# Patient Record
Sex: FEMALE | Race: WHITE | NOT HISPANIC OR LATINO | Employment: OTHER | URBAN - METROPOLITAN AREA
[De-identification: names, ages, dates, MRNs, and addresses within clinical notes are randomized per-mention and may not be internally consistent; named-entity substitution may affect disease eponyms.]

---

## 2023-04-20 LAB
ANTICARDIOLIPIN IGA ANTIBODY: 2.8 APL U/ML (ref 0–20)
ANTICARDIOLIPIN IGG ANTIBODY: <1.6 GPL U/ML (ref 0–20)
ANTICARDIOLIPIN IGM ANTIBODY: 10.4 MPL U/ML (ref 0–20)
BASOPHILS (10*3/UL) IN BLOOD BY AUTOMATED COUNT: 0.08 X10E9/L (ref 0–0.1)
BASOPHILS/100 LEUKOCYTES IN BLOOD BY AUTOMATED COUNT: 1.2 % (ref 0–2)
BETA 2 GLYCOPROTEIN 1 IGA AB IN SERUM: 2.9 U/ML (ref 0–20)
BETA 2 GLYCOPROTEIN 1 IGG AB IN SERUM: 1.6 U/ML (ref 0–20)
BETA 2 GLYCOPROTEIN 1 IGM ANTIBODY IN SERUM: 14.3 U/ML (ref 0–20)
DILUTE RUSSELL VIPER VENOM TIME CONF: 1.51 RATIO
DILUTE RUSSELL VIPER VENOM TIME SCREEN: 1.38 RATIO
DILUTE RUSSELL VIPER VENOM TIME TEST RATIO: 0.91 RATIO
EOSINOPHILS (10*3/UL) IN BLOOD BY AUTOMATED COUNT: 0.32 X10E9/L (ref 0–0.4)
EOSINOPHILS/100 LEUKOCYTES IN BLOOD BY AUTOMATED COUNT: 4.7 % (ref 0–6)
ERYTHROCYTE DISTRIBUTION WIDTH (RATIO) BY AUTOMATED COUNT: 13.1 % (ref 11.5–14.5)
ERYTHROCYTE MEAN CORPUSCULAR HEMOGLOBIN CONCENTRATION (G/DL) BY AUTOMATED: 32.4 G/DL (ref 32–36)
ERYTHROCYTE MEAN CORPUSCULAR VOLUME (FL) BY AUTOMATED COUNT: 102 FL (ref 80–100)
ERYTHROCYTES (10*6/UL) IN BLOOD BY AUTOMATED COUNT: 4.38 X10E12/L (ref 4–5.2)
HEMATOCRIT (%) IN BLOOD BY AUTOMATED COUNT: 44.7 % (ref 36–46)
HEMOGLOBIN (G/DL) IN BLOOD: 14.5 G/DL (ref 12–16)
IMMATURE GRANULOCYTES/100 LEUKOCYTES IN BLOOD BY AUTOMATED COUNT: 0.3 % (ref 0–0.9)
LEUKOCYTES (10*3/UL) IN BLOOD BY AUTOMATED COUNT: 6.8 X10E9/L (ref 4.4–11.3)
LYMPHOCYTES (10*3/UL) IN BLOOD BY AUTOMATED COUNT: 2.28 X10E9/L (ref 0.8–3)
LYMPHOCYTES/100 LEUKOCYTES IN BLOOD BY AUTOMATED COUNT: 33.6 % (ref 13–44)
MONOCYTES (10*3/UL) IN BLOOD BY AUTOMATED COUNT: 0.6 X10E9/L (ref 0.05–0.8)
MONOCYTES/100 LEUKOCYTES IN BLOOD BY AUTOMATED COUNT: 8.8 % (ref 2–10)
NEUTROPHILS (10*3/UL) IN BLOOD BY AUTOMATED COUNT: 3.48 X10E9/L (ref 1.6–5.5)
NEUTROPHILS/100 LEUKOCYTES IN BLOOD BY AUTOMATED COUNT: 51.4 % (ref 40–80)
NRBC (PER 100 WBCS) BY AUTOMATED COUNT: 0 /100 WBC (ref 0–0)
PLATELETS (10*3/UL) IN BLOOD AUTOMATED COUNT: 267 X10E9/L (ref 150–450)

## 2023-04-21 LAB — ANTI-NUCLEAR ANTIBODY (ANA): NEGATIVE

## 2023-04-25 LAB
ALBUMIN ELP: 3.8 G/DL (ref 3.4–5)
ALPHA 1: 0.2 G/DL (ref 0.2–0.6)
ALPHA 2: 1 G/DL (ref 0.4–1.1)
BETA: 0.9 G/DL (ref 0.5–1.2)
GAMMA GLOBULIN: 0.7 G/DL (ref 0.5–1.4)
PATH REVIEW-SERUM PROTEIN ELECTROPHORESIS: NORMAL
PROTEIN ELECTROPHORESIS INTERPRETATION: NORMAL
PROTEIN TOTAL: 6.7 G/DL (ref 6.4–8.2)

## 2023-04-26 LAB — CRYOFIBRINOGEN: NORMAL

## 2023-04-27 LAB
CRYOCRIT IMMUNODIFFUSION (SJC): NORMAL
CRYOCRIT IMMUNOFIXATION (SJC): NORMAL
CRYOGLOBULIN  (SJC): NEGATIVE
PERCENT CRYOCRIT (SJC): NORMAL %
RHEUMATOID FACTOR (SJC): NORMAL IU/ML

## 2023-08-07 ENCOUNTER — HOSPITAL ENCOUNTER (OUTPATIENT)
Dept: DATA CONVERSION | Facility: HOSPITAL | Age: 78
Discharge: HOME | End: 2023-08-07

## 2023-08-07 DIAGNOSIS — M54.50 LOW BACK PAIN, UNSPECIFIED: ICD-10-CM

## 2023-08-07 LAB
BASOPHILS # BLD AUTO: 0.06 K/UL (ref 0–0.22)
BASOPHILS NFR BLD AUTO: 0.8 % (ref 0–1)
DEPRECATED RDW RBC AUTO: 49.5 FL (ref 37–54)
DIFFERENTIAL METHOD BLD: ABNORMAL
EOSINOPHIL # BLD AUTO: 0.29 K/UL (ref 0–0.45)
EOSINOPHIL NFR BLD: 4 % (ref 0–3)
ERYTHROCYTE [DISTWIDTH] IN BLOOD BY AUTOMATED COUNT: 13.1 % (ref 11.7–15)
HCT VFR BLD AUTO: 43 % (ref 36–44)
HGB BLD-MCNC: 13.6 GM/DL (ref 12–15)
IMM GRANULOCYTES # BLD AUTO: 0.03 K/UL (ref 0–0.1)
LYMPHOCYTES # BLD AUTO: 2.35 K/UL (ref 1.2–3.2)
LYMPHOCYTES NFR BLD MANUAL: 32.3 % (ref 20–40)
MCH RBC QN AUTO: 32.3 PG (ref 26–34)
MCHC RBC AUTO-ENTMCNC: 31.6 % (ref 31–37)
MCV RBC AUTO: 102.1 FL (ref 80–100)
MONOCYTES # BLD AUTO: 0.74 K/UL (ref 0–0.8)
MONOCYTES NFR BLD MANUAL: 10.2 % (ref 0–8)
NEUTROPHILS # BLD AUTO: 3.81 K/UL
NEUTROPHILS # BLD AUTO: 3.81 K/UL (ref 1.8–7.7)
NEUTROPHILS.IMMATURE NFR BLD: 0.4 % (ref 0–1)
NEUTS SEG NFR BLD: 52.3 % (ref 50–70)
NRBC BLD-RTO: 0 /100 WBC
PLATELET # BLD AUTO: 232 K/UL (ref 150–450)
PMV BLD AUTO: 10.4 CU (ref 7–12.6)
RBC # BLD AUTO: 4.21 M/UL (ref 4–4.9)
WBC # BLD AUTO: 7.3 K/UL (ref 4.5–11)

## 2023-09-06 PROBLEM — R73.03 PREDIABETES: Status: ACTIVE | Noted: 2023-09-06

## 2023-09-06 PROBLEM — N81.6 RECTOCELE: Status: ACTIVE | Noted: 2023-09-06

## 2023-09-06 PROBLEM — I10 ESSENTIAL HYPERTENSION: Status: ACTIVE | Noted: 2023-09-06

## 2023-09-06 PROBLEM — M65.949 FLEXOR TENOSYNOVITIS OF FINGER: Status: ACTIVE | Noted: 2023-09-06

## 2023-09-06 PROBLEM — I47.19 ATRIAL PAROXYSMAL TACHYCARDIA (CMS-HCC): Status: ACTIVE | Noted: 2023-09-06

## 2023-09-06 PROBLEM — D75.89 MACROCYTOSIS WITHOUT ANEMIA: Status: ACTIVE | Noted: 2023-09-06

## 2023-09-06 PROBLEM — I48.91 ATRIAL FIBRILLATION (MULTI): Status: ACTIVE | Noted: 2023-09-06

## 2023-09-06 PROBLEM — Q45.3 ABNORMALITY OF PANCREATIC DUCT: Status: ACTIVE | Noted: 2023-09-06

## 2023-09-06 PROBLEM — R74.8 ELEVATED ALKALINE PHOSPHATASE LEVEL: Status: ACTIVE | Noted: 2023-09-06

## 2023-09-06 PROBLEM — M47.812 CERVICAL SPONDYLOSIS: Status: ACTIVE | Noted: 2023-09-06

## 2023-09-06 PROBLEM — E03.9 HYPOTHYROIDISM: Status: ACTIVE | Noted: 2023-09-06

## 2023-09-06 PROBLEM — I34.1 MITRAL VALVE PROLAPSE: Status: ACTIVE | Noted: 2023-09-06

## 2023-09-06 PROBLEM — K21.00 CHRONIC REFLUX ESOPHAGITIS: Status: ACTIVE | Noted: 2023-09-06

## 2023-09-06 PROBLEM — I48.0 PAROXYSMAL A-FIB (MULTI): Status: ACTIVE | Noted: 2023-09-06

## 2023-09-06 PROBLEM — I44.7 LEFT BUNDLE BRANCH BLOCK: Status: ACTIVE | Noted: 2023-09-06

## 2023-09-06 PROBLEM — E28.39 ESTROGEN DEFICIENCY: Status: ACTIVE | Noted: 2023-09-06

## 2023-09-06 PROBLEM — N81.6 FEMALE PROCTOCELE WITHOUT UTERINE PROLAPSE: Status: ACTIVE | Noted: 2023-09-06

## 2023-09-06 PROBLEM — I65.23 ATHEROSCLEROSIS OF BOTH CAROTID ARTERIES: Status: ACTIVE | Noted: 2023-09-06

## 2023-09-06 PROBLEM — I10 HYPERTENSIVE DISORDER: Status: ACTIVE | Noted: 2023-09-06

## 2023-09-06 PROBLEM — M47.812 CERVICAL SPINE ARTHRITIS: Status: ACTIVE | Noted: 2023-09-06

## 2023-09-06 PROBLEM — M15.9 DEGENERATIVE JOINT DISEASE INVOLVING MULTIPLE JOINTS: Status: ACTIVE | Noted: 2023-09-06

## 2023-09-06 PROBLEM — M65.9 FLEXOR TENOSYNOVITIS OF FINGER: Status: ACTIVE | Noted: 2023-09-06

## 2023-09-06 PROBLEM — N81.11 CYSTOCELE, MIDLINE: Status: ACTIVE | Noted: 2023-09-06

## 2023-09-06 PROBLEM — Z86.79 HISTORY OF ATRIAL FIBRILLATION: Status: ACTIVE | Noted: 2023-09-06

## 2023-09-06 PROBLEM — R55 NEAR SYNCOPE: Status: ACTIVE | Noted: 2023-09-06

## 2023-09-06 PROBLEM — M16.11 PRIMARY OSTEOARTHRITIS OF RIGHT HIP: Status: ACTIVE | Noted: 2023-09-06

## 2023-09-06 PROBLEM — R35.0 URINARY FREQUENCY: Status: ACTIVE | Noted: 2023-09-06

## 2023-09-06 PROBLEM — E78.5 HYPERLIPIDEMIA: Status: ACTIVE | Noted: 2023-09-06

## 2023-09-06 RX ORDER — OMEGA-3S/DHA/EPA/FISH OIL 300-1000MG
CAPSULE ORAL
COMMUNITY
End: 2023-11-01 | Stop reason: ALTCHOICE

## 2023-09-06 RX ORDER — BIOTIN 5 MG
TABLET ORAL
COMMUNITY
End: 2023-11-01 | Stop reason: ALTCHOICE

## 2023-09-06 RX ORDER — ASPIRIN 325 MG
TABLET ORAL
COMMUNITY
End: 2023-11-01 | Stop reason: ALTCHOICE

## 2023-09-06 RX ORDER — LEVOTHYROXINE SODIUM 25 UG/1
1 TABLET ORAL DAILY
COMMUNITY
End: 2023-11-01 | Stop reason: ALTCHOICE

## 2023-09-06 RX ORDER — LOSARTAN POTASSIUM 25 MG/1
25 TABLET ORAL DAILY
COMMUNITY
End: 2024-04-11

## 2023-09-06 RX ORDER — SOY ISOFLAV/BCOHOSH/CISSUS QUA 198 MG
1 CAPSULE ORAL DAILY
COMMUNITY
End: 2023-11-01 | Stop reason: ALTCHOICE

## 2023-09-06 RX ORDER — AMIODARONE HYDROCHLORIDE 100 MG/1
100 TABLET ORAL DAILY
COMMUNITY
Start: 2022-09-07 | End: 2024-03-16

## 2023-09-06 RX ORDER — ASPIRIN 81 MG/1
1 TABLET ORAL DAILY
COMMUNITY
End: 2023-11-01 | Stop reason: ALTCHOICE

## 2023-09-06 RX ORDER — VIT C/E/ZN/COPPR/LUTEIN/ZEAXAN 250MG-90MG
1 CAPSULE ORAL DAILY
COMMUNITY

## 2023-09-06 RX ORDER — LEVOTHYROXINE SODIUM 100 UG/1
100 TABLET ORAL
COMMUNITY
End: 2024-01-31 | Stop reason: DRUGHIGH

## 2023-09-06 RX ORDER — LEVOTHYROXINE SODIUM 50 UG/1
TABLET ORAL
COMMUNITY
End: 2023-11-01 | Stop reason: ALTCHOICE

## 2023-09-06 RX ORDER — METOPROLOL TARTRATE 25 MG/1
25 TABLET, FILM COATED ORAL 2 TIMES DAILY
COMMUNITY
End: 2023-11-01 | Stop reason: ALTCHOICE

## 2023-09-06 RX ORDER — MULTIVIT/FOLIC ACID/HERBAL 223 400 MCG
TABLET ORAL
COMMUNITY
End: 2023-11-01 | Stop reason: ALTCHOICE

## 2023-09-06 RX ORDER — DILTIAZEM HYDROCHLORIDE 300 MG/1
CAPSULE, COATED, EXTENDED RELEASE ORAL
COMMUNITY
End: 2023-11-01 | Stop reason: ALTCHOICE

## 2023-09-06 RX ORDER — DILTIAZEM HYDROCHLORIDE EXTENDED-RELEASE TABLETS 240 MG/1
1 TABLET, EXTENDED RELEASE ORAL DAILY
COMMUNITY
End: 2023-11-01 | Stop reason: ALTCHOICE

## 2023-09-06 RX ORDER — SIMVASTATIN 10 MG/1
TABLET, FILM COATED ORAL
COMMUNITY
End: 2023-11-01 | Stop reason: ALTCHOICE

## 2023-09-06 RX ORDER — ATORVASTATIN CALCIUM 40 MG/1
1 TABLET, FILM COATED ORAL DAILY
COMMUNITY
Start: 2022-10-05

## 2023-09-06 RX ORDER — FUROSEMIDE 20 MG/1
TABLET ORAL
COMMUNITY
End: 2023-11-01 | Stop reason: ALTCHOICE

## 2023-09-06 RX ORDER — FLUTICASONE PROPIONATE 50 MCG
2 SPRAY, SUSPENSION (ML) NASAL DAILY
COMMUNITY
Start: 2015-05-04 | End: 2023-11-01 | Stop reason: ALTCHOICE

## 2023-09-06 RX ORDER — LANOLIN ALCOHOL/MO/W.PET/CERES
1 CREAM (GRAM) TOPICAL DAILY
COMMUNITY

## 2023-09-06 RX ORDER — AZELASTINE HYDROCHLORIDE 0.5 MG/ML
1 SOLUTION/ DROPS OPHTHALMIC 2 TIMES DAILY
COMMUNITY
Start: 2015-05-04 | End: 2024-01-11

## 2023-09-06 RX ORDER — PANTOPRAZOLE SODIUM 40 MG/1
TABLET, DELAYED RELEASE ORAL
COMMUNITY
Start: 2022-06-16 | End: 2023-11-01 | Stop reason: ALTCHOICE

## 2023-11-01 ENCOUNTER — OFFICE VISIT (OUTPATIENT)
Dept: PRIMARY CARE | Facility: CLINIC | Age: 78
End: 2023-11-01
Payer: MEDICARE

## 2023-11-01 VITALS
SYSTOLIC BLOOD PRESSURE: 138 MMHG | DIASTOLIC BLOOD PRESSURE: 74 MMHG | TEMPERATURE: 97.7 F | WEIGHT: 138 LBS | BODY MASS INDEX: 22.27 KG/M2

## 2023-11-01 DIAGNOSIS — L23.7 ALLERGIC CONTACT DERMATITIS DUE TO PLANTS, EXCEPT FOOD: Primary | ICD-10-CM

## 2023-11-01 PROBLEM — R74.8 ELEVATED ALKALINE PHOSPHATASE LEVEL: Status: RESOLVED | Noted: 2023-09-06 | Resolved: 2023-11-01

## 2023-11-01 PROBLEM — R55 NEAR SYNCOPE: Status: RESOLVED | Noted: 2023-09-06 | Resolved: 2023-11-01

## 2023-11-01 PROBLEM — M65.9 FLEXOR TENOSYNOVITIS OF FINGER: Status: RESOLVED | Noted: 2023-09-06 | Resolved: 2023-11-01

## 2023-11-01 PROBLEM — R35.0 URINARY FREQUENCY: Status: RESOLVED | Noted: 2023-09-06 | Resolved: 2023-11-01

## 2023-11-01 PROBLEM — M65.949 FLEXOR TENOSYNOVITIS OF FINGER: Status: RESOLVED | Noted: 2023-09-06 | Resolved: 2023-11-01

## 2023-11-01 PROBLEM — M47.812 CERVICAL SPINE ARTHRITIS: Status: RESOLVED | Noted: 2023-09-06 | Resolved: 2023-11-01

## 2023-11-01 PROCEDURE — 3075F SYST BP GE 130 - 139MM HG: CPT | Performed by: INTERNAL MEDICINE

## 2023-11-01 PROCEDURE — 1126F AMNT PAIN NOTED NONE PRSNT: CPT | Performed by: INTERNAL MEDICINE

## 2023-11-01 PROCEDURE — 1160F RVW MEDS BY RX/DR IN RCRD: CPT | Performed by: INTERNAL MEDICINE

## 2023-11-01 PROCEDURE — 99213 OFFICE O/P EST LOW 20 MIN: CPT | Performed by: INTERNAL MEDICINE

## 2023-11-01 PROCEDURE — 1036F TOBACCO NON-USER: CPT | Performed by: INTERNAL MEDICINE

## 2023-11-01 PROCEDURE — 1159F MED LIST DOCD IN RCRD: CPT | Performed by: INTERNAL MEDICINE

## 2023-11-01 PROCEDURE — 3078F DIAST BP <80 MM HG: CPT | Performed by: INTERNAL MEDICINE

## 2023-11-01 RX ORDER — METHYLPREDNISOLONE 4 MG/1
TABLET ORAL
Qty: 21 TABLET | Refills: 0 | Status: SHIPPED | OUTPATIENT
Start: 2023-11-01 | End: 2023-11-08

## 2023-11-01 RX ORDER — IPRATROPIUM BROMIDE 42 UG/1
2 SPRAY, METERED NASAL 3 TIMES DAILY
COMMUNITY

## 2023-11-01 ASSESSMENT — PAIN SCALES - GENERAL: PAINLEVEL: 0-NO PAIN

## 2023-11-01 ASSESSMENT — PATIENT HEALTH QUESTIONNAIRE - PHQ9
SUM OF ALL RESPONSES TO PHQ9 QUESTIONS 1 AND 2: 0
1. LITTLE INTEREST OR PLEASURE IN DOING THINGS: NOT AT ALL
2. FEELING DOWN, DEPRESSED OR HOPELESS: NOT AT ALL

## 2023-11-01 ASSESSMENT — ENCOUNTER SYMPTOMS
DEPRESSION: 0
LOSS OF SENSATION IN FEET: 0
RESPIRATORY NEGATIVE: 1
CARDIOVASCULAR NEGATIVE: 1
OCCASIONAL FEELINGS OF UNSTEADINESS: 0

## 2023-11-01 NOTE — PATIENT INSTRUCTIONS
Nicolette sent a Medrol Dosepak into your pharmacy which should really help take away the poison ivy.  If you will call our office with the cream I will be happy to refill that your pharmacy as well and you can use it on your hand but not on your face.  You might not even needed for this particular incident but because you have had recurrent issues with this might be good to have this cream available to you.

## 2023-11-01 NOTE — PROGRESS NOTES
[facility] [place]: [facility]  PROGRESS NOTE      Nicolette Hatch is a 78 y.o. female that is presenting today for Establish Care.    Assessment/Plan   Diagnoses and all orders for this visit:  Allergic contact dermatitis due to plants, except food  Because this is on her face we will go ahead and give her some steroids.  I think she will recover nicely.  Subjective   Nicolette sees me today because she thinks she has poison ivy.  She had infected working in the yard and has had a problem with this in the past.  Over the last week she has developed some rash on her hand and her face that she would like me to look at.  She had some old decrepit and steroid cream that she ran out of and thought she may be needed something new.    Review of Systems   Respiratory: Negative.     Cardiovascular: Negative.       Objective   Vitals:    11/01/23 0830   BP: 138/74   Temp: 36.5 °C (97.7 °F)      Body mass index is 22.27 kg/m².  Physical Exam  Skin:     Comments: Patient has a few bubbles and papules in a linear configuration present especially on the left hand and on the left side of the forehead.  This is really not extensive but does not fact include the face as well.     Diagnostic Results   Lab Results   Component Value Date    GLUCOSE 91 07/24/2023    CALCIUM 8.9 07/24/2023     07/24/2023    K 4.4 07/24/2023    CO2 24 07/24/2023     (H) 07/24/2023    BUN 19 07/24/2023    CREATININE 0.9 07/24/2023     Lab Results   Component Value Date    ALT 23 07/24/2023    AST 22 07/24/2023    GGT 22 09/27/2022    ALKPHOS 124 07/24/2023    BILITOT 0.4 07/24/2023     Lab Results   Component Value Date    WBC 7.3 08/07/2023    HGB 13.6 08/07/2023    HCT 43.0 08/07/2023    .1 (H) 08/07/2023     08/07/2023     Lab Results   Component Value Date    CHOL 140 07/24/2023    CHOL 165 01/10/2023    CHOL 200 09/27/2022     Lab Results   Component Value Date    HDL 48 (L) 07/24/2023    HDL 56 01/10/2023    HDL  "51 09/27/2022     Lab Results   Component Value Date    LDLCALC 74 07/24/2023    LDLCALC 86 01/10/2023    LDLCALC 120 09/27/2022     Lab Results   Component Value Date    TRIG 91 07/24/2023    TRIG 113 01/10/2023    TRIG 143 09/27/2022     No components found for: \"CHOLHDL\"  Lab Results   Component Value Date    HGBA1C 5.7 07/24/2023     Other labs not included in the list above were reviewed either before or during this encounter.    History    History reviewed. No pertinent past medical history.  Past Surgical History:   Procedure Laterality Date   • TOTAL KNEE ARTHROPLASTY Left     August 9, 2023     Family History   Problem Relation Name Age of Onset   • Colon cancer Mother     • COPD Father     • Lung disease Father     • Other (Calcified heart valves) Sister     • Colon cancer Brother       Social History     Socioeconomic History   • Marital status:      Spouse name: Not on file   • Number of children: Not on file   • Years of education: Not on file   • Highest education level: Not on file   Occupational History   • Not on file   Tobacco Use   • Smoking status: Never   • Smokeless tobacco: Never   Substance and Sexual Activity   • Alcohol use: Never   • Drug use: Never   • Sexual activity: Not on file   Other Topics Concern   • Not on file   Social History Narrative   • Not on file     Social Determinants of Health     Financial Resource Strain: Not on file   Food Insecurity: Not on file   Transportation Needs: Not on file   Physical Activity: Not on file   Stress: Not on file   Social Connections: Not on file   Intimate Partner Violence: Not on file   Housing Stability: Not on file     Allergies   Allergen Reactions   • Codeine Other     Nausea  Flu-like sx  oversedation   • Indomethacin Swelling   • Nitrofurantoin Monohyd/M-Cryst Rash     Current Outpatient Medications on File Prior to Visit   Medication Sig Dispense Refill   • amiodarone (Pacerone) 100 mg tablet Take 1 tablet (100 mg) by mouth once " daily.     • apixaban (Eliquis) 2.5 mg tablet Take 1 tablet (2.5 mg) by mouth 2 times a day.     • atorvastatin (Lipitor) 40 mg tablet Take 1 tablet (40 mg) by mouth once daily.     • azelastine (Optivar) 0.05 % ophthalmic solution Administer 1 drop into both eyes 2 times a day.     • cholecalciferol (Vitamin D-3) 25 MCG (1000 UT) capsule Take 1 capsule (25 mcg) by mouth once daily.     • cyanocobalamin (Vitamin B-12) 1,000 mcg tablet Take 1 tablet (1,000 mcg) by mouth once daily.     • fexofenadine ODT (Allegra ODT) 30 mg disintegrating tablet Take 1 tablet (30 mg) by mouth once daily.     • ipratropium (Atrovent) 42 mcg (0.06 %) nasal spray Administer 2 sprays into each nostril 3 times a day.     • levothyroxine (Synthroid, Levoxyl) 100 mcg tablet Take 1 tablet (100 mcg) by mouth once daily in the morning. Take before meals.     • losartan (Cozaar) 25 mg tablet Take 1 tablet (25 mg) by mouth once daily.     • MAGNESIUM ORAL Magnesium 500 MG  1 tablet with a meal Orally Once a day     • [DISCONTINUED] ascorbic acid/bioflavonoids (BREEZY-C PO) Take by mouth.     • [DISCONTINUED] aspirin 325 mg tablet Take by mouth.     • [DISCONTINUED] aspirin 81 mg EC tablet Take 1 tablet (81 mg) by mouth once daily.     • [DISCONTINUED] biotin 5 mg tablet Take by mouth.     • [DISCONTINUED] biotin/folic ac/vit Bcomp,C/Zn (BIOTIN FORTE ORAL) 3 mg     • [DISCONTINUED] CRANBERRY ORAL Take by mouth.     • [DISCONTINUED] dilTIAZem CD (Cartia XT) 300 mg 24 hr capsule Take by mouth.     • [DISCONTINUED] dilTIAZem LA (Cardizem LA) 240 mg 24 hr tablet Take 1 tablet (240 mg) by mouth once daily.     • [DISCONTINUED] fluticasone (Flonase) 50 mcg/actuation nasal spray Administer 2 sprays into each nostril once daily.     • [DISCONTINUED] furosemide (Lasix) 20 mg tablet 1 tablet Orally Once a day in summer time     • [DISCONTINUED] Lactobacillus acidophilus (ACIDOPHILUS ORAL) Take by mouth.     • [DISCONTINUED] levothyroxine (LevoxyL) 50 mcg  tablet Take by mouth.     • [DISCONTINUED] levothyroxine (Synthroid, Levoxyl) 25 mcg tablet Take 1 tablet (25 mcg) by mouth once daily.     • [DISCONTINUED] magnesium carbonate-asafetida 250 mg/5 mL suspension Take by mouth.     • [DISCONTINUED] metoprolol tartrate (Lopressor) 25 mg tablet Take 1 tablet (25 mg) by mouth 2 times a day.     • [DISCONTINUED] mv,Ca,min-FA-herbal comp #223 (Estroven Mood and Memory) 400 mcg tablet Take by mouth.     • [DISCONTINUED] omega-3s-dha-epa-fish oil (Omega-3 Fish OiL) 300-1,000 mg capsule Take by mouth.     • [DISCONTINUED] pantoprazole (ProtoNix) 40 mg EC tablet Take 1 tablet once daily 30 minutes prior to breakfast.     • [DISCONTINUED] rhubarb root extract (Estroven Cmplt Menopause Rlf) 4 mg tablet Take 1 tablet by mouth once daily.     • [DISCONTINUED] simvastatin (Zocor) 10 mg tablet Take by mouth.       No current facility-administered medications on file prior to visit.     Immunization History   Administered Date(s) Administered   • Flu vaccine, quadrivalent, high-dose, preservative free, age 65y+ (FLUZONE) 10/08/2020, 09/23/2021, 10/05/2022   • Influenza, High Dose Seasonal, Preservative Free 11/03/2015, 10/17/2016, 09/27/2017, 10/22/2018, 10/07/2019   • Influenza, seasonal, injectable 10/18/2010, 10/10/2011, 10/29/2012, 09/24/2013, 10/07/2014   • Moderna COVID-19 vaccine, bivalent, blue cap/gray label *Check age/dose* 12/09/2022, 05/07/2023   • Moderna SARS-CoV-2 Vaccination 03/01/2021, 03/29/2021, 11/11/2021   • Pneumococcal conjugate vaccine, 13-valent (PREVNAR 13) 07/13/2015   • Pneumococcal conjugate vaccine, 20-valent (PREVNAR 20) 10/05/2022   • Pneumococcal polysaccharide vaccine, 23-valent, age 2 years and older (PNEUMOVAX 23) 07/29/2008, 09/24/2013   • Td vaccine, age 7 years and older (TDVAX) 07/29/2005   • Tdap vaccine, age 7 year and older (BOOSTRIX) 01/18/2016   • Zoster vaccine, recombinant, adult (SHINGRIX) 02/07/2019, 08/28/2020   • Zoster, live  02/29/2008     Patient's medical history was reviewed and updated either before or during this encounter.    Oliver Mcgraw MD

## 2024-01-11 ENCOUNTER — OFFICE VISIT (OUTPATIENT)
Dept: CARDIOLOGY | Facility: CLINIC | Age: 79
End: 2024-01-11
Payer: MEDICARE

## 2024-01-11 VITALS
DIASTOLIC BLOOD PRESSURE: 79 MMHG | WEIGHT: 137 LBS | OXYGEN SATURATION: 98 % | BODY MASS INDEX: 22.11 KG/M2 | SYSTOLIC BLOOD PRESSURE: 126 MMHG | HEART RATE: 78 BPM

## 2024-01-11 DIAGNOSIS — I10 PRIMARY HYPERTENSION: ICD-10-CM

## 2024-01-11 DIAGNOSIS — I48.0 PAROXYSMAL A-FIB (MULTI): Primary | ICD-10-CM

## 2024-01-11 PROCEDURE — 3078F DIAST BP <80 MM HG: CPT | Performed by: INTERNAL MEDICINE

## 2024-01-11 PROCEDURE — 3074F SYST BP LT 130 MM HG: CPT | Performed by: INTERNAL MEDICINE

## 2024-01-11 PROCEDURE — 99213 OFFICE O/P EST LOW 20 MIN: CPT | Performed by: INTERNAL MEDICINE

## 2024-01-11 PROCEDURE — 1036F TOBACCO NON-USER: CPT | Performed by: INTERNAL MEDICINE

## 2024-01-11 PROCEDURE — 1159F MED LIST DOCD IN RCRD: CPT | Performed by: INTERNAL MEDICINE

## 2024-01-11 PROCEDURE — 1126F AMNT PAIN NOTED NONE PRSNT: CPT | Performed by: INTERNAL MEDICINE

## 2024-01-11 PROCEDURE — 1160F RVW MEDS BY RX/DR IN RCRD: CPT | Performed by: INTERNAL MEDICINE

## 2024-01-11 RX ORDER — MINERAL OIL
1 ENEMA (ML) RECTAL AS NEEDED
COMMUNITY

## 2024-01-11 ASSESSMENT — ENCOUNTER SYMPTOMS
EYES NEGATIVE: 1
BLOATING: 0
CONSTITUTIONAL NEGATIVE: 1
NEUROLOGICAL NEGATIVE: 1
DEPRESSION: 0
GASTROINTESTINAL NEGATIVE: 1
CHILLS: 0
OCCASIONAL FEELINGS OF UNSTEADINESS: 0
LOSS OF SENSATION IN FEET: 0
ENDOCRINE NEGATIVE: 1
RESPIRATORY NEGATIVE: 1
COUGH: 0
FEVER: 0

## 2024-01-11 ASSESSMENT — PAIN SCALES - GENERAL: PAINLEVEL: 0-NO PAIN

## 2024-01-11 NOTE — PROGRESS NOTES
Subjective      Chief Complaint   Patient presents with    6 MONTH FOLLOW UP     PAROXYSMAL A FIB          She has a history of atrial fibrillation and is taking Eliquis.  She was seen last July and was clear at that time for total knee replacement. The knee is doing well  She is active. She is not complaining of chest discomfort.  No complaints of PND or orthopnea.  The legs are not swelling on her.  NO palpitaions.  She has not felt the afib and no dizziness or lightheadedness.           Review of Systems   Constitutional: Negative. Negative for chills and fever.   HENT: Negative.  Negative for congestion.    Eyes: Negative.    Respiratory: Negative.  Negative for cough.    Endocrine: Negative.    Skin: Negative.    Musculoskeletal:  Positive for joint pain.   Gastrointestinal: Negative.  Negative for bloating.   Genitourinary: Negative.    Neurological: Negative.    All other systems reviewed and are negative.       Past Surgical History:   Procedure Laterality Date    CATARACT EXTRACTION Bilateral 2015    COLONOSCOPY  2008    COLONOSCOPY  2013    COLONOSCOPY  03/17/2018    COLPORRHAPHY  2021    A+P    ESOPHAGOGASTRODUODENOSCOPY  2008    FOOT SURGERY  2007    FOOT SURGERY Left 2019    [facility]    HAND SURGERY Left 2005    carpometacarpo arthroplasty    LUMBAR SPINE SURGERY  1994    TOTAL ABDOMINAL HYSTERECTOMY W/ BILATERAL SALPINGOOPHORECTOMY  1980    TOTAL KNEE ARTHROPLASTY Left     August 9, 2023    VARICOSE VEIN SURGERY  2016    Dr. Mohseni        Active Ambulatory Problems     Diagnosis Date Noted    Abnormality of pancreatic duct 09/06/2023    Atherosclerosis of both carotid arteries 09/06/2023    Atrial fibrillation (CMS/HCC) 09/06/2023    Paroxysmal A-fib (CMS/HCC) 09/06/2023    Atrial paroxysmal tachycardia 09/06/2023    Cervical spondylosis 09/06/2023    Chronic reflux esophagitis 09/06/2023    Cystocele, midline 09/06/2023    Degenerative joint disease involving multiple joints 09/06/2023    Hypertensive  disorder 09/06/2023    Essential hypertension 09/06/2023    Prediabetes 09/06/2023    History of atrial fibrillation 09/06/2023    Hyperlipidemia 09/06/2023    Hypothyroidism 09/06/2023    Left bundle branch block 09/06/2023    Macrocytosis without anemia 09/06/2023    Mitral valve prolapse 09/06/2023    Primary osteoarthritis of right hip 09/06/2023    Rectocele 09/06/2023    Female proctocele without uterine prolapse 09/06/2023     Resolved Ambulatory Problems     Diagnosis Date Noted    Urinary frequency 09/06/2023    Cervical spine arthritis 09/06/2023    Elevated alkaline phosphatase level 09/06/2023    Near syncope 09/06/2023    Flexor tenosynovitis of finger 09/06/2023     No Additional Past Medical History        Visit Vitals  /79   Pulse 78   Wt 62.1 kg (137 lb)   SpO2 98%   BMI 22.11 kg/m²   Smoking Status Never   BSA 1.7 m²        Objective     Constitutional:       Appearance: Healthy appearance.   Eyes:      Pupils: Pupils are equal, round, and reactive to light.   Neck:      Vascular: JVD normal.   Pulmonary:      Breath sounds: Normal breath sounds.   Cardiovascular:      PMI at left midclavicular line. Normal rate. Irregular rhythm. Normal S1. Normal S2.       Murmurs: There is no murmur.      No gallop.  No click. No rub.   Pulses:     Intact distal pulses.   Edema:     Peripheral edema absent.   Abdominal:      Palpations: Abdomen is soft.      Tenderness: There is no abdominal tenderness.   Musculoskeletal:      Extremities: No clubbing present.Skin:     General: Skin is warm and dry.   Neurological:      General: No focal deficit present.            Lab Review:         Lab Results   Component Value Date    CHOL 140 07/24/2023    CHOL 165 01/10/2023    CHOL 200 09/27/2022     Lab Results   Component Value Date    HDL 48 (L) 07/24/2023    HDL 56 01/10/2023    HDL 51 09/27/2022     Lab Results   Component Value Date    LDLCALC 74 07/24/2023    LDLCALC 86 01/10/2023    LDLCALC 120 09/27/2022  "    Lab Results   Component Value Date    TRIG 91 07/24/2023    TRIG 113 01/10/2023    TRIG 143 09/27/2022     No components found for: \"CHOLHDL\"     Assessment/Plan     Paroxysmal A-fib (CMS/HCC)  She ios doing well and the afib seems to be rare.  The thyroid and the cxr where checked last time and is doing well.    Hypertensive disorder  Is doing well.     "

## 2024-01-11 NOTE — ASSESSMENT & PLAN NOTE
She ios doing well and the afib seems to be rare.  The thyroid and the cxr where checked last time and is doing well.

## 2024-01-19 ENCOUNTER — HOSPITAL ENCOUNTER (EMERGENCY)
Facility: HOSPITAL | Age: 79
Discharge: HOME | End: 2024-01-19
Payer: MEDICARE

## 2024-01-19 ENCOUNTER — TELEPHONE (OUTPATIENT)
Dept: CARDIOLOGY | Facility: CLINIC | Age: 79
End: 2024-01-19
Payer: MEDICARE

## 2024-01-19 ENCOUNTER — APPOINTMENT (OUTPATIENT)
Dept: RADIOLOGY | Facility: HOSPITAL | Age: 79
End: 2024-01-19
Payer: MEDICARE

## 2024-01-19 ENCOUNTER — APPOINTMENT (OUTPATIENT)
Dept: CARDIOLOGY | Facility: HOSPITAL | Age: 79
End: 2024-01-19
Payer: MEDICARE

## 2024-01-19 VITALS
HEART RATE: 96 BPM | WEIGHT: 130 LBS | TEMPERATURE: 100.7 F | OXYGEN SATURATION: 92 % | SYSTOLIC BLOOD PRESSURE: 103 MMHG | BODY MASS INDEX: 20.4 KG/M2 | HEIGHT: 67 IN | RESPIRATION RATE: 18 BRPM | DIASTOLIC BLOOD PRESSURE: 50 MMHG

## 2024-01-19 DIAGNOSIS — R50.9 FEVER, UNSPECIFIED FEVER CAUSE: ICD-10-CM

## 2024-01-19 DIAGNOSIS — N39.0 URINARY TRACT INFECTION WITHOUT HEMATURIA, SITE UNSPECIFIED: Primary | ICD-10-CM

## 2024-01-19 DIAGNOSIS — J18.9 PNEUMONIA DUE TO INFECTIOUS ORGANISM, UNSPECIFIED LATERALITY, UNSPECIFIED PART OF LUNG: ICD-10-CM

## 2024-01-19 LAB
ANION GAP SERPL CALC-SCNC: 14 MMOL/L
APPEARANCE UR: ABNORMAL
BACTERIA #/AREA URNS AUTO: ABNORMAL /HPF
BASOPHILS # BLD AUTO: 0.04 X10*3/UL (ref 0–0.1)
BASOPHILS NFR BLD AUTO: 0.3 %
BILIRUB UR STRIP.AUTO-MCNC: NEGATIVE MG/DL
BUN SERPL-MCNC: 29 MG/DL (ref 8–25)
CALCIUM SERPL-MCNC: 8.9 MG/DL (ref 8.5–10.4)
CHLORIDE SERPL-SCNC: 104 MMOL/L (ref 97–107)
CO2 SERPL-SCNC: 20 MMOL/L (ref 24–31)
COLOR UR: ABNORMAL
CREAT SERPL-MCNC: 1 MG/DL (ref 0.4–1.6)
EGFRCR SERPLBLD CKD-EPI 2021: 57 ML/MIN/1.73M*2
EOSINOPHIL # BLD AUTO: 0.01 X10*3/UL (ref 0–0.4)
EOSINOPHIL NFR BLD AUTO: 0.1 %
ERYTHROCYTE [DISTWIDTH] IN BLOOD BY AUTOMATED COUNT: 13.5 % (ref 11.5–14.5)
FLUAV RNA RESP QL NAA+PROBE: NOT DETECTED
FLUBV RNA RESP QL NAA+PROBE: NOT DETECTED
GLUCOSE SERPL-MCNC: 152 MG/DL (ref 65–99)
GLUCOSE UR STRIP.AUTO-MCNC: NORMAL MG/DL
HCT VFR BLD AUTO: 44 % (ref 36–46)
HGB BLD-MCNC: 14.2 G/DL (ref 12–16)
HYALINE CASTS #/AREA URNS AUTO: ABNORMAL /LPF
IMM GRANULOCYTES # BLD AUTO: 0.06 X10*3/UL (ref 0–0.5)
IMM GRANULOCYTES NFR BLD AUTO: 0.5 % (ref 0–0.9)
KETONES UR STRIP.AUTO-MCNC: ABNORMAL MG/DL
LEUKOCYTE ESTERASE UR QL STRIP.AUTO: ABNORMAL
LYMPHOCYTES # BLD AUTO: 0.62 X10*3/UL (ref 0.8–3)
LYMPHOCYTES NFR BLD AUTO: 4.9 %
MCH RBC QN AUTO: 32.3 PG (ref 26–34)
MCHC RBC AUTO-ENTMCNC: 32.3 G/DL (ref 32–36)
MCV RBC AUTO: 100 FL (ref 80–100)
MONOCYTES # BLD AUTO: 0.26 X10*3/UL (ref 0.05–0.8)
MONOCYTES NFR BLD AUTO: 2.1 %
MUCOUS THREADS #/AREA URNS AUTO: ABNORMAL /LPF
NEUTROPHILS # BLD AUTO: 11.64 X10*3/UL (ref 1.6–5.5)
NEUTROPHILS NFR BLD AUTO: 92.1 %
NITRITE UR QL STRIP.AUTO: ABNORMAL
NRBC BLD-RTO: 0 /100 WBCS (ref 0–0)
NT-PROBNP SERPL-MCNC: 717 PG/ML (ref 0–624)
PH UR STRIP.AUTO: 5.5 [PH]
PLATELET # BLD AUTO: 183 X10*3/UL (ref 150–450)
POTASSIUM SERPL-SCNC: 4.5 MMOL/L (ref 3.4–5.1)
PROT UR STRIP.AUTO-MCNC: ABNORMAL MG/DL
RBC # BLD AUTO: 4.39 X10*6/UL (ref 4–5.2)
RBC # UR STRIP.AUTO: ABNORMAL /UL
RBC #/AREA URNS AUTO: ABNORMAL /HPF
RSV RNA RESP QL NAA+PROBE: NOT DETECTED
SARS-COV-2 RNA RESP QL NAA+PROBE: NOT DETECTED
SODIUM SERPL-SCNC: 138 MMOL/L (ref 133–145)
SP GR UR STRIP.AUTO: 1.02
SQUAMOUS #/AREA URNS AUTO: ABNORMAL /HPF
UROBILINOGEN UR STRIP.AUTO-MCNC: NORMAL MG/DL
WBC # BLD AUTO: 12.6 X10*3/UL (ref 4.4–11.3)
WBC #/AREA URNS AUTO: >50 /HPF
WBC CLUMPS #/AREA URNS AUTO: ABNORMAL /HPF

## 2024-01-19 PROCEDURE — 93005 ELECTROCARDIOGRAM TRACING: CPT

## 2024-01-19 PROCEDURE — 85025 COMPLETE CBC W/AUTO DIFF WBC: CPT | Performed by: PHYSICIAN ASSISTANT

## 2024-01-19 PROCEDURE — 2500000004 HC RX 250 GENERAL PHARMACY W/ HCPCS (ALT 636 FOR OP/ED): Performed by: PHYSICIAN ASSISTANT

## 2024-01-19 PROCEDURE — 96375 TX/PRO/DX INJ NEW DRUG ADDON: CPT

## 2024-01-19 PROCEDURE — 99284 EMERGENCY DEPT VISIT MOD MDM: CPT | Mod: 25

## 2024-01-19 PROCEDURE — 36415 COLL VENOUS BLD VENIPUNCTURE: CPT | Performed by: PHYSICIAN ASSISTANT

## 2024-01-19 PROCEDURE — 71046 X-RAY EXAM CHEST 2 VIEWS: CPT

## 2024-01-19 PROCEDURE — 96365 THER/PROPH/DIAG IV INF INIT: CPT

## 2024-01-19 PROCEDURE — 83880 ASSAY OF NATRIURETIC PEPTIDE: CPT | Performed by: PHYSICIAN ASSISTANT

## 2024-01-19 PROCEDURE — 87637 SARSCOV2&INF A&B&RSV AMP PRB: CPT | Performed by: PHYSICIAN ASSISTANT

## 2024-01-19 PROCEDURE — 2500000001 HC RX 250 WO HCPCS SELF ADMINISTERED DRUGS (ALT 637 FOR MEDICARE OP): Performed by: PHYSICIAN ASSISTANT

## 2024-01-19 PROCEDURE — 84132 ASSAY OF SERUM POTASSIUM: CPT | Performed by: PHYSICIAN ASSISTANT

## 2024-01-19 PROCEDURE — 81001 URINALYSIS AUTO W/SCOPE: CPT | Performed by: PHYSICIAN ASSISTANT

## 2024-01-19 RX ORDER — IBUPROFEN 400 MG/1
400 TABLET ORAL ONCE
Status: DISCONTINUED | OUTPATIENT
Start: 2024-01-19 | End: 2024-01-19 | Stop reason: HOSPADM

## 2024-01-19 RX ORDER — ACETAMINOPHEN 325 MG/1
975 TABLET ORAL ONCE
Status: COMPLETED | OUTPATIENT
Start: 2024-01-19 | End: 2024-01-19

## 2024-01-19 RX ORDER — LEVOFLOXACIN 750 MG/1
750 TABLET ORAL ONCE
Status: COMPLETED | OUTPATIENT
Start: 2024-01-19 | End: 2024-01-19

## 2024-01-19 RX ORDER — ONDANSETRON HYDROCHLORIDE 2 MG/ML
4 INJECTION, SOLUTION INTRAVENOUS ONCE
Status: COMPLETED | OUTPATIENT
Start: 2024-01-19 | End: 2024-01-19

## 2024-01-19 RX ORDER — LEVOFLOXACIN 750 MG/1
750 TABLET ORAL DAILY
Qty: 5 TABLET | Refills: 0 | Status: SHIPPED | OUTPATIENT
Start: 2024-01-19 | End: 2024-01-22 | Stop reason: WASHOUT

## 2024-01-19 RX ORDER — CEFTRIAXONE 1 G/50ML
1 INJECTION, SOLUTION INTRAVENOUS ONCE
Status: COMPLETED | OUTPATIENT
Start: 2024-01-19 | End: 2024-01-19

## 2024-01-19 RX ORDER — ACETAMINOPHEN 325 MG/1
1000 TABLET ORAL EVERY 6 HOURS PRN
Qty: 30 TABLET | Refills: 0 | Status: SHIPPED | OUTPATIENT
Start: 2024-01-19 | End: 2024-01-29

## 2024-01-19 RX ADMIN — ACETAMINOPHEN 975 MG: 325 TABLET ORAL at 18:18

## 2024-01-19 RX ADMIN — LEVOFLOXACIN 750 MG: 750 TABLET, FILM COATED ORAL at 20:18

## 2024-01-19 RX ADMIN — SODIUM CHLORIDE 500 ML: 900 INJECTION, SOLUTION INTRAVENOUS at 18:19

## 2024-01-19 RX ADMIN — CEFTRIAXONE SODIUM 1 G: 1 INJECTION, SOLUTION INTRAVENOUS at 19:27

## 2024-01-19 RX ADMIN — ONDANSETRON 4 MG: 2 INJECTION INTRAMUSCULAR; INTRAVENOUS at 18:16

## 2024-01-19 ASSESSMENT — PAIN SCALES - GENERAL
PAINLEVEL_OUTOF10: 10 - WORST POSSIBLE PAIN
PAINLEVEL_OUTOF10: 8
PAINLEVEL_OUTOF10: 0 - NO PAIN

## 2024-01-19 ASSESSMENT — PAIN DESCRIPTION - LOCATION
LOCATION: BACK
LOCATION: BACK

## 2024-01-19 ASSESSMENT — PAIN DESCRIPTION - PAIN TYPE: TYPE: CHRONIC PAIN

## 2024-01-19 ASSESSMENT — LIFESTYLE VARIABLES
REASON UNABLE TO ASSESS: NO
HAVE PEOPLE ANNOYED YOU BY CRITICIZING YOUR DRINKING: NO
HAVE YOU EVER FELT YOU SHOULD CUT DOWN ON YOUR DRINKING: NO
EVER HAD A DRINK FIRST THING IN THE MORNING TO STEADY YOUR NERVES TO GET RID OF A HANGOVER: NO
EVER FELT BAD OR GUILTY ABOUT YOUR DRINKING: NO

## 2024-01-19 ASSESSMENT — COLUMBIA-SUICIDE SEVERITY RATING SCALE - C-SSRS
2. HAVE YOU ACTUALLY HAD ANY THOUGHTS OF KILLING YOURSELF?: NO
1. IN THE PAST MONTH, HAVE YOU WISHED YOU WERE DEAD OR WISHED YOU COULD GO TO SLEEP AND NOT WAKE UP?: NO
6. HAVE YOU EVER DONE ANYTHING, STARTED TO DO ANYTHING, OR PREPARED TO DO ANYTHING TO END YOUR LIFE?: NO

## 2024-01-19 ASSESSMENT — PAIN - FUNCTIONAL ASSESSMENT
PAIN_FUNCTIONAL_ASSESSMENT: 0-10
PAIN_FUNCTIONAL_ASSESSMENT: 0-10

## 2024-01-19 ASSESSMENT — PAIN DESCRIPTION - ORIENTATION: ORIENTATION: LOWER

## 2024-01-19 NOTE — ED PROVIDER NOTES
HPI   Chief Complaint   Patient presents with    Tremors     Pt to ED via EMS for tremors. Started Wednesday night with headache, again yesterday waking her from sleep, and again today while working on computer. Chronic back pain 10/10 at baseline; has not seen PCP for this. SOB with ambulation. No chest pain, ear/sinus/throat pain. Pt with dry cough. No N/V/D, no urinary symptoms.        78-year-old female past medical history of atrial fibrillation on Eliquis presenting to emergency department with a chief complaint of 1 day of bodyaches, chills.  States she feels she cannot get warm.  Patient has a fever on arrival.  She complains of a nonproductive dry cough.  She denies chest discomfort.  She denies shortness of breath.  She denies sick contact.  She denies lightheadedness dizziness numbness weakness.  Well-appearing nontoxic.  No other complaint.                          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    Atherosclerosis of both carotid arteries 09/06/2023    Atrial fibrillation (CMS/HCC) 09/06/2023    Cervical spine arthritis 09/06/2023    Cervical spondylosis 09/06/2023    Chronic reflux esophagitis 09/06/2023    Degenerative joint disease involving multiple joints 09/06/2023    Essential hypertension 09/06/2023    Flexor tenosynovitis of finger 09/06/2023    Hyperlipidemia 09/06/2023    Hypothyroidism 09/06/2023    Left bundle branch block 09/06/2023    Macrocytosis without anemia 09/06/2023    Mitral valve prolapse 09/06/2023    Prediabetes 09/06/2023    Primary osteoarthritis of right hip 09/06/2023     Past Surgical History:   Procedure Laterality Date    CATARACT EXTRACTION Bilateral 2015    COLONOSCOPY  2008    COLONOSCOPY  2013    COLONOSCOPY  03/17/2018    COLPORRHAPHY  2021    A+P    ESOPHAGOGASTRODUODENOSCOPY  2008    FOOT SURGERY  2007    FOOT SURGERY Left 2019    [facility]    HAND  SURGERY Left 2005    carpometacarpo arthroplasty    LUMBAR SPINE SURGERY  1994    TOTAL ABDOMINAL HYSTERECTOMY W/ BILATERAL SALPINGOOPHORECTOMY  1980    TOTAL KNEE ARTHROPLASTY Left     August 9, 2023    VARICOSE VEIN SURGERY  2016    Dr. Mohseni     Family History   Problem Relation Name Age of Onset    Colon cancer Mother      COPD Father Champ     Lung disease Father Champ     Other (Calcified heart valves) Sister      Colon cancer Brother       Social History     Tobacco Use    Smoking status: Never    Smokeless tobacco: Never   Substance Use Topics    Alcohol use: Never    Drug use: Never       Physical Exam   ED Triage Vitals [01/19/24 1705]   Temp Heart Rate Respirations BP   38.2 °C (100.7 °F) (!) 104 18 165/84      Pulse Ox Temp Source Heart Rate Source Patient Position   96 % Oral Monitor Sitting      BP Location FiO2 (%)     Left arm --       Physical Exam  Vitals and nursing note reviewed.   Constitutional:       Appearance: Normal appearance.   HENT:      Head: Normocephalic and atraumatic.      Nose: Nose normal.      Mouth/Throat:      Mouth: Mucous membranes are moist.   Cardiovascular:      Rate and Rhythm: Normal rate and regular rhythm.   Pulmonary:      Effort: Pulmonary effort is normal.      Breath sounds: Normal breath sounds.      Comments: Slightly diminished at right lung base with wheeze  Abdominal:      General: Abdomen is flat.      Palpations: Abdomen is soft.   Musculoskeletal:         General: Normal range of motion.      Cervical back: Normal range of motion.   Skin:     General: Skin is warm.   Neurological:      General: No focal deficit present.      Mental Status: She is oriented to person, place, and time.   Psychiatric:         Mood and Affect: Mood normal.         ED Course & MDM   ED Course as of 01/19/24 2007 Fri Jan 19, 2024   1730 EKG: Sinus tachycardia, left bundle branch block, nonspecific ST abnormalities.  No STEMI.  Interpreted by me. [FR]      ED Course User  Index  [FR] Luis A Driver DO         Diagnoses as of 01/19/24 2007   Urinary tract infection without hematuria, site unspecified   Pneumonia due to infectious organism, unspecified laterality, unspecified part of lung   Fever, unspecified fever cause       Medical Decision Making  I have seen and evaluated the patient.  Consultation with attending physician was obtained.  They are in agreement with plan of care and disposition.  I have seen and evaluated this patient.  Physician available for consultation.  Vital signs have been reviewed.  All laboratory and diagnostic imaging is reviewed by myself and interpreted by myself unless otherwise stated.  Additionally imaging is interpreted by radiologist.    Patient with minimal leukocytosis no significant anemia metabolic panel without keerthi renal impairment or electrolyte abnormality.  Viral testing is negative.  Chest x-ray with some blunting of right costophrenic angle.  Urinalysis is infected.  proBNP is in the 700 range.  Patient has no complaints of shortness of breath.  Her echocardiogram from 2021 is reviewed and her EF is greater than 60.  Was concerned that her finding on chest x-ray may actually represent a pneumonia.  She is given dose of broad-spectrum antibiotic.  She is ambulatory without difficulty.  Her pulse ox was documented at 92 on the chart but on my evaluation is between 95 and 97 every time I evaluate patient.  Will place on coverage for urine and pneumonia.  Return if worsening or change in symptoms.  Released in good condition.  Discussed at length with her and her daughter who are comfortable and agreeable to this plan.    Labs Reviewed  CBC WITH AUTO DIFFERENTIAL - Abnormal     WBC                           12.6 (*)               nRBC                          0.0                    RBC                           4.39                   Hemoglobin                    14.2                   Hematocrit                    44.0                    MCV                           100                    MCH                           32.3                   MCHC                          32.3                   RDW                           13.5                   Platelets                     183                    Neutrophils %                 92.1                   Immature Granulocytes %, Automated   0.5                    Lymphocytes %                 4.9                    Monocytes %                   2.1                    Eosinophils %                 0.1                    Basophils %                   0.3                    Neutrophils Absolute          11.64 (*)               Immature Granulocytes Absolute, Au*   0.06                   Lymphocytes Absolute          0.62 (*)               Monocytes Absolute            0.26                   Eosinophils Absolute          0.01                   Basophils Absolute            0.04                BASIC METABOLIC PANEL - Abnormal     Glucose                       152 (*)                Sodium                        138                    Potassium                     4.5                    Chloride                      104                    Bicarbonate                   20 (*)                 Urea Nitrogen                 29 (*)                 Creatinine                    1.00                   eGFR                          57 (*)                 Calcium                       8.9                    Anion Gap                     14                  URINALYSIS WITH REFLEX MICROSCOPIC - Abnormal     Color, Urine                                         Appearance, Urine             Turbid (*)               Specific Gravity, Urine       1.020                  pH, Urine                     5.5                    Protein, Urine                50 (1+) (*)               Glucose, Urine                Normal                 Blood, Urine                  0.5 (2+) (*)               Ketones, Urine                TRACE (*)                Bilirubin, Urine              NEGATIVE                Urobilinogen, Urine           Normal                 Nitrite, Urine                2+ (*)                 Leukocyte Esterase, Urine     500 Sheryl/µL (*)            MICROSCOPIC ONLY, URINE - Abnormal     WBC, Urine                    >50 (*)                WBC Clumps, Urine             OCCASIONAL                RBC, Urine                    11-20 (*)               Squamous Epithelial Cells, Urine                          Bacteria, Urine               1+ (*)                 Mucus, Urine                  FEW                    Hyaline Casts, Urine          1+ (*)              N-TERMINAL PROBNP - Abnormal     PROBNP                        717 (*)                  Narrative: Reference ranges are based on clinical submission data. These ranges represent the 95th percentile of normal cut-off points. As NT Pro- BNP values approach 1000 pg/ml, clinical symptoms are more likely associated with CHF.  SARS-COV-2 AND INFLUENZA A/B PCR - Normal     Flu A Result                                         Flu B Result                                         Coronavirus 2019, PCR                                  Narrative: This assay has received FDA Emergency Use Authorization (EUA) and  is only authorized for the duration of time that circumstances exist to justify the authorization of the emergency use of in vitro diagnostic tests for the detection of SARS-CoV-2 virus and/or diagnosis of COVID-19 infection under section 564(b)(1) of the Act, 21 U.S.C. 360bbb-3(b)(1). Testing for SARS-CoV-2 is only recommended for patients who meet current clinical and/or epidemiological criteria as defined by federal, state, or local public health directives. This assay is an in vitro diagnostic nucleic acid amplification test for the qualitative detection of SARS-CoV-2, Influenza A, and Influenza B from nasopharyngeal specimens and has been validated for use at [facility]. Negative results do not preclude COVID-19 infections or Influenza A/B infections, and should not be used as the sole basis for diagnosis, treatment, or other management decisions. If Influenza A/B and RSV PCR results are negative, testing for Parainfluenza virus, Adenovirus and Metapneumovirus is routinely performed for [facility] pediatric oncology and intensive care inpatients, and is available on other patients by placing an add-on request.   RSV PCR - Normal  XR chest 2 views   Final Result    Probable pulmonary vascular congestion, new compared to 07/29/2023.          MACRO:    none          Signed by: Leonides Polo 1/19/2024 5:29 PM    Dictation workstation:   [id number]     Medications  ibuprofen tablet 400 mg (400 mg oral Not Given 1/19/24 1705)  sodium chloride 0.9 % bolus 500 mL (0 mL intravenous Stopped 1/19/24 1849)  acetaminophen (Tylenol) tablet 975 mg (975 mg oral Given 1/19/24 1818)  ondansetron (Zofran) injection 4 mg (4 mg intravenous Given 1/19/24 1816)  cefTRIAXone (Rocephin) IVPB 1 g (0 g intravenous Stopped 1/19/24 1957)  levoFLOXacin (Levaquin) tablet 750 mg (750 mg oral Given 1/19/24 2018)  Discharge Medication List as of 1/19/2024  8:06 PM    START taking these medications    acetaminophen (Tylenol) 325 mg tablet  Take 3 tablets (975 mg) by mouth every 6 hours if needed for mild pain (1 - 3) for up to 10 days., Starting Fri 1/19/2024, Until Mon 1/29/2024 at 2359, Normal    levoFLOXacin (Levaquin) 750 mg tablet  Take 1 tablet (750 mg) by mouth once daily for 5 days., Starting Fri 1/19/2024, Until Wed 1/24/2024, Normal                  Procedure  Procedures     Joseph Romano PA-C  01/19/24 5634

## 2024-01-19 NOTE — TELEPHONE ENCOUNTER
Patient called, said since office visit 01/11/2024 she started the amiodarone and she's felt fine. Then about 3 days ago, she started feeling shaky and checked her cardia/EKG isreal and it showed she was in afib. Since then, the isreal has shown she is not in afib and she is feeling better today. She would like to know, if she experiences an episode like that again, is she allowed to take an extra dose of her amiodarone? Please advise.

## 2024-01-21 DIAGNOSIS — I48.91 UNSPECIFIED ATRIAL FIBRILLATION (MULTI): ICD-10-CM

## 2024-01-22 ENCOUNTER — OFFICE VISIT (OUTPATIENT)
Dept: PRIMARY CARE | Facility: CLINIC | Age: 79
End: 2024-01-22
Payer: MEDICARE

## 2024-01-22 VITALS
BODY MASS INDEX: 21.46 KG/M2 | TEMPERATURE: 97.8 F | SYSTOLIC BLOOD PRESSURE: 120 MMHG | OXYGEN SATURATION: 95 % | WEIGHT: 137 LBS | HEART RATE: 67 BPM | DIASTOLIC BLOOD PRESSURE: 70 MMHG

## 2024-01-22 DIAGNOSIS — R09.89 PULMONARY VASCULAR CONGESTION: ICD-10-CM

## 2024-01-22 DIAGNOSIS — N30.01 ACUTE CYSTITIS WITH HEMATURIA: Primary | ICD-10-CM

## 2024-01-22 PROBLEM — M79.89 OTHER SPECIFIED SOFT TISSUE DISORDERS: Status: ACTIVE | Noted: 2023-01-30
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
## 2024-01-22 PROBLEM — K86.9 DISORDER OF PANCREATIC DUCT (HHS-HCC): Status: ACTIVE | Noted: 2022-12-06

## 2024-01-22 PROBLEM — R10.9 UNSPECIFIED ABDOMINAL PAIN: Status: RESOLVED | Noted: 2022-09-06 | Resolved: 2024-01-22

## 2024-01-22 PROBLEM — I48.0 PAROXYSMAL ATRIAL FIBRILLATION (MULTI): Status: ACTIVE | Noted: 2022-09-06

## 2024-01-22 PROBLEM — M54.50 LOW BACK PAIN, UNSPECIFIED: Status: ACTIVE | Noted: 2023-08-07

## 2024-01-22 PROBLEM — J18.9 PNEUMONIA DUE TO INFECTIOUS ORGANISM: Status: ACTIVE | Noted: 2024-01-22

## 2024-01-22 PROBLEM — R23.0 CYANOSIS: Status: ACTIVE | Noted: 2023-05-25

## 2024-01-22 PROBLEM — M46.1 SACROILIITIS (CMS-HCC): Status: ACTIVE | Noted: 2022-09-12

## 2024-01-22 PROBLEM — D75.89 OTHER SPECIFIED DISEASES OF BLOOD AND BLOOD-FORMING ORGANS: Status: ACTIVE | Noted: 2022-09-27

## 2024-01-22 PROBLEM — R50.9 FEVER: Status: RESOLVED | Noted: 2024-01-22 | Resolved: 2024-01-22

## 2024-01-22 PROBLEM — J31.0 RHINITIS: Status: ACTIVE | Noted: 2024-01-22

## 2024-01-22 PROBLEM — L23.7 ALLERGIC CONTACT DERMATITIS DUE TO PLANT: Status: ACTIVE | Noted: 2023-01-30

## 2024-01-22 PROBLEM — M65.141: Status: ACTIVE | Noted: 2023-01-30

## 2024-01-22 PROBLEM — M62.81 MUSCLE WEAKNESS (GENERALIZED): Status: ACTIVE | Noted: 2022-09-06

## 2024-01-22 PROBLEM — R11.0 NAUSEA: Status: RESOLVED | Noted: 2022-09-06 | Resolved: 2024-01-22

## 2024-01-22 PROBLEM — R74.8 ABNORMAL LEVELS OF OTHER SERUM ENZYMES: Status: ACTIVE | Noted: 2022-09-27

## 2024-01-22 PROBLEM — Z79.899 OTHER LONG TERM (CURRENT) DRUG THERAPY: Status: ACTIVE | Noted: 2022-09-06

## 2024-01-22 PROBLEM — M13.0 POLYARTHRITIS, UNSPECIFIED: Status: ACTIVE | Noted: 2023-04-06

## 2024-01-22 PROBLEM — M47.816 SPONDYLOSIS OF LUMBAR REGION WITHOUT MYELOPATHY OR RADICULOPATHY: Status: ACTIVE | Noted: 2022-06-13

## 2024-01-22 PROBLEM — Z20.822 CONTACT WITH AND (SUSPECTED) EXPOSURE TO COVID-19: Status: RESOLVED | Noted: 2022-09-06 | Resolved: 2024-01-22

## 2024-01-22 PROBLEM — E28.39 DECREASED ESTROGEN LEVEL: Status: ACTIVE | Noted: 2022-11-08

## 2024-01-22 PROBLEM — M10.041 IDIOPATHIC GOUT, RIGHT HAND: Status: ACTIVE | Noted: 2023-01-30

## 2024-01-22 PROBLEM — J30.1 ALLERGIC RHINITIS DUE TO POLLEN: Status: RESOLVED | Noted: 2024-01-22 | Resolved: 2024-01-22

## 2024-01-22 PROBLEM — M15.9 GENERALIZED OSTEOARTHRITIS: Status: ACTIVE | Noted: 2023-01-30

## 2024-01-22 PROBLEM — H53.9 UNSPECIFIED VISUAL DISTURBANCE: Status: ACTIVE | Noted: 2022-12-13

## 2024-01-22 PROBLEM — L03.90 CELLULITIS: Status: RESOLVED | Noted: 2023-01-30 | Resolved: 2024-01-22

## 2024-01-22 PROBLEM — J30.81 ALLERGIC RHINITIS DUE TO ANIMAL HAIR AND DANDER: Status: RESOLVED | Noted: 2024-01-22 | Resolved: 2024-01-22

## 2024-01-22 PROBLEM — M51.16 DISPLACEMENT OF LUMBAR INTERVERTEBRAL DISC WITH RADICULOPATHY: Status: ACTIVE | Noted: 2022-07-29

## 2024-01-22 PROBLEM — M25.559 PAIN IN UNSPECIFIED HIP: Status: RESOLVED | Noted: 2023-01-18 | Resolved: 2024-01-22

## 2024-01-22 PROBLEM — M51.17 INTERVERTEBRAL DISC DISORDER WITH RADICULOPATHY OF LUMBOSACRAL REGION: Status: ACTIVE | Noted: 2022-07-29

## 2024-01-22 PROBLEM — N81.10 FEMALE CYSTOCELE: Status: RESOLVED | Noted: 2023-09-06 | Resolved: 2024-01-22

## 2024-01-22 PROBLEM — R42 DIZZINESS AND GIDDINESS: Status: RESOLVED | Noted: 2022-12-13 | Resolved: 2024-01-22

## 2024-01-22 PROBLEM — Z79.890 HORMONE REPLACEMENT THERAPY: Status: ACTIVE | Noted: 2023-01-30

## 2024-01-22 PROBLEM — M54.2 NECK PAIN: Status: RESOLVED | Noted: 2024-01-22 | Resolved: 2024-01-22

## 2024-01-22 PROBLEM — R53.83 OTHER FATIGUE: Status: ACTIVE | Noted: 2022-09-06

## 2024-01-22 PROBLEM — M06.4 INFLAMMATORY POLYARTHROPATHY (MULTI): Status: ACTIVE | Noted: 2023-01-30

## 2024-01-22 PROBLEM — R60.9 EDEMA: Status: ACTIVE | Noted: 2023-05-25

## 2024-01-22 PROBLEM — I73.89 ACROCYANOSIS (CMS-HCC): Status: ACTIVE | Noted: 2024-01-22

## 2024-01-22 PROBLEM — I73.89 OTHER SPECIFIED PERIPHERAL VASCULAR DISEASES (CMS-HCC): Status: ACTIVE | Noted: 2023-05-25

## 2024-01-22 PROBLEM — I73.00 RAYNAUD'S SYNDROME WITHOUT GANGRENE: Status: ACTIVE | Noted: 2023-06-22

## 2024-01-22 PROBLEM — H10.10 ALLERGIC CONJUNCTIVITIS: Status: RESOLVED | Noted: 2024-01-22 | Resolved: 2024-01-22

## 2024-01-22 PROBLEM — R60.0 EDEMA OF UPPER EXTREMITY: Status: ACTIVE | Noted: 2024-01-22

## 2024-01-22 PROBLEM — L29.9 PRURITIC DISORDER: Status: ACTIVE | Noted: 2024-01-22

## 2024-01-22 PROBLEM — N39.0 LOWER URINARY TRACT INFECTIOUS DISEASE: Status: RESOLVED | Noted: 2024-01-22 | Resolved: 2024-01-22

## 2024-01-22 PROBLEM — M47.817 SPONDYLOSIS OF LUMBOSACRAL JOINT WITHOUT MYELOPATHY: Status: ACTIVE | Noted: 2022-06-13

## 2024-01-22 PROBLEM — M77.11 LATERAL EPICONDYLITIS OF RIGHT ELBOW: Status: ACTIVE | Noted: 2018-05-11

## 2024-01-22 PROCEDURE — 99214 OFFICE O/P EST MOD 30 MIN: CPT | Performed by: LICENSED PRACTICAL NURSE

## 2024-01-22 PROCEDURE — 3074F SYST BP LT 130 MM HG: CPT | Performed by: LICENSED PRACTICAL NURSE

## 2024-01-22 PROCEDURE — 1036F TOBACCO NON-USER: CPT | Performed by: LICENSED PRACTICAL NURSE

## 2024-01-22 PROCEDURE — 3078F DIAST BP <80 MM HG: CPT | Performed by: LICENSED PRACTICAL NURSE

## 2024-01-22 PROCEDURE — 1160F RVW MEDS BY RX/DR IN RCRD: CPT | Performed by: LICENSED PRACTICAL NURSE

## 2024-01-22 PROCEDURE — 1159F MED LIST DOCD IN RCRD: CPT | Performed by: LICENSED PRACTICAL NURSE

## 2024-01-22 PROCEDURE — 1125F AMNT PAIN NOTED PAIN PRSNT: CPT | Performed by: LICENSED PRACTICAL NURSE

## 2024-01-22 RX ORDER — APIXABAN 5 MG/1
5 TABLET, FILM COATED ORAL 2 TIMES DAILY
Qty: 60 TABLET | Refills: 6 | Status: SHIPPED | OUTPATIENT
Start: 2024-01-22 | End: 2024-01-22 | Stop reason: WASHOUT

## 2024-01-22 RX ORDER — FUROSEMIDE 40 MG/1
40 TABLET ORAL DAILY
Qty: 3 TABLET | Refills: 11 | Status: SHIPPED | OUTPATIENT
Start: 2024-01-22

## 2024-01-22 RX ORDER — DOXYCYCLINE 100 MG/1
100 CAPSULE ORAL 2 TIMES DAILY
Qty: 14 CAPSULE | Refills: 0 | Status: SHIPPED | OUTPATIENT
Start: 2024-01-22 | End: 2024-01-29

## 2024-01-22 ASSESSMENT — ENCOUNTER SYMPTOMS
DEPRESSION: 0
CHILLS: 0
FEVER: 0
COUGH: 1
DIFFICULTY URINATING: 0
DYSURIA: 0
SHORTNESS OF BREATH: 1
OCCASIONAL FEELINGS OF UNSTEADINESS: 1
LOSS OF SENSATION IN FEET: 0
DIAPHORESIS: 0

## 2024-01-22 ASSESSMENT — PATIENT HEALTH QUESTIONNAIRE - PHQ9
1. LITTLE INTEREST OR PLEASURE IN DOING THINGS: NOT AT ALL
2. FEELING DOWN, DEPRESSED OR HOPELESS: NOT AT ALL
SUM OF ALL RESPONSES TO PHQ9 QUESTIONS 1 AND 2: 0

## 2024-01-22 ASSESSMENT — PAIN SCALES - GENERAL: PAINLEVEL: 3

## 2024-01-22 NOTE — TELEPHONE ENCOUNTER
Called patient, advised per Dr. Golden she is not to take an extra dose of amiodarone. Patient stated that over the weekend she went to the ED, found out she was not in afib but had as uti and pneumonia.

## 2024-01-22 NOTE — PATIENT INSTRUCTIONS
It was nice meeting you today Mrs. Hatch. I'm sorry you experienced those symptoms a few days ago. I'll switch you to a different antibiotic that may be a little safer. I will also prescribe you a few water pills for your swelling and this may help with your shortness of breath.    Make sure you take all the pills. We will see you back for your follow up in a few days.

## 2024-01-22 NOTE — PROGRESS NOTES
[facility]: [facility]  PROGRESS NOTE      Nicolette Hatch is a 78 y.o. female that is presenting today for No chief complaint on file..      Subjective   Pt presents to the office today for ED follow up . Mrs. Hatch was seen in the ED 24 for concerns of a fever and shaking. She was initially found to have an SP02 of 92% with a recheck at 95-97%. Upon further exam she was noted also to have a UTI with probable pulmonary vascular congestion on CXR and a proBNP of 700. She was diagnosed with PN and UTI and treated with levofloxacin. Today she notes that she stopped her ATB after 2 doses as she has concerns regarding tendon rupture. She notes initially experiencing pain to her left ankle that has subsided after taking the medicine. She reports mild SOB, while walking up stairs, but reiterates that her symptoms are mild. She also notes having a slight cough. Finally she report also mild bilateral lower leg edema that she notes occurs intermittently since the summer. She denies all urinary symptoms.       Review of Systems   Constitutional:  Negative for chills, diaphoresis and fever.   Respiratory:  Positive for cough and shortness of breath.    Cardiovascular:  Positive for leg swelling. Negative for chest pain.   Genitourinary:  Negative for difficulty urinating and dysuria.      Objective   Vitals:    24 1505   BP: 120/70   Pulse: 67   Temp: 36.6 °C (97.8 °F)   SpO2: 95%      Body mass index is 21.46 kg/m².  Physical Exam  Constitutional:       General: She is not in acute distress.     Appearance: She is not ill-appearing or toxic-appearing.   Cardiovascular:      Rate and Rhythm: Normal rate and regular rhythm.      Heart sounds: Normal heart sounds. No murmur heard.     No S3 sounds.   Pulmonary:      Effort: Pulmonary effort is normal. No respiratory distress.      Comments: Noted right lower fine crackles cleared with a cough  Musculoskeletal:      Right lower le+ Pitting  "Edema present.      Left lower le+ Pitting Edema present.       Diagnostic Results   Lab Results   Component Value Date    GLUCOSE 152 (H) 2024    CALCIUM 8.9 2024     2024    K 4.5 2024    CO2 20 (L) 2024     2024    BUN 29 (H) 2024    CREATININE 1.00 2024     Lab Results   Component Value Date    ALT 23 2023    AST 22 2023    GGT 22 2022    ALKPHOS 124 2023    BILITOT 0.4 2023     Lab Results   Component Value Date    WBC 12.6 (H) 2024    HGB 14.2 2024    HCT 44.0 2024     2024     2024     Lab Results   Component Value Date    CHOL 140 2023    CHOL 165 01/10/2023    CHOL 200 2022     Lab Results   Component Value Date    HDL 48 (L) 2023    HDL 56 01/10/2023    HDL 51 2022     Lab Results   Component Value Date    LDLCALC 74 2023    LDLCALC 86 01/10/2023    LDLCALC 120 2022     Lab Results   Component Value Date    TRIG 91 2023    TRIG 113 01/10/2023    TRIG 143 2022     No components found for: \"CHOLHDL\"  Lab Results   Component Value Date    HGBA1C 5.7 2023     Other labs not included in the list above were reviewed either before or during this encounter.    History    Past Medical History:   Diagnosis Date    Abnormality of pancreatic duct 2023    Allergic conjunctivitis 2024    Atherosclerosis of both carotid arteries 2023    Atrial fibrillation (CMS/HCC) 2023    Cellulitis 2023    Cervical spine arthritis 2023    Cervical spondylosis 2023    Chronic reflux esophagitis 2023    Contact with and (suspected) exposure to covid-19 2022    Degenerative joint disease involving multiple joints 2023    Dizziness and giddiness 2022    Epiphora due to insufficient drainage of both sides 2016    Essential hypertension 2023    Female cystocele 2023    " Flexor tenosynovitis of finger 09/06/2023    Hyperlipidemia 09/06/2023    Hypothyroidism 09/06/2023    Left bundle branch block 09/06/2023    Lower urinary tract infectious disease 01/22/2024    Macrocytosis without anemia 09/06/2023    Mitral valve prolapse 09/06/2023    Prediabetes 09/06/2023    Primary osteoarthritis of right hip 09/06/2023    Unspecified abdominal pain 09/06/2022     Past Surgical History:   Procedure Laterality Date    CATARACT EXTRACTION Bilateral 2015    COLONOSCOPY  2008    COLONOSCOPY  2013    COLONOSCOPY  03/17/2018    COLPORRHAPHY  2021    A+P    ESOPHAGOGASTRODUODENOSCOPY  2008    FOOT SURGERY  2007    FOOT SURGERY Left 2019    [facility]    HAND SURGERY Left 2005    carpometacarpo arthroplasty    LUMBAR SPINE SURGERY  1994    TOTAL ABDOMINAL HYSTERECTOMY W/ BILATERAL SALPINGOOPHORECTOMY  1980    TOTAL KNEE ARTHROPLASTY Left     August 9, 2023    VARICOSE VEIN SURGERY  2016    Dr. Mohseni     Family History   Problem Relation Name Age of Onset    Colon cancer Mother      COPD Father Champ     Lung disease Father Champ     Other (Calcified heart valves) Sister      Colon cancer Brother       Social History     Socioeconomic History    Marital status:      Spouse name: Not on file    Number of children: Not on file    Years of education: Not on file    Highest education level: Not on file   Occupational History    Not on file   Tobacco Use    Smoking status: Never    Smokeless tobacco: Never   Vaping Use    Vaping Use: Never used   Substance and Sexual Activity    Alcohol use: Never    Drug use: Never    Sexual activity: Never   Other Topics Concern    Not on file   Social History Narrative    Not on file     Social Determinants of Health     Financial Resource Strain: Not on file   Food Insecurity: Not on file   Transportation Needs: Not on file   Physical Activity: Not on file   Stress: Not on file   Social Connections: Not on file   Intimate Partner Violence: Not on file   Housing  Stability: Not on file     Allergies   Allergen Reactions    Codeine Other, GI Upset, Nausea/vomiting and Drowsiness     Nausea    Flu-like sx    oversedation    Indomethacin Swelling    Nitrofurantoin Rash    Nitrofurantoin Monohyd/M-Cryst Rash     Current Outpatient Medications on File Prior to Visit   Medication Sig Dispense Refill    acetaminophen (Tylenol) 325 mg tablet Take 3 tablets (975 mg) by mouth every 6 hours if needed for mild pain (1 - 3) for up to 10 days. 30 tablet 0    amiodarone (Pacerone) 100 mg tablet Take 1 tablet (100 mg) by mouth once daily.      apixaban (Eliquis) 2.5 mg tablet Take 1 tablet (2.5 mg) by mouth 2 times a day.      atorvastatin (Lipitor) 40 mg tablet Take 1 tablet (40 mg) by mouth once daily.      cholecalciferol (Vitamin D-3) 25 MCG (1000 UT) capsule Take 1 capsule (25 mcg) by mouth once daily.      cyanocobalamin (Vitamin B-12) 1,000 mcg tablet Take 1 tablet (1,000 mcg) by mouth once daily.      fexofenadine (Allegra) 180 mg tablet Take 1 tablet (180 mg) by mouth once daily.      ipratropium (Atrovent) 42 mcg (0.06 %) nasal spray Administer 2 sprays into each nostril 3 times a day.      levothyroxine (Synthroid, Levoxyl) 100 mcg tablet Take 1 tablet (100 mcg) by mouth once daily in the morning. Take before meals.      losartan (Cozaar) 25 mg tablet Take 1 tablet (25 mg) by mouth once daily.      MAGNESIUM ORAL Magnesium 500 MG  1 tablet with a meal Orally Once a day      [DISCONTINUED] Eliquis 5 mg tablet TAKE 1 TABLET BY MOUTH TWICE DAILY AS DIRECTED. (Patient not taking: Reported on 1/22/2024) 60 tablet 6    [DISCONTINUED] levoFLOXacin (Levaquin) 750 mg tablet Take 1 tablet (750 mg) by mouth once daily for 5 days. (Patient not taking: Reported on 1/22/2024) 5 tablet 0     No current facility-administered medications on file prior to visit.     Immunization History   Administered Date(s) Administered    Flu vaccine, quadrivalent, high-dose, preservative free, age 65y+  (FLUZONE) 10/08/2020, 09/23/2021, 10/05/2022, 09/20/2023    Influenza, High Dose Seasonal, Preservative Free 11/03/2015, 10/17/2016, 09/27/2017, 10/22/2018, 10/07/2019    Influenza, seasonal, injectable 10/18/2010, 10/10/2011, 10/29/2012, 09/24/2013, 10/07/2014    Moderna COVID-19 vaccine, Fall 2023, 12 yeasrs and older (50mcg/0.5mL) 12/05/2023    Moderna COVID-19 vaccine, bivalent, blue cap/gray label *Check age/dose* 12/09/2022, 05/07/2023    Moderna SARS-CoV-2 Vaccination 03/01/2021, 03/29/2021, 11/11/2021    Pneumococcal conjugate vaccine, 13-valent (PREVNAR 13) 07/13/2015    Pneumococcal conjugate vaccine, 20-valent (PREVNAR 20) 10/05/2022    Pneumococcal polysaccharide vaccine, 23-valent, age 2 years and older (PNEUMOVAX 23) 07/29/2008, 09/24/2013    Td vaccine, age 7 years and older (TDVAX) 07/29/2005    Tdap vaccine, age 7 year and older (BOOSTRIX) 01/18/2016    Zoster vaccine, recombinant, adult (SHINGRIX) 02/07/2019, 08/28/2020    Zoster, live 02/29/2008     Patient's medical history was reviewed and updated either before or during this encounter.       Assessment/Plan   Problem List Items Addressed This Visit    None  Visit Diagnoses       Acute cystitis with hematuria    -  Primary    Relevant Medications    doxycycline (Vibramycin) 100 mg capsule    Pulmonary vascular congestion        Relevant Medications    furosemide (Lasix) 40 mg tablet        I will discontinue Mrs. Hatch's levofloaxacin as she has concerns regarding the multiple side effects including tendon rupture. As the ED had concerns for PN and she has a positive UTI I will prescribe her with doxycycline 100mg BID x 7 days. I will also write an order for furosemide 40mg daily x 3 days as she notes mild SOB with bilateral lower leg edema and CXR probably for vascular congestion. Mrs. Hatch will follow up with her PCP, Dr. Mcgraw 1/31/24    TREMAINE Falk-CNP

## 2024-01-24 ENCOUNTER — LAB (OUTPATIENT)
Dept: LAB | Facility: LAB | Age: 79
End: 2024-01-24
Payer: MEDICARE

## 2024-01-24 DIAGNOSIS — I10 ESSENTIAL (PRIMARY) HYPERTENSION: Primary | ICD-10-CM

## 2024-01-24 DIAGNOSIS — R73.03 PREDIABETES: ICD-10-CM

## 2024-01-24 DIAGNOSIS — E03.9 HYPOTHYROIDISM, UNSPECIFIED: ICD-10-CM

## 2024-01-24 DIAGNOSIS — E78.5 HYPERLIPIDEMIA, UNSPECIFIED: ICD-10-CM

## 2024-01-24 LAB
ALBUMIN SERPL-MCNC: 3.6 G/DL (ref 3.5–5)
ALP BLD-CCNC: 117 U/L (ref 35–125)
ALT SERPL-CCNC: 23 U/L (ref 5–40)
ANION GAP SERPL CALC-SCNC: 11 MMOL/L
AST SERPL-CCNC: 23 U/L (ref 5–40)
ATRIAL RATE: 113 BPM
BILIRUB SERPL-MCNC: 0.4 MG/DL (ref 0.1–1.2)
BUN SERPL-MCNC: 24 MG/DL (ref 8–25)
CALCIUM SERPL-MCNC: 8.9 MG/DL (ref 8.5–10.4)
CHLORIDE SERPL-SCNC: 105 MMOL/L (ref 97–107)
CHOLEST SERPL-MCNC: 130 MG/DL (ref 133–200)
CHOLEST/HDLC SERPL: 3.6 {RATIO}
CO2 SERPL-SCNC: 27 MMOL/L (ref 24–31)
CREAT SERPL-MCNC: 1.1 MG/DL (ref 0.4–1.6)
EGFRCR SERPLBLD CKD-EPI 2021: 51 ML/MIN/1.73M*2
ERYTHROCYTE [DISTWIDTH] IN BLOOD BY AUTOMATED COUNT: 13.2 % (ref 11.5–14.5)
EST. AVERAGE GLUCOSE BLD GHB EST-MCNC: 114 MG/DL
GLUCOSE SERPL-MCNC: 98 MG/DL (ref 65–99)
HBA1C MFR BLD: 5.6 %
HCT VFR BLD AUTO: 42.9 % (ref 36–46)
HDLC SERPL-MCNC: 36 MG/DL
HGB BLD-MCNC: 13.5 G/DL (ref 12–16)
LDLC SERPL CALC-MCNC: 69 MG/DL (ref 65–130)
MCH RBC QN AUTO: 31.6 PG (ref 26–34)
MCHC RBC AUTO-ENTMCNC: 31.5 G/DL (ref 32–36)
MCV RBC AUTO: 101 FL (ref 80–100)
NRBC BLD-RTO: 0 /100 WBCS (ref 0–0)
P AXIS: 62 DEGREES
P OFFSET: 195 MS
P ONSET: 135 MS
PLATELET # BLD AUTO: 245 X10*3/UL (ref 150–450)
POTASSIUM SERPL-SCNC: 4.4 MMOL/L (ref 3.4–5.1)
PR INTERVAL: 156 MS
PROT SERPL-MCNC: 6.2 G/DL (ref 5.9–7.9)
Q ONSET: 213 MS
QRS COUNT: 19 BEATS
QRS DURATION: 130 MS
QT INTERVAL: 354 MS
QTC CALCULATION(BAZETT): 485 MS
QTC FREDERICIA: 437 MS
R AXIS: -48 DEGREES
RBC # BLD AUTO: 4.27 X10*6/UL (ref 4–5.2)
SODIUM SERPL-SCNC: 143 MMOL/L (ref 133–145)
T AXIS: 106 DEGREES
T OFFSET: 390 MS
T4 FREE SERPL-MCNC: 1.7 NG/DL (ref 0.9–1.7)
TRIGL SERPL-MCNC: 126 MG/DL (ref 40–150)
TSH SERPL DL<=0.05 MIU/L-ACNC: 5.67 MIU/L (ref 0.27–4.2)
VENTRICULAR RATE: 113 BPM
WBC # BLD AUTO: 7.6 X10*3/UL (ref 4.4–11.3)

## 2024-01-24 PROCEDURE — 85027 COMPLETE CBC AUTOMATED: CPT

## 2024-01-24 PROCEDURE — 80053 COMPREHEN METABOLIC PANEL: CPT

## 2024-01-24 PROCEDURE — 83036 HEMOGLOBIN GLYCOSYLATED A1C: CPT

## 2024-01-24 PROCEDURE — 80061 LIPID PANEL: CPT

## 2024-01-24 PROCEDURE — 84443 ASSAY THYROID STIM HORMONE: CPT

## 2024-01-24 PROCEDURE — 36415 COLL VENOUS BLD VENIPUNCTURE: CPT

## 2024-01-24 PROCEDURE — 84439 ASSAY OF FREE THYROXINE: CPT

## 2024-01-29 ASSESSMENT — PROMIS GLOBAL HEALTH SCALE
CARRYOUT_SOCIAL_ACTIVITIES: VERY GOOD
RATE_GENERAL_HEALTH: GOOD
RATE_QUALITY_OF_LIFE: VERY GOOD
EMOTIONAL_PROBLEMS: NEVER
CARRYOUT_PHYSICAL_ACTIVITIES: COMPLETELY
RATE_MENTAL_HEALTH: VERY GOOD
RATE_PHYSICAL_HEALTH: GOOD
RATE_SOCIAL_SATISFACTION: EXCELLENT
RATE_AVERAGE_PAIN: 2

## 2024-01-30 NOTE — PROGRESS NOTES
[facility]: [facility]  MEDICARE WELLNESS EXAM      Nicolette Hatch is a 78 y.o. female that is presenting today for Annual Exam (Concerned about UTI not being gone).    Assessment/Plan    Diagnoses and all orders for this visit:  Annual physical exam  Atherosclerosis of both carotid arteries  Paroxysmal atrial fibrillation (CMS/HCC)  Gastroesophageal reflux disease with esophagitis without hemorrhage  Generalized osteoarthritis  Essential hypertension  -     CBC and Auto Differential; Future  -     Comprehensive Metabolic Panel; Future  Mixed hyperlipidemia  -     Lipid Panel; Future  Prediabetes  -     Hemoglobin A1C; Future  Acquired hypothyroidism  -     levothyroxine (Synthroid, Levoxyl) 112 mcg tablet; Take 1 tablet (112 mcg) by mouth once daily.  -     TSH with reflex to Free T4 if abnormal; Future  Macrocytosis without anemia  Encounter for routine laboratory testing  -     Hemoglobin A1C; Future  Other screening mammogram  -     BI mammo bilateral screening tomosynthesis; Future  Stage 3a chronic kidney disease (CMS/HCC)  We completed the medicare wellness exam today.  The lifestyle is reviewed and recommendations for diet and exercise are made. We reviewed the immunizations and cancer screening and recommendations for needed immunizations and screenings are made.  The patient is independent in all ADL, shows no clinical signs of cognitive impairment, and is not falling or at risk for such.     In terms of reviewing her chronic medical needs/screenings she is doing very well with her immunizations.  She had COVID shot and flu shot this season.  She was aware of RSV vaccine and had decided not to get that.  She had Tdap in 2016 Shingrix in 2019 and 2020 and Prevnar 20 in 2022.  It would appear that all her immunizations are up-to-date.    In terms of other testing her mammogram is just overdue a we are going to order 1.  She had a bone density test November 2022 and therefore would not  be due again until November 2024.    Separate from her Medicare wellness exam we also evaluated and managed her chronic medical problems.  We reviewed her blood work that she had recently done.  Her TSH is slightly elevated and therefore we need to adjust her levothyroxine dosage upwards to 112 mcg/day.  Her prediabetes hyperlipidemia and hypertension are all under very good control with no changes needed there.    We also reviewed this recent episode that she had.  She does not feel that her urine is totally clear so we will repeat a urine sample.  I also could not verify her urine cultures I will order that as well.  In reviewing her ER information she had some vascular congestion on the chest x-ray.  This seems to have clinically resolved but it is very interesting because she also has some swelling in her ankles around that period of time.  I also would like to recheck her echocardiogram which was last done in 2021 to make sure there really has been no change in her EF.    I will plan on seeing her in 6 months.  We also discussed the implications of chronic kidney disease stage III today.      ADVANCED CARE PLANNING  Advanced Care Planning was discussed with patient:  The patient has an active advanced care plan on file. The patient has an active surrogate decision-maker on file.  Encouraged the patient to confirm that Living Will and Healthcare Power of  (HCPoA) are accurate and up to date.  Encouraged the patient to confirm that our office be provided a copy of any documentation in the event that anything changes.    ACTIVITIES OF DAILY LIVING  Basic ADLs:  Bathing: Independent, Dressing: Independent, Toileting: Independent, Transferring: Independent, Continence: Independent, Feeding: Independent.    Instrumental ADLs:  Ability to use phone: Independent, Shopping: Independent, Cooking: Independent, House-keeping: Independent, Laundry: Independent, Transportation: Independent, Medication Management:  Independent, Finance Management: Independent.    Subjective   She is here for her annual Medicare wellness exam.  In addition she brings forth the issue that she had with her recent bladder infection.  She had very significant symptoms including rigors was diagnosed with a UTI for which she was placed on antibiotics.  She actually feels significantly better she is not having any fever chills burning in the urine blood in the urine she does notice that the urine is still cloudy and has not fully gone back to its pre-UTI state.        Review of Systems   Constitutional: Negative.    HENT: Negative.     Eyes: Negative.    Respiratory: Negative.     Cardiovascular: Negative.    Gastrointestinal: Negative.    Endocrine: Negative.    Genitourinary: Negative.    Musculoskeletal: Negative.    Skin: Negative.    Allergic/Immunologic: Negative.    Neurological: Negative.    Hematological: Negative.    Psychiatric/Behavioral: Negative.     All other systems reviewed and are negative.    Objective   Vitals:    01/31/24 0925   BP: 126/80   Temp: 35.8 °C (96.4 °F)      Body mass index is 20.99 kg/m².  Physical Exam  Vitals and nursing note reviewed.   Constitutional:       General: She is not in acute distress.     Appearance: Normal appearance. She is not ill-appearing.   HENT:      Head: Normocephalic and atraumatic.      Right Ear: Tympanic membrane, ear canal and external ear normal. There is no impacted cerumen.      Left Ear: Tympanic membrane, ear canal and external ear normal. There is no impacted cerumen.      Nose: Nose normal.      Mouth/Throat:      Mouth: Mucous membranes are moist.      Pharynx: Oropharynx is clear. No oropharyngeal exudate or posterior oropharyngeal erythema.   Eyes:      General: No scleral icterus.        Right eye: No discharge.         Left eye: No discharge.      Extraocular Movements: Extraocular movements intact.      Conjunctiva/sclera: Conjunctivae normal.      Pupils: Pupils are equal,  round, and reactive to light.   Neck:      Vascular: No carotid bruit.   Cardiovascular:      Rate and Rhythm: Normal rate and regular rhythm.      Pulses: Normal pulses.      Heart sounds: Normal heart sounds. No murmur heard.     No friction rub. No gallop.   Pulmonary:      Effort: Pulmonary effort is normal. No respiratory distress.      Breath sounds: Normal breath sounds.   Abdominal:      General: Abdomen is flat. Bowel sounds are normal. There is no distension.      Palpations: Abdomen is soft.      Tenderness: There is no abdominal tenderness.      Hernia: No hernia is present.   Musculoskeletal:         General: No swelling or tenderness. Normal range of motion.   Lymphadenopathy:      Cervical: No cervical adenopathy.   Skin:     General: Skin is warm and dry.      Capillary Refill: Capillary refill takes less than 2 seconds.      Coloration: Skin is not jaundiced.      Findings: No rash.   Neurological:      General: No focal deficit present.      Mental Status: She is alert and oriented to person, place, and time. Mental status is at baseline.   Psychiatric:         Mood and Affect: Mood normal.         Behavior: Behavior normal.       Diagnostic Results   Lab Results   Component Value Date    GLUCOSE 98 01/24/2024    CALCIUM 8.9 01/24/2024     01/24/2024    K 4.4 01/24/2024    CO2 27 01/24/2024     01/24/2024    BUN 24 01/24/2024    CREATININE 1.10 01/24/2024     Lab Results   Component Value Date    ALT 23 01/24/2024    AST 23 01/24/2024    GGT 22 09/27/2022    ALKPHOS 117 01/24/2024    BILITOT 0.4 01/24/2024     Lab Results   Component Value Date    WBC 7.6 01/24/2024    HGB 13.5 01/24/2024    HCT 42.9 01/24/2024     (H) 01/24/2024     01/24/2024     Lab Results   Component Value Date    CHOL 130 (L) 01/24/2024    CHOL 140 07/24/2023    CHOL 165 01/10/2023     Lab Results   Component Value Date    HDL 36.0 (L) 01/24/2024    HDL 48 (L) 07/24/2023    HDL 56 01/10/2023     Lab  "Results   Component Value Date    LDLCALC 69 01/24/2024    LDLCALC 74 07/24/2023    LDLCALC 86 01/10/2023     Lab Results   Component Value Date    TRIG 126 01/24/2024    TRIG 91 07/24/2023    TRIG 113 01/10/2023     No components found for: \"CHOLHDL\"  Lab Results   Component Value Date    HGBA1C 5.6 01/24/2024     Other labs not included in the list above reviewed either before or during this encounter.    History   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    Allergic conjunctivitis 01/22/2024    Atherosclerosis of both carotid arteries 09/06/2023    Atrial fibrillation (CMS/HCC) 09/06/2023    Cellulitis 01/30/2023    Cervical spine arthritis 09/06/2023    Cervical spondylosis 09/06/2023    Chronic reflux esophagitis 09/06/2023    Contact with and (suspected) exposure to covid-19 09/06/2022    Degenerative joint disease involving multiple joints 09/06/2023    Dizziness and giddiness 12/13/2022    Epiphora due to insufficient drainage of both sides 01/28/2016    Essential hypertension 09/06/2023    Female cystocele 09/06/2023    Flexor tenosynovitis of finger 09/06/2023    Hyperlipidemia 09/06/2023    Hypothyroidism 09/06/2023    Left bundle branch block 09/06/2023    Lower urinary tract infectious disease 01/22/2024    Macrocytosis without anemia 09/06/2023    Mitral valve prolapse 09/06/2023    Prediabetes 09/06/2023    Primary osteoarthritis of right hip 09/06/2023    Unspecified abdominal pain 09/06/2022     Past Surgical History:   Procedure Laterality Date    CATARACT EXTRACTION Bilateral 2015    COLONOSCOPY  2008    COLONOSCOPY  2013    COLONOSCOPY  03/17/2018    COLPORRHAPHY  2021    A+P    ESOPHAGOGASTRODUODENOSCOPY  2008    FOOT SURGERY  2007    FOOT SURGERY Left 2019    [facility]    HAND SURGERY Left 2005    carpometacarpo arthroplasty    LUMBAR SPINE SURGERY  1994    TOTAL ABDOMINAL HYSTERECTOMY W/ BILATERAL SALPINGOOPHORECTOMY  1980    TOTAL KNEE ARTHROPLASTY Left "     August 9, 2023    VARICOSE VEIN SURGERY  2016    Dr. Mohseni     Family History   Problem Relation Name Age of Onset    Colon cancer Mother      COPD Father Champ     Lung disease Father Champ     Other (Calcified heart valves) Sister      Colon cancer Brother       Social History     Socioeconomic History    Marital status:      Spouse name: Not on file    Number of children: Not on file    Years of education: Not on file    Highest education level: Not on file   Occupational History    Not on file   Tobacco Use    Smoking status: Never    Smokeless tobacco: Never   Vaping Use    Vaping Use: Never used   Substance and Sexual Activity    Alcohol use: Never    Drug use: Never    Sexual activity: Never   Other Topics Concern    Not on file   Social History Narrative    Not on file     Social Determinants of Health     Financial Resource Strain: Not on file   Food Insecurity: Not on file   Transportation Needs: Not on file   Physical Activity: Not on file   Stress: Not on file   Social Connections: Not on file   Intimate Partner Violence: Not on file   Housing Stability: Not on file     Allergies   Allergen Reactions    Codeine Other, GI Upset, Nausea/vomiting and Drowsiness     Nausea    Flu-like sx    oversedation    Indomethacin Swelling    Nitrofurantoin Rash    Nitrofurantoin Monohyd/M-Cryst Rash     Current Outpatient Medications on File Prior to Visit   Medication Sig Dispense Refill    acetaminophen (Tylenol) 325 mg tablet Take 1 tablet (325 mg) by mouth if needed for mild pain (1 - 3).      amiodarone (Pacerone) 100 mg tablet Take 1 tablet (100 mg) by mouth once daily.      apixaban (Eliquis) 2.5 mg tablet Take 2 tablets (5 mg) by mouth 2 times a day.      atorvastatin (Lipitor) 40 mg tablet Take 1 tablet (40 mg) by mouth once daily.      cholecalciferol (Vitamin D-3) 25 MCG (1000 UT) capsule Take 1 capsule (25 mcg) by mouth once daily.      cyanocobalamin (Vitamin B-12) 1,000 mcg tablet Take 1 tablet  (1,000 mcg) by mouth once daily.      fexofenadine (Allegra) 180 mg tablet Take 1 tablet (180 mg) by mouth once daily.      ipratropium (Atrovent) 42 mcg (0.06 %) nasal spray Administer 2 sprays into each nostril 3 times a day.      losartan (Cozaar) 25 mg tablet Take 1 tablet (25 mg) by mouth once daily.      MAGNESIUM ORAL Magnesium 500 MG  1 tablet with a meal Orally Once a day      [DISCONTINUED] levothyroxine (Synthroid, Levoxyl) 100 mcg tablet Take 1 tablet (100 mcg) by mouth once daily in the morning. Take before meals.      [] acetaminophen (Tylenol) 325 mg tablet Take 3 tablets (975 mg) by mouth every 6 hours if needed for mild pain (1 - 3) for up to 10 days. 30 tablet 0    [] doxycycline (Vibramycin) 100 mg capsule Take 1 capsule (100 mg) by mouth 2 times a day for 7 days. Take with at least 8 ounces (large glass) of water, do not lie down for 30 minutes after 14 capsule 0    furosemide (Lasix) 40 mg tablet Take 1 tablet (40 mg) by mouth once daily. 3 tablet 11     No current facility-administered medications on file prior to visit.     Immunization History   Administered Date(s) Administered    Flu vaccine, quadrivalent, high-dose, preservative free, age 65y+ (FLUZONE) 10/08/2020, 2021, 10/05/2022, 2023    Influenza, High Dose Seasonal, Preservative Free 2015, 10/17/2016, 2017, 10/22/2018, 10/07/2019    Influenza, seasonal, injectable 10/18/2010, 10/10/2011, 10/29/2012, 2013, 10/07/2014    Moderna COVID-19 vaccine, Fall , 12 yeasrs and older (50mcg/0.5mL) 2023    Moderna COVID-19 vaccine, bivalent, blue cap/gray label *Check age/dose* 2022, 2023    Moderna SARS-CoV-2 Vaccination 2021, 2021, 2021    Pneumococcal conjugate vaccine, 13-valent (PREVNAR 13) 2015    Pneumococcal conjugate vaccine, 20-valent (PREVNAR 20) 10/05/2022    Pneumococcal polysaccharide vaccine, 23-valent, age 2 years and older (PNEUMOVAX 23)  07/29/2008, 09/24/2013    Td vaccine, age 7 years and older (TDVAX) 07/29/2005    Tdap vaccine, age 7 year and older (BOOSTRIX, ADACEL) 01/18/2016    Zoster vaccine, recombinant, adult (SHINGRIX) 02/07/2019, 08/28/2020    Zoster, live 02/29/2008     Patient's medical history was reviewed and updated either before or during this encounter.     Oliver Mcgraw MD

## 2024-01-31 ENCOUNTER — OFFICE VISIT (OUTPATIENT)
Dept: PRIMARY CARE | Facility: CLINIC | Age: 79
End: 2024-01-31
Payer: MEDICARE

## 2024-01-31 ENCOUNTER — LAB (OUTPATIENT)
Dept: LAB | Facility: LAB | Age: 79
End: 2024-01-31
Payer: MEDICARE

## 2024-01-31 VITALS
SYSTOLIC BLOOD PRESSURE: 126 MMHG | TEMPERATURE: 96.4 F | DIASTOLIC BLOOD PRESSURE: 80 MMHG | WEIGHT: 134 LBS | BODY MASS INDEX: 20.99 KG/M2

## 2024-01-31 DIAGNOSIS — N30.00 ACUTE CYSTITIS WITHOUT HEMATURIA: ICD-10-CM

## 2024-01-31 DIAGNOSIS — N18.31 STAGE 3A CHRONIC KIDNEY DISEASE (MULTI): ICD-10-CM

## 2024-01-31 DIAGNOSIS — I10 ESSENTIAL HYPERTENSION: ICD-10-CM

## 2024-01-31 DIAGNOSIS — R73.03 PREDIABETES: ICD-10-CM

## 2024-01-31 DIAGNOSIS — E78.2 MIXED HYPERLIPIDEMIA: ICD-10-CM

## 2024-01-31 DIAGNOSIS — Z00.00 ANNUAL PHYSICAL EXAM: Primary | ICD-10-CM

## 2024-01-31 DIAGNOSIS — Z01.89 ENCOUNTER FOR ROUTINE LABORATORY TESTING: ICD-10-CM

## 2024-01-31 DIAGNOSIS — I48.0 PAROXYSMAL ATRIAL FIBRILLATION (MULTI): ICD-10-CM

## 2024-01-31 DIAGNOSIS — E03.9 ACQUIRED HYPOTHYROIDISM: ICD-10-CM

## 2024-01-31 DIAGNOSIS — Z12.31 OTHER SCREENING MAMMOGRAM: ICD-10-CM

## 2024-01-31 DIAGNOSIS — K21.00 GASTROESOPHAGEAL REFLUX DISEASE WITH ESOPHAGITIS WITHOUT HEMORRHAGE: ICD-10-CM

## 2024-01-31 DIAGNOSIS — M15.9 GENERALIZED OSTEOARTHRITIS: ICD-10-CM

## 2024-01-31 DIAGNOSIS — I65.23 ATHEROSCLEROSIS OF BOTH CAROTID ARTERIES: ICD-10-CM

## 2024-01-31 DIAGNOSIS — D75.89 MACROCYTOSIS WITHOUT ANEMIA: ICD-10-CM

## 2024-01-31 LAB
APPEARANCE UR: CLEAR
BILIRUB UR STRIP.AUTO-MCNC: NEGATIVE MG/DL
COLOR UR: NORMAL
GLUCOSE UR STRIP.AUTO-MCNC: NORMAL MG/DL
KETONES UR STRIP.AUTO-MCNC: NEGATIVE MG/DL
LEUKOCYTE ESTERASE UR QL STRIP.AUTO: NEGATIVE
NITRITE UR QL STRIP.AUTO: NEGATIVE
PH UR STRIP.AUTO: 5.5 [PH]
PROT UR STRIP.AUTO-MCNC: NEGATIVE MG/DL
RBC # UR STRIP.AUTO: NEGATIVE /UL
SP GR UR STRIP.AUTO: 1.02
UROBILINOGEN UR STRIP.AUTO-MCNC: NORMAL MG/DL

## 2024-01-31 PROCEDURE — 3074F SYST BP LT 130 MM HG: CPT | Performed by: INTERNAL MEDICINE

## 2024-01-31 PROCEDURE — G0439 PPPS, SUBSEQ VISIT: HCPCS | Performed by: INTERNAL MEDICINE

## 2024-01-31 PROCEDURE — 81003 URINALYSIS AUTO W/O SCOPE: CPT

## 2024-01-31 PROCEDURE — 1126F AMNT PAIN NOTED NONE PRSNT: CPT | Performed by: INTERNAL MEDICINE

## 2024-01-31 PROCEDURE — 1159F MED LIST DOCD IN RCRD: CPT | Performed by: INTERNAL MEDICINE

## 2024-01-31 PROCEDURE — 3079F DIAST BP 80-89 MM HG: CPT | Performed by: INTERNAL MEDICINE

## 2024-01-31 PROCEDURE — 99213 OFFICE O/P EST LOW 20 MIN: CPT | Mod: 25 | Performed by: INTERNAL MEDICINE

## 2024-01-31 PROCEDURE — 1124F ACP DISCUSS-NO DSCNMKR DOCD: CPT | Performed by: INTERNAL MEDICINE

## 2024-01-31 PROCEDURE — 87086 URINE CULTURE/COLONY COUNT: CPT

## 2024-01-31 PROCEDURE — 99213 OFFICE O/P EST LOW 20 MIN: CPT | Performed by: INTERNAL MEDICINE

## 2024-01-31 PROCEDURE — 1036F TOBACCO NON-USER: CPT | Performed by: INTERNAL MEDICINE

## 2024-01-31 PROCEDURE — 99215 OFFICE O/P EST HI 40 MIN: CPT | Mod: 25 | Performed by: INTERNAL MEDICINE

## 2024-01-31 PROCEDURE — 87186 SC STD MICRODIL/AGAR DIL: CPT

## 2024-01-31 RX ORDER — LEVOTHYROXINE SODIUM 112 UG/1
112 TABLET ORAL DAILY
Qty: 90 TABLET | Refills: 3 | Status: SHIPPED | OUTPATIENT
Start: 2024-01-31 | End: 2025-01-30

## 2024-01-31 RX ORDER — ACETAMINOPHEN 325 MG/1
325 TABLET ORAL AS NEEDED
COMMUNITY

## 2024-01-31 ASSESSMENT — ENCOUNTER SYMPTOMS
MUSCULOSKELETAL NEGATIVE: 1
PSYCHIATRIC NEGATIVE: 1
ALLERGIC/IMMUNOLOGIC NEGATIVE: 1
RESPIRATORY NEGATIVE: 1
HEMATOLOGIC/LYMPHATIC NEGATIVE: 1
DEPRESSION: 0
NEUROLOGICAL NEGATIVE: 1
CARDIOVASCULAR NEGATIVE: 1
LOSS OF SENSATION IN FEET: 0
GASTROINTESTINAL NEGATIVE: 1
OCCASIONAL FEELINGS OF UNSTEADINESS: 0
EYES NEGATIVE: 1
CONSTITUTIONAL NEGATIVE: 1
ENDOCRINE NEGATIVE: 1

## 2024-01-31 ASSESSMENT — PATIENT HEALTH QUESTIONNAIRE - PHQ9
SUM OF ALL RESPONSES TO PHQ9 QUESTIONS 1 AND 2: 0
2. FEELING DOWN, DEPRESSED OR HOPELESS: NOT AT ALL
1. LITTLE INTEREST OR PLEASURE IN DOING THINGS: NOT AT ALL

## 2024-01-31 ASSESSMENT — PAIN SCALES - GENERAL: PAINLEVEL: 0-NO PAIN

## 2024-01-31 NOTE — PATIENT INSTRUCTIONS
We completed the elements of your Medicare wellness exam today.  As a relates to your screenings I am glad to see that your immunizations are all fully up-to-date.  Namely you had Tdap (whooping cough and tetanus) in 2016 and this will not be due again until 2026.  You will not need any further shingles vaccines as these were already done in 2019 and 2020 Leslie you need further Pneumovax because you were given Prevnar 20 pneumonia vaccine in 2022.  I saw that you had your COVID and flu shot this season.  These likely will be annual things in the fall.  In terms of additional testing we identified that you are due for mammogram.  You will be due for bone density test until the end of November of this year.    In terms of your medications as you know we adjusted your thyroid dosage today as you are slightly under active/under replaced on the levothyroxine 100 mcg/day we have increased this to 112 mcg/day.  We also reviewed the reports from the emergency room.  I am ordering an echocardiogram to follow-up on that congestion seen on your chest x-ray although clinically it seems to have resolved.  We also will order a follow-up urine culture since your symptoms/cloudiness of the urine have not fully resolved.  I will let you know about those results.    Otherwise I will plan on seeing you back in 6 months unless you need me.

## 2024-02-01 ENCOUNTER — TELEPHONE (OUTPATIENT)
Dept: PRIMARY CARE | Facility: CLINIC | Age: 79
End: 2024-02-01
Payer: MEDICARE

## 2024-02-01 NOTE — TELEPHONE ENCOUNTER
Pt asking if there are any further instructions regarding recent UA.  Please advise.  Ph: [phone]    Results show normal but was sent for culture which has not resulted yet, somewhat conflicting.

## 2024-02-02 LAB — BACTERIA UR CULT: ABNORMAL

## 2024-02-06 ENCOUNTER — HOSPITAL ENCOUNTER (OUTPATIENT)
Dept: RADIOLOGY | Facility: CLINIC | Age: 79
Discharge: HOME | End: 2024-02-06
Payer: MEDICARE

## 2024-02-06 VITALS — WEIGHT: 134 LBS | HEIGHT: 67 IN | BODY MASS INDEX: 21.03 KG/M2

## 2024-02-06 DIAGNOSIS — Z12.31 OTHER SCREENING MAMMOGRAM: ICD-10-CM

## 2024-02-06 PROCEDURE — 77067 SCR MAMMO BI INCL CAD: CPT

## 2024-02-16 ENCOUNTER — HOSPITAL ENCOUNTER (OUTPATIENT)
Dept: CARDIOLOGY | Facility: HOSPITAL | Age: 79
Discharge: HOME | End: 2024-02-16
Payer: MEDICARE

## 2024-02-16 DIAGNOSIS — I48.0 PAROXYSMAL ATRIAL FIBRILLATION (MULTI): ICD-10-CM

## 2024-02-16 LAB
AORTIC VALVE MEAN GRADIENT: 7 MMHG
AORTIC VALVE PEAK VELOCITY: 1.82 M/S
AV PEAK GRADIENT: 13.2 MMHG
AVA (PEAK VEL): 1.29 CM2
AVA (VTI): 1.27 CM2
EJECTION FRACTION APICAL 4 CHAMBER: 50.1
EJECTION FRACTION: 49 %
LEFT ATRIUM VOLUME AREA LENGTH INDEX BSA: 24 ML/M2
LEFT VENTRICLE INTERNAL DIMENSION DIASTOLE: 4.19 CM (ref 3.5–6)
LEFT VENTRICULAR OUTFLOW TRACT DIAMETER: 1.9 CM
MITRAL VALVE E/A RATIO: 0.62
MITRAL VALVE E/E' RATIO: 23.33
RIGHT VENTRICLE FREE WALL PEAK S': 8.92 CM/S
RIGHT VENTRICLE PEAK SYSTOLIC PRESSURE: 25.8 MMHG
TRICUSPID ANNULAR PLANE SYSTOLIC EXCURSION: 2.4 CM

## 2024-02-16 PROCEDURE — 93306 TTE W/DOPPLER COMPLETE: CPT | Performed by: INTERNAL MEDICINE

## 2024-02-16 PROCEDURE — 93306 TTE W/DOPPLER COMPLETE: CPT

## 2024-02-29 ENCOUNTER — DOCUMENTATION (OUTPATIENT)
Dept: PRIMARY CARE | Facility: CLINIC | Age: 79
End: 2024-02-29
Payer: MEDICARE

## 2024-02-29 NOTE — PROGRESS NOTES
We reviewed her echocardiogram - she has no sob or leg swelling - echo w ef 45-50% - alredy on an arb among other things - will leave meds as is - if any change in her status she will contact dr singh.

## 2024-03-01 ENCOUNTER — TELEPHONE (OUTPATIENT)
Dept: PRIMARY CARE | Facility: CLINIC | Age: 79
End: 2024-03-01
Payer: MEDICARE

## 2024-03-15 DIAGNOSIS — I48.0 PAROXYSMAL ATRIAL FIBRILLATION (MULTI): ICD-10-CM

## 2024-03-16 RX ORDER — AMIODARONE HYDROCHLORIDE 100 MG/1
100 TABLET ORAL DAILY
Qty: 90 TABLET | Refills: 4 | Status: SHIPPED | OUTPATIENT
Start: 2024-03-16

## 2024-03-22 ENCOUNTER — TELEPHONE (OUTPATIENT)
Dept: CARDIOLOGY | Facility: CLINIC | Age: 79
End: 2024-03-22
Payer: MEDICARE

## 2024-03-22 NOTE — TELEPHONE ENCOUNTER
Patient called, stated she is scheduled for foot surgery 04/03/24 and surgeon asking if she is on a blood thinner she needs clearance from her cardiologist to proceed. Please advise.   Fax#[phone]

## 2024-03-28 ENCOUNTER — TELEPHONE (OUTPATIENT)
Dept: PRIMARY CARE | Facility: CLINIC | Age: 79
End: 2024-03-28

## 2024-03-28 ENCOUNTER — TELEPHONE (OUTPATIENT)
Dept: CARDIOLOGY | Facility: CLINIC | Age: 79
End: 2024-03-28
Payer: MEDICARE

## 2024-03-28 DIAGNOSIS — U07.1 COVID: Primary | ICD-10-CM

## 2024-03-28 NOTE — TELEPHONE ENCOUNTER
Sinus congestion, sore throat and cough since last night. Positive COVID test this morning. Not taking anything OTC. Please advise.

## 2024-03-28 NOTE — TELEPHONE ENCOUNTER
Patient called the office today to have us fax her last office note to her ortho for her surgery.  She is asking if you can add that she is clear to have surgery to that visit and we can fax to that office?  Please advise, thanks  Call back [phone]    Ortho fax [phone]    Faxed office note from January to ortho for her clearance.

## 2024-04-10 DIAGNOSIS — I48.0 PAROXYSMAL ATRIAL FIBRILLATION (MULTI): ICD-10-CM

## 2024-04-11 RX ORDER — LOSARTAN POTASSIUM 25 MG/1
TABLET ORAL
Qty: 90 TABLET | Refills: 3 | Status: SHIPPED | OUTPATIENT
Start: 2024-04-11

## 2024-05-08 ENCOUNTER — LAB (OUTPATIENT)
Dept: LAB | Facility: LAB | Age: 79
End: 2024-05-08
Payer: MEDICARE

## 2024-05-08 DIAGNOSIS — M17.11 UNILATERAL PRIMARY OSTEOARTHRITIS, RIGHT KNEE: Primary | ICD-10-CM

## 2024-05-08 LAB
CRP SERPL-MCNC: 2 MG/DL (ref 0–2)
ERYTHROCYTE [DISTWIDTH] IN BLOOD BY AUTOMATED COUNT: 13.4 % (ref 11.5–14.5)
ERYTHROCYTE [SEDIMENTATION RATE] IN BLOOD BY WESTERGREN METHOD: 30 MM/H (ref 0–30)
HCT VFR BLD AUTO: 41.9 % (ref 36–46)
HGB BLD-MCNC: 13.3 G/DL (ref 12–16)
MCH RBC QN AUTO: 32.4 PG (ref 26–34)
MCHC RBC AUTO-ENTMCNC: 31.7 G/DL (ref 32–36)
MCV RBC AUTO: 102 FL (ref 80–100)
NRBC BLD-RTO: 0 /100 WBCS (ref 0–0)
PLATELET # BLD AUTO: 247 X10*3/UL (ref 150–450)
RBC # BLD AUTO: 4.1 X10*6/UL (ref 4–5.2)
WBC # BLD AUTO: 9.4 X10*3/UL (ref 4.4–11.3)

## 2024-05-08 PROCEDURE — 85027 COMPLETE CBC AUTOMATED: CPT

## 2024-05-08 PROCEDURE — 85652 RBC SED RATE AUTOMATED: CPT

## 2024-05-08 PROCEDURE — 36415 COLL VENOUS BLD VENIPUNCTURE: CPT

## 2024-05-08 PROCEDURE — 86140 C-REACTIVE PROTEIN: CPT

## 2024-05-13 DIAGNOSIS — K86.2 PANCREAS CYST (HHS-HCC): ICD-10-CM

## 2024-05-13 DIAGNOSIS — Q45.3 ABNORMALITY OF PANCREATIC DUCT: Primary | ICD-10-CM

## 2024-05-21 ENCOUNTER — LAB (OUTPATIENT)
Dept: LAB | Facility: LAB | Age: 79
End: 2024-05-21
Payer: MEDICARE

## 2024-05-21 ENCOUNTER — OFFICE VISIT (OUTPATIENT)
Dept: PRIMARY CARE | Facility: CLINIC | Age: 79
End: 2024-05-21
Payer: MEDICARE

## 2024-05-21 VITALS
OXYGEN SATURATION: 99 % | SYSTOLIC BLOOD PRESSURE: 120 MMHG | BODY MASS INDEX: 21.5 KG/M2 | WEIGHT: 137 LBS | HEIGHT: 67 IN | DIASTOLIC BLOOD PRESSURE: 72 MMHG | TEMPERATURE: 98.1 F | HEART RATE: 69 BPM

## 2024-05-21 DIAGNOSIS — Z01.818 PREOP EXAM FOR INTERNAL MEDICINE: ICD-10-CM

## 2024-05-21 DIAGNOSIS — N39.0 URINARY TRACT INFECTION WITH HEMATURIA, SITE UNSPECIFIED: Primary | ICD-10-CM

## 2024-05-21 DIAGNOSIS — R31.9 URINARY TRACT INFECTION WITH HEMATURIA, SITE UNSPECIFIED: Primary | ICD-10-CM

## 2024-05-21 DIAGNOSIS — Z01.818 PREOP EXAM FOR INTERNAL MEDICINE: Primary | ICD-10-CM

## 2024-05-21 PROBLEM — N18.31 STAGE 3A CHRONIC KIDNEY DISEASE (MULTI): Status: ACTIVE | Noted: 2024-05-21

## 2024-05-21 PROBLEM — I65.23 ATHEROSCLEROSIS OF BOTH CAROTID ARTERIES: Status: RESOLVED | Noted: 2023-09-06 | Resolved: 2024-05-21

## 2024-05-21 PROBLEM — R09.89 PULMONARY VENOUS CONGESTION: Status: ACTIVE | Noted: 2024-05-21

## 2024-05-21 PROBLEM — R35.0 FREQUENCY OF URINATION: Status: RESOLVED | Noted: 2022-09-06 | Resolved: 2024-05-21

## 2024-05-21 PROBLEM — R07.9 INTERMITTENT CHEST PAIN: Status: RESOLVED | Noted: 2024-05-21 | Resolved: 2024-05-21

## 2024-05-21 PROBLEM — Z88.6 ALLERGY STATUS TO ANALGESIC AGENT: Status: ACTIVE | Noted: 2023-01-30

## 2024-05-21 LAB
ANION GAP SERPL CALC-SCNC: 10 MMOL/L
APPEARANCE UR: CLEAR
BACTERIA #/AREA URNS AUTO: ABNORMAL /HPF
BILIRUB UR STRIP.AUTO-MCNC: NEGATIVE MG/DL
BUN SERPL-MCNC: 24 MG/DL (ref 8–25)
CALCIUM SERPL-MCNC: 9.2 MG/DL (ref 8.5–10.4)
CHLORIDE SERPL-SCNC: 104 MMOL/L (ref 97–107)
CO2 SERPL-SCNC: 26 MMOL/L (ref 24–31)
COLOR UR: ABNORMAL
CREAT SERPL-MCNC: 0.9 MG/DL (ref 0.4–1.6)
EGFRCR SERPLBLD CKD-EPI 2021: 64 ML/MIN/1.73M*2
ERYTHROCYTE [DISTWIDTH] IN BLOOD BY AUTOMATED COUNT: 13.5 % (ref 11.5–14.5)
GLUCOSE SERPL-MCNC: 93 MG/DL (ref 65–99)
GLUCOSE UR STRIP.AUTO-MCNC: NORMAL MG/DL
HCT VFR BLD AUTO: 42.3 % (ref 36–46)
HGB BLD-MCNC: 13.4 G/DL (ref 12–16)
HOLD SPECIMEN: NORMAL
KETONES UR STRIP.AUTO-MCNC: NEGATIVE MG/DL
LEUKOCYTE ESTERASE UR QL STRIP.AUTO: NEGATIVE
MCH RBC QN AUTO: 32.5 PG (ref 26–34)
MCHC RBC AUTO-ENTMCNC: 31.7 G/DL (ref 32–36)
MCV RBC AUTO: 103 FL (ref 80–100)
MUCOUS THREADS #/AREA URNS AUTO: ABNORMAL /LPF
NITRITE UR QL STRIP.AUTO: ABNORMAL
NRBC BLD-RTO: 0 /100 WBCS (ref 0–0)
PH UR STRIP.AUTO: 6 [PH]
PLATELET # BLD AUTO: 301 X10*3/UL (ref 150–450)
POTASSIUM SERPL-SCNC: 4.6 MMOL/L (ref 3.4–5.1)
PROT UR STRIP.AUTO-MCNC: NEGATIVE MG/DL
RBC # BLD AUTO: 4.12 X10*6/UL (ref 4–5.2)
RBC # UR STRIP.AUTO: ABNORMAL /UL
RBC #/AREA URNS AUTO: ABNORMAL /HPF
SODIUM SERPL-SCNC: 140 MMOL/L (ref 133–145)
SP GR UR STRIP.AUTO: 1.02
UROBILINOGEN UR STRIP.AUTO-MCNC: NORMAL MG/DL
WBC # BLD AUTO: 7.1 X10*3/UL (ref 4.4–11.3)
WBC #/AREA URNS AUTO: ABNORMAL /HPF

## 2024-05-21 PROCEDURE — 99213 OFFICE O/P EST LOW 20 MIN: CPT | Performed by: LICENSED PRACTICAL NURSE

## 2024-05-21 PROCEDURE — 36415 COLL VENOUS BLD VENIPUNCTURE: CPT

## 2024-05-21 PROCEDURE — 1160F RVW MEDS BY RX/DR IN RCRD: CPT | Performed by: LICENSED PRACTICAL NURSE

## 2024-05-21 PROCEDURE — 80048 BASIC METABOLIC PNL TOTAL CA: CPT

## 2024-05-21 PROCEDURE — 85027 COMPLETE CBC AUTOMATED: CPT

## 2024-05-21 PROCEDURE — 1125F AMNT PAIN NOTED PAIN PRSNT: CPT | Performed by: LICENSED PRACTICAL NURSE

## 2024-05-21 PROCEDURE — 1036F TOBACCO NON-USER: CPT | Performed by: LICENSED PRACTICAL NURSE

## 2024-05-21 PROCEDURE — 87186 SC STD MICRODIL/AGAR DIL: CPT

## 2024-05-21 PROCEDURE — 3074F SYST BP LT 130 MM HG: CPT | Performed by: LICENSED PRACTICAL NURSE

## 2024-05-21 PROCEDURE — 81001 URINALYSIS AUTO W/SCOPE: CPT

## 2024-05-21 PROCEDURE — 3078F DIAST BP <80 MM HG: CPT | Performed by: LICENSED PRACTICAL NURSE

## 2024-05-21 PROCEDURE — 1159F MED LIST DOCD IN RCRD: CPT | Performed by: LICENSED PRACTICAL NURSE

## 2024-05-21 PROCEDURE — 87086 URINE CULTURE/COLONY COUNT: CPT

## 2024-05-21 ASSESSMENT — LIFESTYLE VARIABLES
HAVE YOU OR SOMEONE ELSE BEEN INJURED AS A RESULT OF YOUR DRINKING: NO
HOW OFTEN DURING THE LAST YEAR HAVE YOU HAD A FEELING OF GUILT OR REMORSE AFTER DRINKING: NEVER
HOW OFTEN DO YOU HAVE SIX OR MORE DRINKS ON ONE OCCASION: NEVER
SKIP TO QUESTIONS 9-10: 1
HOW OFTEN DURING THE LAST YEAR HAVE YOU NEEDED AN ALCOHOLIC DRINK FIRST THING IN THE MORNING TO GET YOURSELF GOING AFTER A NIGHT OF HEAVY DRINKING: NEVER
AUDIT-C TOTAL SCORE: 0
HAS A RELATIVE, FRIEND, DOCTOR, OR ANOTHER HEALTH PROFESSIONAL EXPRESSED CONCERN ABOUT YOUR DRINKING OR SUGGESTED YOU CUT DOWN: NO
AUDIT TOTAL SCORE: 0
HOW OFTEN DURING THE LAST YEAR HAVE YOU FOUND THAT YOU WERE NOT ABLE TO STOP DRINKING ONCE YOU HAD STARTED: NEVER
HOW OFTEN DURING THE LAST YEAR HAVE YOU FAILED TO DO WHAT WAS NORMALLY EXPECTED FROM YOU BECAUSE OF DRINKING: NEVER
HOW OFTEN DURING THE LAST YEAR HAVE YOU BEEN UNABLE TO REMEMBER WHAT HAPPENED THE NIGHT BEFORE BECAUSE YOU HAD BEEN DRINKING: NEVER
HOW MANY STANDARD DRINKS CONTAINING ALCOHOL DO YOU HAVE ON A TYPICAL DAY: PATIENT DOES NOT DRINK
HOW OFTEN DO YOU HAVE A DRINK CONTAINING ALCOHOL: NEVER

## 2024-05-21 ASSESSMENT — ENCOUNTER SYMPTOMS
LOSS OF SENSATION IN FEET: 0
DEPRESSION: 0
OCCASIONAL FEELINGS OF UNSTEADINESS: 0

## 2024-05-21 ASSESSMENT — PAIN SCALES - GENERAL: PAINLEVEL: 8

## 2024-05-21 NOTE — PROGRESS NOTES
[facility]: [facility]  PROGRESS NOTE      Nicolette Hatch is a 79 y.o. female that is presenting today for SURGICAL CLEARANCE.      Subjective   Pt presents to the office today for preoperative medical clearance for a right total knee arthroplasty on June 5, 2024 with Dr. Brendan Mills at [facility].  She is not scheduled for preadmission testing, but will require CBC, BMP and urinalysis.  She reports that her chronic medical conditions are stable and that she is without new health complaints.  She is able to walk up a flight of stairs and complete household chores without any respiratory distress.      Review of Systems   All other systems reviewed and are negative.     Objective   Vitals:    05/21/24 1054   BP: 120/72   Pulse: 69   Temp: 36.7 °C (98.1 °F)   SpO2: 99%      Body mass index is 21.46 kg/m².  Physical Exam  Neck:      Vascular: No carotid bruit.   Cardiovascular:      Rate and Rhythm: Normal rate and regular rhythm.      Comments: Right trace LLE edema  Pulmonary:      Effort: Pulmonary effort is normal. No respiratory distress.      Breath sounds: Normal breath sounds. No decreased breath sounds, wheezing, rhonchi or rales.   Musculoskeletal:      Right lower leg: Edema present.       Diagnostic Results   Lab Results   Component Value Date    GLUCOSE 98 01/24/2024    CALCIUM 8.9 01/24/2024     01/24/2024    K 4.4 01/24/2024    CO2 27 01/24/2024     01/24/2024    BUN 24 01/24/2024    CREATININE 1.10 01/24/2024     Lab Results   Component Value Date    ALT 23 01/24/2024    AST 23 01/24/2024    GGT 22 09/27/2022    ALKPHOS 117 01/24/2024    BILITOT 0.4 01/24/2024     Lab Results   Component Value Date    WBC 9.4 05/08/2024    HGB 13.3 05/08/2024    HCT 41.9 05/08/2024     (H) 05/08/2024     05/08/2024     Lab Results   Component Value Date    CHOL 130 (L) 01/24/2024    CHOL 140 07/24/2023    CHOL 165 01/10/2023     Lab  "Results   Component Value Date    HDL 36.0 (L) 01/24/2024    HDL 48 (L) 07/24/2023    HDL 56 01/10/2023     Lab Results   Component Value Date    LDLCALC 69 01/24/2024    LDLCALC 74 07/24/2023    LDLCALC 86 01/10/2023     Lab Results   Component Value Date    TRIG 126 01/24/2024    TRIG 91 07/24/2023    TRIG 113 01/10/2023     No components found for: \"CHOLHDL\"  Lab Results   Component Value Date    HGBA1C 5.6 01/24/2024     Other labs not included in the list above were reviewed either before or during this encounter.    History    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    Allergic conjunctivitis 01/22/2024    Atherosclerosis of both carotid arteries 09/06/2023    Atrial fibrillation (Multi) 09/06/2023    Cellulitis 01/30/2023    Cervical spine arthritis 09/06/2023    Cervical spondylosis 09/06/2023    Chronic reflux esophagitis 09/06/2023    Contact with and (suspected) exposure to covid-19 09/06/2022    Degenerative joint disease involving multiple joints 09/06/2023    Dizziness and giddiness 12/13/2022    Epiphora due to insufficient drainage of both sides 01/28/2016    Essential hypertension 09/06/2023    Female cystocele 09/06/2023    Flexor tenosynovitis of finger 09/06/2023    Frequency of urination 09/06/2022    Hyperlipidemia 09/06/2023    Hypothyroidism 09/06/2023    Intermittent chest pain 05/21/2024    Left bundle branch block 09/06/2023    Lower urinary tract infectious disease 01/22/2024    Macrocytosis without anemia 09/06/2023    Mitral valve prolapse 09/06/2023    Prediabetes 09/06/2023    Primary osteoarthritis of right hip 09/06/2023    Unspecified abdominal pain 09/06/2022     Past Surgical History:   Procedure Laterality Date    CATARACT EXTRACTION Bilateral 2015    COLONOSCOPY  2008    COLONOSCOPY  2013    COLONOSCOPY  03/17/2018    COLPORRHAPHY  2021    A+P    ESOPHAGOGASTRODUODENOSCOPY  2008    FOOT SURGERY  2007    FOOT SURGERY Left 2019    " [email or web address]    HAND SURGERY Left 2005    carpometacarpo arthroplasty    LUMBAR SPINE SURGERY  1994    TOTAL ABDOMINAL HYSTERECTOMY W/ BILATERAL SALPINGOOPHORECTOMY  1980    TOTAL KNEE ARTHROPLASTY Left     August 9, 2023    VARICOSE VEIN SURGERY  2016    Dr. Mohseni     Family History   Problem Relation Name Age of Onset    Colon cancer Mother      COPD Father Champ     Lung disease Father Champ     Other (Calcified heart valves) Sister      Colon cancer Brother       Social History     Socioeconomic History    Marital status:      Spouse name: Not on file    Number of children: Not on file    Years of education: Not on file    Highest education level: Not on file   Occupational History    Not on file   Tobacco Use    Smoking status: Never    Smokeless tobacco: Never   Vaping Use    Vaping status: Never Used   Substance and Sexual Activity    Alcohol use: Never    Drug use: Never    Sexual activity: Never   Other Topics Concern    Not on file   Social History Narrative    Not on file     Social Determinants of Health     Financial Resource Strain: Not on file   Food Insecurity: Not on file   Transportation Needs: Not on file   Physical Activity: Not on file   Stress: Not on file   Social Connections: Not on file   Intimate Partner Violence: Not on file   Housing Stability: Not on file     Allergies   Allergen Reactions    Codeine Other, GI Upset, Nausea/vomiting and Drowsiness     Nausea    Flu-like sx    oversedation    Indomethacin Swelling    Nitrofurantoin Rash    Nitrofurantoin Monohyd/M-Cryst Rash     Current Outpatient Medications on File Prior to Visit   Medication Sig Dispense Refill    acetaminophen (Tylenol) 325 mg tablet Take 1 tablet (325 mg) by mouth if needed for mild pain (1 - 3).      amiodarone (Pacerone) 100 mg tablet TAKE 1 TABLET BY MOUTH ONCE DAILY 90 tablet 4    apixaban (Eliquis) 2.5 mg tablet Take 2 tablets (5 mg) by mouth 2 times a day.      atorvastatin (Lipitor) 40 mg tablet Take 1 tablet  (40 mg) by mouth once daily.      cholecalciferol (Vitamin D-3) 25 MCG (1000 UT) capsule Take 1 capsule (25 mcg) by mouth once daily.      cyanocobalamin (Vitamin B-12) 1,000 mcg tablet Take 1 tablet (1,000 mcg) by mouth once daily.      fexofenadine (Allegra) 180 mg tablet Take 1 tablet (180 mg) by mouth if needed.      ipratropium (Atrovent) 42 mcg (0.06 %) nasal spray Administer 2 sprays into each nostril 3 times a day.      Lactobacillus acidophilus (ACIDOPHILUS ORAL) Take by mouth once daily. PROBIOTIC      levothyroxine (Synthroid, Levoxyl) 112 mcg tablet Take 1 tablet (112 mcg) by mouth once daily. 90 tablet 3    losartan (Cozaar) 25 mg tablet take 1 tablet Orally Once a day. 90 tablet 3    MAGNESIUM ORAL Magnesium 500 MG  1 tablet with a meal Orally Once a day      furosemide (Lasix) 40 mg tablet Take 1 tablet (40 mg) by mouth once daily. (Patient not taking: Reported on 5/21/2024) 3 tablet 11     No current facility-administered medications on file prior to visit.     Immunization History   Administered Date(s) Administered    Flu vaccine, quadrivalent, high-dose, preservative free, age 65y+ (FLUZONE) 10/08/2020, 09/23/2021, 10/05/2022, 09/20/2023    Influenza, High Dose Seasonal, Preservative Free 11/03/2015, 10/17/2016, 09/27/2017, 10/22/2018, 10/07/2019    Influenza, seasonal, injectable 10/18/2010, 10/10/2011, 10/29/2012, 09/24/2013, 10/07/2014    Moderna COVID-19 vaccine, Fall 2023, 12 yeasrs and older (50mcg/0.5mL) 12/05/2023    Moderna COVID-19 vaccine, bivalent, blue cap/gray label *Check age/dose* 12/09/2022, 05/07/2023    Moderna SARS-CoV-2 Vaccination 03/01/2021, 03/29/2021, 11/11/2021    Pneumococcal conjugate vaccine, 13-valent (PREVNAR 13) 07/13/2015    Pneumococcal conjugate vaccine, 20-valent (PREVNAR 20) 10/05/2022    Pneumococcal polysaccharide vaccine, 23-valent, age 2 years and older (PNEUMOVAX 23) 07/29/2008, 09/24/2013    Td vaccine, age 7 years and older (TDVAX) 07/29/2005    Tdap  vaccine, age 7 year and older (BOOSTRIX, ADACEL) 01/18/2016    Zoster vaccine, recombinant, adult (SHINGRIX) 02/07/2019, 08/28/2020    Zoster, live 02/29/2008     Patient's medical history was reviewed and updated either before or during this encounter.       Assessment/Plan   Problem List Items Addressed This Visit       Preop exam for internal medicine - Primary    Relevant Orders    CBC    Basic metabolic panel    Urinalysis with Reflex Culture and Microscopic     Patient's chronic medical conditions are stable and she is without complaints. METS<4.    Patient is here today for preoperative evaluation.  - Revised Cardiac Risk Index: Class I Risk (3.9% risk of intraoperative or 30-day cardiovascular event).  - Smith Perioperative Risk Index: 0.2% risk of intraoperative or 30-day cardiovascular event.  Preoperative Risk: Patient needs labwork and/or imaging ordered. Assuming no major abnormalities on orders listed above, patient is medically optimized for surgery.    Roshan Borja, APRN-CNP

## 2024-05-22 DIAGNOSIS — R82.90 ABNORMAL FINDING ON URINALYSIS: Primary | ICD-10-CM

## 2024-05-22 RX ORDER — SULFAMETHOXAZOLE AND TRIMETHOPRIM 800; 160 MG/1; MG/1
1 TABLET ORAL 2 TIMES DAILY
Qty: 10 TABLET | Refills: 0 | Status: SHIPPED | OUTPATIENT
Start: 2024-05-22 | End: 2024-05-27

## 2024-05-22 NOTE — RESULT ENCOUNTER NOTE
I did not see that patient symptomatic for UTI at OV.  Probably should start a short course abx with upcoming surgery. Rx for Bactrim Ds x5 days escribed.

## 2024-05-23 ENCOUNTER — OFFICE VISIT (OUTPATIENT)
Dept: CARDIOLOGY | Facility: CLINIC | Age: 79
End: 2024-05-23
Payer: MEDICARE

## 2024-05-23 VITALS — SYSTOLIC BLOOD PRESSURE: 130 MMHG | OXYGEN SATURATION: 99 % | DIASTOLIC BLOOD PRESSURE: 79 MMHG | HEART RATE: 75 BPM

## 2024-05-23 DIAGNOSIS — Z01.810 PREOP CARDIOVASCULAR EXAM: Primary | ICD-10-CM

## 2024-05-23 PROCEDURE — 93005 ELECTROCARDIOGRAM TRACING: CPT | Performed by: INTERNAL MEDICINE

## 2024-05-23 PROCEDURE — 1036F TOBACCO NON-USER: CPT | Performed by: INTERNAL MEDICINE

## 2024-05-23 PROCEDURE — 1159F MED LIST DOCD IN RCRD: CPT | Performed by: INTERNAL MEDICINE

## 2024-05-23 PROCEDURE — 3075F SYST BP GE 130 - 139MM HG: CPT | Performed by: INTERNAL MEDICINE

## 2024-05-23 PROCEDURE — 99213 OFFICE O/P EST LOW 20 MIN: CPT | Performed by: INTERNAL MEDICINE

## 2024-05-23 PROCEDURE — 1160F RVW MEDS BY RX/DR IN RCRD: CPT | Performed by: INTERNAL MEDICINE

## 2024-05-23 PROCEDURE — 1125F AMNT PAIN NOTED PAIN PRSNT: CPT | Performed by: INTERNAL MEDICINE

## 2024-05-23 PROCEDURE — 93010 ELECTROCARDIOGRAM REPORT: CPT | Performed by: INTERNAL MEDICINE

## 2024-05-23 PROCEDURE — 3078F DIAST BP <80 MM HG: CPT | Performed by: INTERNAL MEDICINE

## 2024-05-23 ASSESSMENT — ENCOUNTER SYMPTOMS
CONSTITUTIONAL NEGATIVE: 1
JOINT SWELLING: 1
LOSS OF SENSATION IN FEET: 0
NEUROLOGICAL NEGATIVE: 1
FEVER: 0
EYES NEGATIVE: 1
RESPIRATORY NEGATIVE: 1
CHILLS: 0
ENDOCRINE NEGATIVE: 1
GASTROINTESTINAL NEGATIVE: 1
DEPRESSION: 0
OCCASIONAL FEELINGS OF UNSTEADINESS: 0
COUGH: 0

## 2024-05-23 ASSESSMENT — PATIENT HEALTH QUESTIONNAIRE - PHQ9
1. LITTLE INTEREST OR PLEASURE IN DOING THINGS: NOT AT ALL
SUM OF ALL RESPONSES TO PHQ9 QUESTIONS 1 AND 2: 0
2. FEELING DOWN, DEPRESSED OR HOPELESS: NOT AT ALL

## 2024-05-23 ASSESSMENT — PAIN SCALES - GENERAL: PAINLEVEL: 8

## 2024-05-23 NOTE — PROGRESS NOTES
Subjective      Chief Complaint   Patient presents with    s/c knee replacement     s/c rt knee replacement 06/05/2024 Dr Mills          She has a history of atrial fibrillation on Eliquis for this.  She does have a history of hypertension and hyperlipidemia.  She does have a left bundle branch block pattern on EKG. she is to have a right total knee arthroplasty in June by Dr. Mills at the [facility] [place]  She had the left knee done last year.  She is doing well.  She is not complaining of chest discomfort.  No complaints of PND or orthopnea.  The legs are not swelling on her.  NO palpitaions. The EKG shows NSR with LBBB.  The BP is doing well.  The chol was done in  Jan.           Review of Systems   Constitutional: Negative. Negative for chills and fever.   HENT: Negative.     Eyes: Negative.    Respiratory: Negative.  Negative for cough.    Endocrine: Negative.    Skin: Negative.    Musculoskeletal:  Positive for joint pain and joint swelling.   Gastrointestinal: Negative.    Genitourinary: Negative.    Neurological: Negative.    All other systems reviewed and are negative.       Past Surgical History:   Procedure Laterality Date    CATARACT EXTRACTION Bilateral 2015    COLONOSCOPY  2008    COLONOSCOPY  2013    COLONOSCOPY  03/17/2018    COLPORRHAPHY  2021    A+P    ESOPHAGOGASTRODUODENOSCOPY  2008    FOOT SURGERY  2007    FOOT SURGERY Left 2019    [facility]    HAND SURGERY Left 2005    carpometacarpo arthroplasty    LUMBAR SPINE SURGERY  1994    TOTAL ABDOMINAL HYSTERECTOMY W/ BILATERAL SALPINGOOPHORECTOMY  1980    TOTAL KNEE ARTHROPLASTY Left     August 9, 2023    VARICOSE VEIN SURGERY  2016    Dr. Mohseni        Active Ambulatory Problems     Diagnosis Date Noted    Disorder of pancreatic duct ([facility]) 12/06/2022    Paroxysmal atrial fibrillation (Multi) 09/06/2022    Paroxysmal A-fib (Multi) 09/06/2023    Atrial paroxysmal tachycardia (CMS-HCC) 09/06/2023    Cervical spondylosis 09/06/2023     Gastroesophageal reflux disease with esophagitis 09/06/2023    Cystocele, midline 09/06/2023    Generalized osteoarthritis 01/30/2023    Hypertensive disorder 09/06/2023    Essential hypertension 09/06/2023    Prediabetes 09/06/2023    History of atrial fibrillation 09/06/2023    Hyperlipidemia 09/06/2023    Hypothyroidism 09/06/2023    Left bundle branch block (LBBB) 09/06/2023    Macrocytosis without anemia 09/06/2023    Mitral valve prolapse 09/06/2023    Primary osteoarthritis of right hip 09/06/2023    Rectocele 09/06/2023    Female proctocele without uterine prolapse 09/06/2023    Spondylosis of lumbosacral joint without myelopathy 06/13/2022    Sacroiliitis (CMS-HCC) 09/12/2022    Rhinitis 01/22/2024    Raynaud's syndrome without gangrene 06/22/2023    Intervertebral disc disorder with radiculopathy of lumbosacral region 07/29/2022    Decreased estrogen level 11/08/2022    Edema 05/25/2023    Edema of upper extremity 01/22/2024    Displacement of lumbar intervertebral disc with radiculopathy 07/29/2022    Cyanosis 05/25/2023    Pruritic disorder 01/22/2024    Blepharitis of both eyes 01/22/2016    Lateral epicondylitis of right elbow 05/11/2018    Allergic contact dermatitis due to plant 01/30/2023    Acrocyanosis (CMS-HCC) 01/22/2024    Unspecified visual disturbance 12/13/2022    Polyarthritis, unspecified 04/06/2023    Spondylosis of lumbar region without myelopathy or radiculopathy 06/13/2022    Other specified peripheral vascular diseases (CMS-HCC) 05/25/2023    Other specified soft tissue disorders 01/30/2023    Other specified diseases of blood and blood-forming organs 09/27/2022    Other long term (current) drug therapy 09/06/2022    Other infective (teno)synovitis, right hand 01/30/2023    Other fatigue 09/06/2022    Muscle weakness (generalized) 09/06/2022    Low back pain, unspecified 08/07/2023    Inflammatory polyarthropathy (Multi) 01/30/2023    Idiopathic gout, right hand 01/30/2023    Hormone  replacement therapy 01/30/2023    Abnormal levels of other serum enzymes 09/27/2022    Pneumonia due to infectious organism 01/22/2024    Stage 3a chronic kidney disease (Multi) 05/21/2024    Pulmonary venous congestion 05/21/2024    Allergy status to analgesic agent 01/30/2023    Preop cardiovascular exam 05/21/2024     Resolved Ambulatory Problems     Diagnosis Date Noted    Urinary frequency 09/06/2023    Atherosclerosis of both carotid arteries 09/06/2023    Cervical spine arthritis 09/06/2023    Elevated alkaline phosphatase level 09/06/2023    Near syncope 09/06/2023    Flexor tenosynovitis of finger 09/06/2023    Nonvenomous insect bite of multiple sites 01/22/2024    Neck pain 01/22/2024    Lower urinary tract infectious disease 01/22/2024    Epiphora due to insufficient drainage of both sides 01/28/2016    Dizziness and giddiness 12/13/2022    Cellulitis 01/30/2023    Allergic rhinitis due to pollen 01/22/2024    Allergic rhinitis due to animal hair and dander 01/22/2024    Allergic conjunctivitis 01/22/2024    Female cystocele 09/06/2023    Unspecified abdominal pain 09/06/2022    Pain in unspecified hip 01/18/2023    Nausea 09/06/2022    Contact with and (suspected) exposure to covid-19 09/06/2022    Fever 01/22/2024    Intermittent chest pain 05/21/2024    Frequency of urination 09/06/2022     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    Atrial fibrillation (Multi) 09/06/2023    Chronic reflux esophagitis 09/06/2023    Degenerative joint disease involving multiple joints 09/06/2023    Left bundle branch block 09/06/2023        Visit Vitals  /79   Pulse 75   SpO2 99%   OB Status Postmenopausal   Smoking Status Never        Objective     Constitutional:       Appearance: Healthy appearance.   Eyes:      Pupils: Pupils are equal, round, and reactive to light.   Neck:      Vascular: JVD normal.   Pulmonary:      Breath sounds: Normal breath sounds.  "  Cardiovascular:      PMI at left midclavicular line. Normal rate. Regular rhythm. Normal S1. Normal S2.       Murmurs: There is a grade 1/6 holosystolic murmur.      No gallop.  No click. No rub.   Pulses:     Intact distal pulses.   Edema:     Peripheral edema absent.   Abdominal:      Palpations: Abdomen is soft.      Tenderness: There is no abdominal tenderness.   Musculoskeletal:      Extremities: No clubbing present.Skin:     General: Skin is warm and dry.   Neurological:      General: No focal deficit present.            Lab Review:         Lab Results   Component Value Date    CHOL 130 (L) 01/24/2024    CHOL 140 07/24/2023    CHOL 165 01/10/2023     Lab Results   Component Value Date    HDL 36.0 (L) 01/24/2024    HDL 48 (L) 07/24/2023    HDL 56 01/10/2023     Lab Results   Component Value Date    LDLCALC 69 01/24/2024    LDLCALC 74 07/24/2023    LDLCALC 86 01/10/2023     Lab Results   Component Value Date    TRIG 126 01/24/2024    TRIG 91 07/24/2023    TRIG 113 01/10/2023     No components found for: \"CHOLHDL\"     Assessment/Plan     Preop cardiovascular exam  She is to have the right knee replaced in June.  No angina and no chf. The EKG is controlled.  Feel she is low risk for the surgery cardiac wise and will clear.  She needs to stop the eliquis 3 days before     "

## 2024-05-23 NOTE — ASSESSMENT & PLAN NOTE
She is to have the right knee replaced in June.  No angina and no chf. The EKG is controlled.  Feel she is low risk for the surgery cardiac wise and will clear.

## 2024-05-24 LAB — BACTERIA UR CULT: ABNORMAL

## 2024-05-28 PROBLEM — N39.0 URINARY TRACT INFECTION WITH HEMATURIA: Status: ACTIVE | Noted: 2024-01-22

## 2024-05-28 PROBLEM — R31.9 URINARY TRACT INFECTION WITH HEMATURIA: Status: ACTIVE | Noted: 2024-01-22

## 2024-05-28 RX ORDER — NITROFURANTOIN 25; 75 MG/1; MG/1
100 CAPSULE ORAL 2 TIMES DAILY
Qty: 10 CAPSULE | Refills: 0 | Status: SHIPPED | OUTPATIENT
Start: 2024-05-28 | End: 2024-06-02

## 2024-05-28 NOTE — RESULT ENCOUNTER NOTE
Called Pt explained that the previous ATB she took does not cover the ecoli growing in her urine and she will need an alternate ATB. Limited susceptibility report options, Pt shares she has not had the medication for years and only suffered from a mild rash. We discussed signs to monitor for and when to seek emergency care

## 2024-05-31 ENCOUNTER — TELEPHONE (OUTPATIENT)
Dept: PRIMARY CARE | Facility: CLINIC | Age: 79
End: 2024-05-31
Payer: MEDICARE

## 2024-05-31 NOTE — PROGRESS NOTES
Pt's lab work and urine sample returned. She has a noted UTI infection and is being treated with Macrobid 100mg BID x 5 days through 6/2/24. She is medically optimized for surgery.

## 2024-05-31 NOTE — TELEPHONE ENCOUNTER
"Pt having surgery next week.  The surgical clearance note on 5/21/24 states: \"Assuming no major abnormalities on orders listed above, patient is medically optimized for surgery.\"  Dr. Mills' office requesting you review testing and addend note to indicate \"pt is medically optimized for surgery. \" Fax addended note to [phone]    Please advise.  "

## 2024-06-06 ENCOUNTER — DOCUMENTATION (OUTPATIENT)
Dept: HOME HEALTH SERVICES | Facility: HOME HEALTH | Age: 79
End: 2024-06-06
Payer: MEDICARE

## 2024-06-06 ENCOUNTER — HOME HEALTH ADMISSION (OUTPATIENT)
Dept: HOME HEALTH SERVICES | Facility: HOME HEALTH | Age: 79
End: 2024-06-06
Payer: MEDICARE

## 2024-06-06 NOTE — HH CARE COORDINATION
Home Care received a Referral for Physical Therapy. We have processed the referral for a Start of Care on 24 HOURS .     If you have any questions or concerns, please feel free to contact us at [phone]. Follow the prompts, enter your five digit zip code, and you will be directed to your care team on [facility].

## 2024-06-08 ENCOUNTER — HOME CARE VISIT (OUTPATIENT)
Dept: HOME HEALTH SERVICES | Facility: HOME HEALTH | Age: 79
End: 2024-06-08
Payer: MEDICARE

## 2024-06-08 VITALS
RESPIRATION RATE: 16 BRPM | SYSTOLIC BLOOD PRESSURE: 116 MMHG | TEMPERATURE: 98 F | DIASTOLIC BLOOD PRESSURE: 62 MMHG | HEART RATE: 62 BPM

## 2024-06-08 PROCEDURE — 1090000002 HH PPS REVENUE DEBIT

## 2024-06-08 PROCEDURE — G0151 HHCP-SERV OF PT,EA 15 MIN: HCPCS | Mod: HHH

## 2024-06-08 PROCEDURE — 0023 HH SOC

## 2024-06-08 PROCEDURE — 169592 NO-PAY CLAIM PROCEDURE

## 2024-06-08 PROCEDURE — 1090000001 HH PPS REVENUE CREDIT

## 2024-06-08 ASSESSMENT — ENCOUNTER SYMPTOMS
PAIN LOCATION - PAIN SEVERITY: 6/10
PAIN: 1
PAIN LOCATION - RELIEVING FACTORS: MEDICATION, ICE
SUBJECTIVE PAIN PROGRESSION: WAXING AND WANING
PAIN LOCATION: RIGHT KNEE
LOWEST PAIN SEVERITY IN PAST 24 HOURS: 6/10
PERSON REPORTING PAIN: PATIENT
HIGHEST PAIN SEVERITY IN PAST 24 HOURS: 10/10

## 2024-06-09 PROCEDURE — 1090000002 HH PPS REVENUE DEBIT

## 2024-06-09 PROCEDURE — 1090000001 HH PPS REVENUE CREDIT

## 2024-06-09 SDOH — HEALTH STABILITY: PHYSICAL HEALTH: EXERCISE COMMENTS: Y IN ORDER TO MAXIMIZE ROM AND STRENGTH FOR IMPROVED FUNCTIONAL PERFORMANCE AND DECREASED PAIN.

## 2024-06-09 SDOH — HEALTH STABILITY: PHYSICAL HEALTH
EXERCISE COMMENTS: PT INSTRUCTED PATIENT IN EXERCISES PER TKA PROTOCOL 1X10:  - ANKLE PUMPS  - GLUT SETS  - QUAD SETS  - HEELSLIDES  - HIP ABDUCTION  - SAQ  - LAQ  - SLR  - SEATED KNEE FLEXION STRETCH    PT EDUCATED PATIENT ON IMPORTANCE OF PERFORMING EXERCISES 3X DAIL

## 2024-06-09 ASSESSMENT — ACTIVITIES OF DAILY LIVING (ADL)
OASIS_M1830: 05
ENTERING_EXITING_HOME: MODERATE ASSIST
AMBULATION ASSISTANCE ON FLAT SURFACES: 1

## 2024-06-10 ENCOUNTER — HOME CARE VISIT (OUTPATIENT)
Dept: HOME HEALTH SERVICES | Facility: HOME HEALTH | Age: 79
End: 2024-06-10
Payer: MEDICARE

## 2024-06-10 VITALS — TEMPERATURE: 98.8 F | RESPIRATION RATE: 16 BRPM | DIASTOLIC BLOOD PRESSURE: 55 MMHG | SYSTOLIC BLOOD PRESSURE: 116 MMHG

## 2024-06-10 PROCEDURE — 1090000001 HH PPS REVENUE CREDIT

## 2024-06-10 PROCEDURE — 1090000002 HH PPS REVENUE DEBIT

## 2024-06-10 PROCEDURE — G0157 HHC PT ASSISTANT EA 15: HCPCS | Mod: HHH

## 2024-06-10 SDOH — HEALTH STABILITY: PHYSICAL HEALTH: EXERCISE ACTIVITIES SETS: 1

## 2024-06-10 SDOH — HEALTH STABILITY: PHYSICAL HEALTH: PHYSICAL EXERCISE: SUPINE

## 2024-06-10 SDOH — HEALTH STABILITY: PHYSICAL HEALTH: PHYSICAL EXERCISE: SEATED

## 2024-06-10 SDOH — HEALTH STABILITY: PHYSICAL HEALTH: EXERCISE ACTIVITY: KNEE FLEX, LAQS

## 2024-06-10 SDOH — HEALTH STABILITY: PHYSICAL HEALTH: PHYSICAL EXERCISE: 10

## 2024-06-10 SDOH — HEALTH STABILITY: PHYSICAL HEALTH: EXERCISE COMMENTS: V CUES ON CORRECT FORM OF THER EXS AND HOLDS TO INCREASE M CONTRACTION

## 2024-06-10 SDOH — HEALTH STABILITY: PHYSICAL HEALTH: EXERCISE TYPE: RLE TKE EXS

## 2024-06-10 SDOH — HEALTH STABILITY: PHYSICAL HEALTH: EXERCISE ACTIVITY: APS, QS, GS HIP ABD, HS, TKES, HAMSTRING STRETCH WITH TOWEL ROLL

## 2024-06-10 ASSESSMENT — ACTIVITIES OF DAILY LIVING (ADL)
AMBULATION ASSISTANCE: 1
AMBULATION ASSISTANCE: SUPERVISION
AMBULATION ASSISTANCE ON FLAT SURFACES: 1
CURRENT_FUNCTION: SUPERVISION
AMBULATION ASSISTANCE: STAND BY ASSIST
AMBULATION_DISTANCE/DURATION_TOLERATED: 50'X2
PHYSICAL TRANSFERS ASSESSED: 1

## 2024-06-10 ASSESSMENT — ENCOUNTER SYMPTOMS
PAIN LOCATION: RIGHT KNEE
HIGHEST PAIN SEVERITY IN PAST 24 HOURS: 10/10
PAIN SEVERITY GOAL: 0/10
PAIN LOCATION - PAIN SEVERITY: 4/10
PAIN LOCATION - RELIEVING FACTORS: REST, CP, PAIN MEDS
PAIN LOCATION - PAIN QUALITY: ACHING
PAIN LOCATION - PAIN FREQUENCY: CONSTANT
LOWEST PAIN SEVERITY IN PAST 24 HOURS: 4/10
SUBJECTIVE PAIN PROGRESSION: GRADUALLY IMPROVING

## 2024-06-11 PROCEDURE — 1090000001 HH PPS REVENUE CREDIT

## 2024-06-11 PROCEDURE — 1090000002 HH PPS REVENUE DEBIT

## 2024-06-12 PROCEDURE — 1090000001 HH PPS REVENUE CREDIT

## 2024-06-12 PROCEDURE — 1090000002 HH PPS REVENUE DEBIT

## 2024-06-13 ENCOUNTER — TELEPHONE (OUTPATIENT)
Dept: PRIMARY CARE | Facility: CLINIC | Age: 79
End: 2024-06-13
Payer: MEDICARE

## 2024-06-13 ENCOUNTER — HOSPITAL ENCOUNTER (EMERGENCY)
Facility: HOSPITAL | Age: 79
Discharge: HOME | End: 2024-06-13
Payer: MEDICARE

## 2024-06-13 ENCOUNTER — HOME CARE VISIT (OUTPATIENT)
Dept: HOME HEALTH SERVICES | Facility: HOME HEALTH | Age: 79
End: 2024-06-13

## 2024-06-13 ENCOUNTER — APPOINTMENT (OUTPATIENT)
Dept: RADIOLOGY | Facility: HOSPITAL | Age: 79
End: 2024-06-13
Payer: MEDICARE

## 2024-06-13 VITALS
SYSTOLIC BLOOD PRESSURE: 129 MMHG | BODY MASS INDEX: 21.66 KG/M2 | RESPIRATION RATE: 18 BRPM | HEIGHT: 67 IN | TEMPERATURE: 97.9 F | HEART RATE: 69 BPM | OXYGEN SATURATION: 97 % | DIASTOLIC BLOOD PRESSURE: 59 MMHG | WEIGHT: 138 LBS

## 2024-06-13 DIAGNOSIS — K59.03 DRUG-INDUCED CONSTIPATION: Primary | ICD-10-CM

## 2024-06-13 PROCEDURE — 99283 EMERGENCY DEPT VISIT LOW MDM: CPT

## 2024-06-13 PROCEDURE — 74018 RADEX ABDOMEN 1 VIEW: CPT

## 2024-06-13 PROCEDURE — 1090000002 HH PPS REVENUE DEBIT

## 2024-06-13 PROCEDURE — 74018 RADEX ABDOMEN 1 VIEW: CPT | Performed by: RADIOLOGY

## 2024-06-13 PROCEDURE — 1090000001 HH PPS REVENUE CREDIT

## 2024-06-13 RX ORDER — SYRING-NEEDL,DISP,INSUL,0.3 ML 29 G X1/2"
296 SYRINGE, EMPTY DISPOSABLE MISCELLANEOUS ONCE
Qty: 296 ML | Refills: 0 | Status: SHIPPED | OUTPATIENT
Start: 2024-06-13 | End: 2024-06-13

## 2024-06-13 ASSESSMENT — PAIN - FUNCTIONAL ASSESSMENT: PAIN_FUNCTIONAL_ASSESSMENT: 0-10

## 2024-06-13 NOTE — ED PROVIDER NOTES
HPI   Chief Complaint   Patient presents with    Constipation     Pt had a recent surgery on her right knee.  Has been taking narcotics.  Has been having constipation and abd pain.       Patient is an 79-year-old female presenting with constipation.  She recently had right knee surgery and has been taking oxycodone twice daily since then.  Endorses that she has not had a bowel movement since Wednesday of last week.  Endorsing cramping abdominal pain with the occasional bout of nausea.  States she has not had anything to eat today but states that her appetite is still good.  She says for the last 3 nights, she has taken MiraLAX.  She took 4 Senokot today.  Endorses that she chronically takes MiraLAX and that typically does help with her bowel movements.  Patient denies fevers, chills, cough, sore throat, runny nose, chest pain, shortness of breath, vomiting, diarrhea or urinary complaints.                          Arthur Coma Scale Score: 15                     Patient History   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    Allergic conjunctivitis 01/22/2024    Atherosclerosis of both carotid arteries 09/06/2023    Atrial fibrillation (Multi) 09/06/2023    Cellulitis 01/30/2023    Cervical spine arthritis 09/06/2023    Cervical spondylosis 09/06/2023    Chronic reflux esophagitis 09/06/2023    Contact with and (suspected) exposure to covid-19 09/06/2022    Degenerative joint disease involving multiple joints 09/06/2023    Dizziness and giddiness 12/13/2022    Epiphora due to insufficient drainage of both sides 01/28/2016    Essential hypertension 09/06/2023    Female cystocele 09/06/2023    Flexor tenosynovitis of finger 09/06/2023    Frequency of urination 09/06/2022    Hyperlipidemia 09/06/2023    Hypothyroidism 09/06/2023    Intermittent chest pain 05/21/2024    Left bundle branch block 09/06/2023    Lower urinary tract infectious disease 01/22/2024    Macrocytosis without  anemia 09/06/2023    Mitral valve prolapse 09/06/2023    Prediabetes 09/06/2023    Primary osteoarthritis of right hip 09/06/2023    Unspecified abdominal pain 09/06/2022     Past Surgical History:   Procedure Laterality Date    CATARACT EXTRACTION Bilateral 2015    COLONOSCOPY  2008    COLONOSCOPY  2013    COLONOSCOPY  03/17/2018    COLPORRHAPHY  2021    A+P    ESOPHAGOGASTRODUODENOSCOPY  2008    FOOT SURGERY  2007    FOOT SURGERY Left 2019    [facility]    HAND SURGERY Left 2005    carpometacarpo arthroplasty    LUMBAR SPINE SURGERY  1994    TOTAL ABDOMINAL HYSTERECTOMY W/ BILATERAL SALPINGOOPHORECTOMY  1980    TOTAL KNEE ARTHROPLASTY Left     August 9, 2023    TOTAL KNEE ARTHROPLASTY Right 06/05/2024    Dr. Brendan Mills    VARICOSE VEIN SURGERY  2016    Dr. Mohseni     Family History   Problem Relation Name Age of Onset    Colon cancer Mother      COPD Father Champ     Lung disease Father Champ     Other (Calcified heart valves) Sister      Colon cancer Brother       Social History     Tobacco Use    Smoking status: Never    Smokeless tobacco: Never   Vaping Use    Vaping status: Never Used   Substance Use Topics    Alcohol use: Never    Drug use: Never       Physical Exam   ED Triage Vitals [06/13/24 1307]   Temperature Heart Rate Respirations BP   36.6 °C (97.9 °F) 69 18 129/59      Pulse Ox Temp src Heart Rate Source Patient Position   97 % -- -- --      BP Location FiO2 (%)     -- --       Physical Exam  Vitals and nursing note reviewed.   Constitutional:       General: She is not in acute distress.     Appearance: She is well-developed.      Comments: Awake, laying in examination bed   HENT:      Head: Normocephalic and atraumatic.      Nose: Nose normal.      Mouth/Throat:      Mouth: Mucous membranes are moist.      Pharynx: Oropharynx is clear.   Eyes:      Extraocular Movements: Extraocular movements intact.      Conjunctiva/sclera: Conjunctivae normal.      Pupils: Pupils are equal, round, and reactive to  light.   Cardiovascular:      Rate and Rhythm: Normal rate and regular rhythm.      Heart sounds: No murmur heard.  Pulmonary:      Effort: Pulmonary effort is normal. No respiratory distress.      Breath sounds: Normal breath sounds.   Abdominal:      Palpations: Abdomen is soft.      Tenderness: There is abdominal tenderness in the right lower quadrant, suprapubic area and left lower quadrant.      Comments: Tenderness present across the lower abdomen.   Musculoskeletal:         General: No swelling. Normal range of motion.      Cervical back: Neck supple.   Skin:     General: Skin is warm and dry.      Capillary Refill: Capillary refill takes less than 2 seconds.   Neurological:      General: No focal deficit present.      Mental Status: She is alert and oriented to person, place, and time.   Psychiatric:         Mood and Affect: Mood normal.         Behavior: Behavior normal.         ED Course & MDM   Diagnoses as of 06/13/24 2011   Drug-induced constipation       Medical Decision Making  Patient is an 79-year-old female presenting with constipation.  KUB ordered.  I did have a discussion with this patient about enema versus outpatient treatment.  She states that she would like to try outpatient treatment.  She does not feel comfortable with an enema.  Conditions considered include but not limited: Drug-induced constipation, bowel obstruction.    He does show a large amount of stool burden without obstruction.  I believe this patient is at low risk for complication, and a disoposition of discharge is acceptable.  Return to the Emergency Department if new or worsening symptoms including headache, fever, chills, chest pain, shortness of breath, syncope, near syncope, abdominal pain, nausea, vomiting,  diarrhea, or worsening pain.  I again have discussed with the patient about management for her constipation.  We did discuss attempting an enema versus outpatient management.  She states she would rather try  outpatient management.  I did discuss with her that prescription magnesium citrate will be sent to her pharmacy.  Patient is agreeable to disposition of discharge with follow-up with primary care as well as her surgeon whom she has an appointment with tomorrow..    Portions of this note made with Dragon software, please be mindful of potential grammatical errors.        XR abdomen 1 view   Final Result   Large amount of stool within the left hemicolon suggesting   constipation. Incidental note is made of lumbar spondylosis.        MACRO:   none        Signed by: Leonides Polo 6/13/2024 2:30 PM   Dictation workstation:   [id number]            Procedure  Procedures     Ovi Belle PA-C  06/13/24 2011

## 2024-06-13 NOTE — TELEPHONE ENCOUNTER
Pt c./o constipation after knee surgery 1 wk ago. States that she has taken full dose of miralax x3 nights and Senakot x2 days and has had no relief. Has not had a bowel movement since 6/3. Pt reporting 9/10 abdominal pain. Advised to be seen in ED. Pt verbalized understanding.

## 2024-06-14 PROCEDURE — 1090000001 HH PPS REVENUE CREDIT

## 2024-06-14 PROCEDURE — 1090000002 HH PPS REVENUE DEBIT

## 2024-06-15 ENCOUNTER — HOME CARE VISIT (OUTPATIENT)
Dept: HOME HEALTH SERVICES | Facility: HOME HEALTH | Age: 79
End: 2024-06-15
Payer: MEDICARE

## 2024-06-15 PROCEDURE — 1090000002 HH PPS REVENUE DEBIT

## 2024-06-15 PROCEDURE — 1090000001 HH PPS REVENUE CREDIT

## 2024-06-17 ENCOUNTER — HOME CARE VISIT (OUTPATIENT)
Dept: HOME HEALTH SERVICES | Facility: HOME HEALTH | Age: 79
End: 2024-06-17
Payer: MEDICARE

## 2024-06-17 VITALS
SYSTOLIC BLOOD PRESSURE: 148 MMHG | HEART RATE: 70 BPM | TEMPERATURE: 98.4 F | RESPIRATION RATE: 16 BRPM | DIASTOLIC BLOOD PRESSURE: 78 MMHG

## 2024-06-17 PROCEDURE — G0151 HHCP-SERV OF PT,EA 15 MIN: HCPCS | Mod: HHH

## 2024-06-17 SDOH — HEALTH STABILITY: PHYSICAL HEALTH: EXERCISE COMMENTS: ER IN ORDER TO PERFORM SAQ, HEELSLIDE AND SLR.  PATIENT WAS ABLE TO PERFORM ACTIVELY TODAY.

## 2024-06-17 SDOH — HEALTH STABILITY: PHYSICAL HEALTH
EXERCISE COMMENTS: PT INSTRUCTED PATIENT IN EXERCISES PER TKA PROTOCOL 1X10:  - ANKLE PUMPS  - GLUT SETS  - QUAD SETS  - HEELSLIDES  - HIP ABDUCTION  - SAQ  - LAQ  - SLR  - SEATED KNEE FLEXION STRETCH WITH PASSIVE OVER PRESSURE X5    PATIETN HAD BEEN USING HER LEG LIFT

## 2024-06-17 ASSESSMENT — ENCOUNTER SYMPTOMS
PAIN: 1
PERSON REPORTING PAIN: PATIENT

## 2024-06-20 ENCOUNTER — HOME CARE VISIT (OUTPATIENT)
Dept: HOME HEALTH SERVICES | Facility: HOME HEALTH | Age: 79
End: 2024-06-20
Payer: MEDICARE

## 2024-06-20 PROCEDURE — G0151 HHCP-SERV OF PT,EA 15 MIN: HCPCS | Mod: HHH

## 2024-06-20 ASSESSMENT — ENCOUNTER SYMPTOMS
LOWEST PAIN SEVERITY IN PAST 24 HOURS: 0/10
PERSON REPORTING PAIN: PATIENT
HIGHEST PAIN SEVERITY IN PAST 24 HOURS: 8/10
PAIN: 1
SUBJECTIVE PAIN PROGRESSION: GRADUALLY WORSENING

## 2024-06-20 ASSESSMENT — ACTIVITIES OF DAILY LIVING (ADL)
HOME_HEALTH_OASIS: 00
OASIS_M1830: 01
AMBULATION ASSISTANCE ON FLAT SURFACES: 1

## 2024-06-27 ENCOUNTER — APPOINTMENT (OUTPATIENT)
Dept: PHYSICAL THERAPY | Facility: CLINIC | Age: 79
End: 2024-06-27
Payer: MEDICARE

## 2024-06-28 ENCOUNTER — APPOINTMENT (OUTPATIENT)
Dept: RADIOLOGY | Facility: CLINIC | Age: 79
End: 2024-06-28
Payer: MEDICARE

## 2024-07-01 ENCOUNTER — TELEPHONE (OUTPATIENT)
Dept: SURGICAL ONCOLOGY | Facility: CLINIC | Age: 79
End: 2024-07-01

## 2024-07-01 NOTE — TELEPHONE ENCOUNTER
Returned Ms. Hatch's call. She canceled her surveillance MRI as she recently had hip surgery and cannot comfortably lie flat for 45+ minutes. I will check in with her in late July/early August to see if we can reschedule around that time.     GILDA BrambilaC  Surgical Oncology

## 2024-07-03 ENCOUNTER — APPOINTMENT (OUTPATIENT)
Dept: PHYSICAL THERAPY | Facility: CLINIC | Age: 79
End: 2024-07-03
Payer: MEDICARE

## 2024-07-10 ENCOUNTER — APPOINTMENT (OUTPATIENT)
Dept: PHYSICAL THERAPY | Facility: CLINIC | Age: 79
End: 2024-07-10
Payer: MEDICARE

## 2024-07-12 ENCOUNTER — APPOINTMENT (OUTPATIENT)
Dept: PHYSICAL THERAPY | Facility: CLINIC | Age: 79
End: 2024-07-12
Payer: MEDICARE

## 2024-07-17 ENCOUNTER — APPOINTMENT (OUTPATIENT)
Dept: PHYSICAL THERAPY | Facility: CLINIC | Age: 79
End: 2024-07-17
Payer: MEDICARE

## 2024-07-19 ENCOUNTER — APPOINTMENT (OUTPATIENT)
Dept: PHYSICAL THERAPY | Facility: CLINIC | Age: 79
End: 2024-07-19
Payer: MEDICARE

## 2024-07-22 ENCOUNTER — APPOINTMENT (OUTPATIENT)
Dept: PHYSICAL THERAPY | Facility: CLINIC | Age: 79
End: 2024-07-22
Payer: MEDICARE

## 2024-07-24 ENCOUNTER — APPOINTMENT (OUTPATIENT)
Dept: PHYSICAL THERAPY | Facility: CLINIC | Age: 79
End: 2024-07-24
Payer: MEDICARE

## 2024-07-24 ENCOUNTER — OFFICE VISIT (OUTPATIENT)
Dept: ALLERGY | Facility: CLINIC | Age: 79
End: 2024-07-24
Payer: MEDICARE

## 2024-07-24 DIAGNOSIS — H10.45 CHRONIC ALLERGIC CONJUNCTIVITIS: ICD-10-CM

## 2024-07-24 DIAGNOSIS — J30.89 ALLERGIC RHINITIS DUE TO OTHER ALLERGIC TRIGGER, UNSPECIFIED SEASONALITY: Primary | ICD-10-CM

## 2024-07-24 DIAGNOSIS — J30.1 ALLERGIC RHINITIS DUE TO POLLEN, UNSPECIFIED SEASONALITY: ICD-10-CM

## 2024-07-24 PROCEDURE — 99214 OFFICE O/P EST MOD 30 MIN: CPT | Performed by: ALLERGY & IMMUNOLOGY

## 2024-07-24 RX ORDER — IPRATROPIUM BROMIDE 42 UG/1
2 SPRAY, METERED NASAL 3 TIMES DAILY
Qty: 15 ML | Refills: 11 | Status: SHIPPED | OUTPATIENT
Start: 2024-07-24

## 2024-07-24 RX ORDER — MINERAL OIL
180 ENEMA (ML) RECTAL DAILY PRN
Qty: 30 TABLET | Refills: 11 | Status: SHIPPED | OUTPATIENT
Start: 2024-07-24

## 2024-07-24 NOTE — PROGRESS NOTES
Subjective   Patient ID:   [id number]   Nicolette Hatch is a 79 y.o. female who presents for No chief complaint on file..    No chief complaint on file.         HPI  This patient is here to evaluate for:       · Start: Fexofenadine HCl - 180 MG Oral Tablet; TAKE 1 TABLET PO DAILY AS NEEDED  FOR ITCHING  Rhinitis, chronic    · Start: Ipratropium Bromide 0.06 % Nasal Solution; USE 1-2 SPRAYS IN EACH NOSTRIL  2 TO 3 TIMES DAILY     Only needs fexofenadine in spring and sometimes in the fall    No sinus infections.     Changes in health: a.fibrillation x 2 years.     previously reported:  When gets up in the AM, only the left nostril runs. Started for years  She did get allergy shots for years with Dr. Quinonez. She did well off the shots initially.      Also skin and scalp are pruritic. no rash     Wears hearing aids and the left is harder to put in but is able     sh: has a cat in the home     Review of Systems   All other systems reviewed and are negative.        Objective     There were no vitals taken for this visit.     Physical Exam  Constitutional:       General: She is not in acute distress.     Appearance: Normal appearance. She is not ill-appearing.   HENT:      Head: Normocephalic and atraumatic.      Right Ear: Tympanic membrane, ear canal and external ear normal.      Left Ear: Tympanic membrane, ear canal and external ear normal.      Nose: Nose normal. No congestion or rhinorrhea.      Mouth/Throat:      Mouth: Mucous membranes are moist.      Pharynx: Oropharynx is clear. No oropharyngeal exudate or posterior oropharyngeal erythema.   Eyes:      General:         Right eye: No discharge.         Left eye: No discharge.      Conjunctiva/sclera: Conjunctivae normal.   Cardiovascular:      Rate and Rhythm: Normal rate and regular rhythm.      Heart sounds: Normal heart sounds. No murmur heard.     No friction rub. No gallop.   Pulmonary:      Effort: Pulmonary effort is normal. No respiratory distress.       Breath sounds: Normal breath sounds. No stridor. No wheezing, rhonchi or rales.   Chest:      Chest wall: No tenderness.   Abdominal:      General: Abdomen is flat.      Palpations: Abdomen is soft.   Musculoskeletal:         General: Normal range of motion.      Cervical back: Normal range of motion and neck supple.   Lymphadenopathy:      Cervical: No cervical adenopathy.   Skin:     General: Skin is warm and dry.      Findings: No erythema, lesion or rash.   Neurological:      General: No focal deficit present.      Mental Status: She is alert. Mental status is at baseline.   Psychiatric:         Mood and Affect: Mood normal.         Behavior: Behavior normal.         Thought Content: Thought content normal.         Judgment: Judgment normal.            Current Outpatient Medications   Medication Sig Dispense Refill    acetaminophen (Tylenol) 325 mg tablet Take 1 tablet (325 mg) by mouth if needed for mild pain (1 - 3).      amiodarone (Pacerone) 100 mg tablet TAKE 1 TABLET BY MOUTH ONCE DAILY 90 tablet 4    apixaban (Eliquis) 2.5 mg tablet Take 2 tablets by mouth 2 times a day.      atorvastatin (Lipitor) 40 mg tablet Take 1 tablet by mouth once daily.      carisoprodol (Soma) 350 mg tablet Take 350 mg by mouth 3 times a day as needed for muscle spasms. Indications: muscle spasm      cholecalciferol (Vitamin D-3) 25 MCG (1000 UT) capsule Take 1 capsule by mouth once daily.      cyanocobalamin (Vitamin B-12) 1,000 mcg tablet Take 1 tablet (1,000 mcg) by mouth once daily.      fexofenadine (Allegra) 180 mg tablet Take 1 tablet by mouth once daily as needed (allergic rhinitis).      furosemide (Lasix) 40 mg tablet Take 1 tablet (40 mg) by mouth once daily. (Patient not taking: Reported on 5/21/2024) 3 tablet 11    ipratropium (Atrovent) 42 mcg (0.06 %) nasal spray Administer 2 sprays into each nostril 3 times a day.      Lactobacillus acidophilus (ACIDOPHILUS ORAL) Take 120 mg by mouth once daily. PROBIOTIC       levothyroxine (Synthroid, Levoxyl) 112 mcg tablet Take 1 tablet (112 mcg) by mouth once daily. 90 tablet 3    losartan (Cozaar) 25 mg tablet take 1 tablet Orally Once a day. 90 tablet 3    MAGNESIUM ORAL Magnesium 500 MG  1 tablet with a meal Orally Once a day      oxyCODONE-acetaminophen (Percocet) 5-325 mg tablet Take 1 tablet by mouth every 4 hours. Indications: pain, severe       No current facility-administered medications for this visit.       Summary of the labs over the past 6 months:    Lab on 05/21/2024   Component Date Value Ref Range Status    WBC 05/21/2024 7.1  4.4 - 11.3 x10*3/uL Final    nRBC 05/21/2024 0.0  0.0 - 0.0 /100 WBCs Final    RBC 05/21/2024 4.12  4.00 - 5.20 x10*6/uL Final    Hemoglobin 05/21/2024 13.4  12.0 - 16.0 g/dL Final    Hematocrit 05/21/2024 42.3  36.0 - 46.0 % Final    MCV 05/21/2024 103 (H)  80 - 100 fL Final    MCH 05/21/2024 32.5  26.0 - 34.0 pg Final    MCHC 05/21/2024 31.7 (L)  32.0 - 36.0 g/dL Final    RDW 05/21/2024 13.5  11.5 - 14.5 % Final    Platelets 05/21/2024 301  150 - 450 x10*3/uL Final    Glucose 05/21/2024 93  65 - 99 mg/dL Final    Sodium 05/21/2024 140  133 - 145 mmol/L Final    Potassium 05/21/2024 4.6  3.4 - 5.1 mmol/L Final    Chloride 05/21/2024 104  97 - 107 mmol/L Final    Bicarbonate 05/21/2024 26  24 - 31 mmol/L Final    Urea Nitrogen 05/21/2024 24  8 - 25 mg/dL Final    Creatinine 05/21/2024 0.90  0.40 - 1.60 mg/dL Final    eGFR 05/21/2024 64  >60 mL/min/1.73m*2 Final    Calcium 05/21/2024 9.2  8.5 - 10.4 mg/dL Final    Anion Gap 05/21/2024 10  <=19 mmol/L Final    Color, Urine 05/21/2024 Light-Yellow  Light-Yellow, Yellow, Dark-Yellow Final    Appearance, Urine 05/21/2024 Clear  Clear Final    Specific Gravity, Urine 05/21/2024 1.021  1.005 - 1.035 Final    pH, Urine 05/21/2024 6.0  5.0, 5.5, 6.0, 6.5, 7.0, 7.5, 8.0 Final    Protein, Urine 05/21/2024 NEGATIVE  NEGATIVE, 10 (TRACE), 20 (TRACE) mg/dL Final    Glucose, Urine 05/21/2024 Normal  Normal  mg/dL Final    Blood, Urine 05/21/2024 0.03 (TRACE) (A)  NEGATIVE Final    Ketones, Urine 05/21/2024 NEGATIVE  NEGATIVE mg/dL Final    Bilirubin, Urine 05/21/2024 NEGATIVE  NEGATIVE Final    Urobilinogen, Urine 05/21/2024 Normal  Normal mg/dL Final    Nitrite, Urine 05/21/2024 2+ (A)  NEGATIVE Final    Leukocyte Esterase, Urine 05/21/2024 NEGATIVE  NEGATIVE Final    Extra Tube 05/21/2024 Hold for add-ons.   Final    WBC, Urine 05/21/2024 1-5  1-5, NONE /HPF Final    RBC, Urine 05/21/2024 11-20 (A)  NONE, 1-2, 3-5 /HPF Final    Bacteria, Urine 05/21/2024 1+ (A)  NONE SEEN /HPF Final    Mucus, Urine 05/21/2024 FEW  Reference range not established. /LPF Final    Urine Culture 05/21/2024 >100,000 Escherichia coli (A)   Final   Lab on 05/08/2024   Component Date Value Ref Range Status    C-Reactive Protein 05/08/2024 2.00  0.00 - 2.00 mg/dL Final    Sedimentation Rate 05/08/2024 30  0 - 30 mm/h Final    WBC 05/08/2024 9.4  4.4 - 11.3 x10*3/uL Final    nRBC 05/08/2024 0.0  0.0 - 0.0 /100 WBCs Final    RBC 05/08/2024 4.10  4.00 - 5.20 x10*6/uL Final    Hemoglobin 05/08/2024 13.3  12.0 - 16.0 g/dL Final    Hematocrit 05/08/2024 41.9  36.0 - 46.0 % Final    MCV 05/08/2024 102 (H)  80 - 100 fL Final    MCH 05/08/2024 32.4  26.0 - 34.0 pg Final    MCHC 05/08/2024 31.7 (L)  32.0 - 36.0 g/dL Final    RDW 05/08/2024 13.4  11.5 - 14.5 % Final    Platelets 05/08/2024 247  150 - 450 x10*3/uL Final   Hospital Outpatient Visit on 02/16/2024   Component Date Value Ref Range Status    AV pk esperanza 02/16/2024 1.82  m/s Final    LVOT diam 02/16/2024 1.90  cm Final    AV mn grad 02/16/2024 7.0  mmHg Final    MV E/A ratio 02/16/2024 0.62   Final    Tricuspid annular plane systolic e* 02/16/2024 2.4  cm Final    LV EF 02/16/2024 49  % Final    LA vol index A/L 02/16/2024 24.0  ml/m2 Final    MV avg E/e' ratio 02/16/2024 23.33   Final    RV free wall pk S' 02/16/2024 8.92  cm/s Final    RVSP 02/16/2024 25.8  mmHg Final    LVIDd 02/16/2024 4.19   cm Final    AV pk grad 02/16/2024 13.2  mmHg Final    Aortic Valve Area by Continuity of* 02/16/2024 1.27  cm2 Final    Aortic Valve Area by Continuity of* 02/16/2024 1.29  cm2 Final    LV A4C EF 02/16/2024 50.1   Final   Lab on 01/31/2024   Component Date Value Ref Range Status    Urine Culture 01/31/2024 >100,000 Escherichia coli (A)   Final    Color, Urine 01/31/2024 Light-Yellow  Light-Yellow, Yellow, Dark-Yellow Final    Appearance, Urine 01/31/2024 Clear  Clear Final    Specific Gravity, Urine 01/31/2024 1.024  1.005 - 1.035 Final    pH, Urine 01/31/2024 5.5  5.0, 5.5, 6.0, 6.5, 7.0, 7.5, 8.0 Final    Protein, Urine 01/31/2024 NEGATIVE  NEGATIVE, 10 (TRACE), 20 (TRACE) mg/dL Final    Glucose, Urine 01/31/2024 Normal  Normal mg/dL Final    Blood, Urine 01/31/2024 NEGATIVE  NEGATIVE Final    Ketones, Urine 01/31/2024 NEGATIVE  NEGATIVE mg/dL Final    Bilirubin, Urine 01/31/2024 NEGATIVE  NEGATIVE Final    Urobilinogen, Urine 01/31/2024 Normal  Normal mg/dL Final    Nitrite, Urine 01/31/2024 NEGATIVE  NEGATIVE Final    Leukocyte Esterase, Urine 01/31/2024 NEGATIVE  NEGATIVE Final         Assessment/Plan   Diagnoses and all orders for this visit:  Allergic rhinitis due to other allergic trigger, unspecified seasonality  -     ipratropium (Atrovent) 42 mcg (0.06 %) nasal spray; Administer 2 sprays into each nostril 3 times a day.  -     fexofenadine (Allegra) 180 mg tablet; Take 1 tablet (180 mg) by mouth once daily as needed (allergic rhinitis).  Allergic rhinitis due to pollen, unspecified seasonality  -     ipratropium (Atrovent) 42 mcg (0.06 %) nasal spray; Administer 2 sprays into each nostril 3 times a day.  -     fexofenadine (Allegra) 180 mg tablet; Take 1 tablet (180 mg) by mouth once daily as needed (allergic rhinitis).  Chronic allergic conjunctivitis    Med refills    Follow-up in 1 year so we may re-assess you and develop a more long term plan.       Uriel Shields MD

## 2024-07-25 ENCOUNTER — LAB (OUTPATIENT)
Dept: LAB | Facility: LAB | Age: 79
End: 2024-07-25
Payer: MEDICARE

## 2024-07-25 ENCOUNTER — APPOINTMENT (OUTPATIENT)
Dept: CARDIOLOGY | Facility: CLINIC | Age: 79
End: 2024-07-25
Payer: MEDICARE

## 2024-07-25 DIAGNOSIS — I10 ESSENTIAL HYPERTENSION: ICD-10-CM

## 2024-07-25 DIAGNOSIS — Z01.89 ENCOUNTER FOR ROUTINE LABORATORY TESTING: ICD-10-CM

## 2024-07-25 DIAGNOSIS — E03.9 ACQUIRED HYPOTHYROIDISM: ICD-10-CM

## 2024-07-25 DIAGNOSIS — R73.03 PREDIABETES: ICD-10-CM

## 2024-07-25 DIAGNOSIS — E78.2 MIXED HYPERLIPIDEMIA: ICD-10-CM

## 2024-07-25 LAB
ALBUMIN SERPL-MCNC: 4.2 G/DL (ref 3.5–5)
ALP BLD-CCNC: 147 U/L (ref 35–125)
ALT SERPL-CCNC: 15 U/L (ref 5–40)
ANION GAP SERPL CALC-SCNC: 15 MMOL/L
AST SERPL-CCNC: 21 U/L (ref 5–40)
BASOPHILS # BLD AUTO: 0.06 X10*3/UL (ref 0–0.1)
BASOPHILS NFR BLD AUTO: 0.9 %
BILIRUB SERPL-MCNC: 0.3 MG/DL (ref 0.1–1.2)
BUN SERPL-MCNC: 18 MG/DL (ref 8–25)
CALCIUM SERPL-MCNC: 9.6 MG/DL (ref 8.5–10.4)
CHLORIDE SERPL-SCNC: 107 MMOL/L (ref 97–107)
CHOLEST SERPL-MCNC: 144 MG/DL (ref 133–200)
CHOLEST/HDLC SERPL: 2.8 {RATIO}
CO2 SERPL-SCNC: 23 MMOL/L (ref 24–31)
CREAT SERPL-MCNC: 1 MG/DL (ref 0.4–1.6)
EGFRCR SERPLBLD CKD-EPI 2021: 57 ML/MIN/1.73M*2
EOSINOPHIL # BLD AUTO: 0.35 X10*3/UL (ref 0–0.4)
EOSINOPHIL NFR BLD AUTO: 5.3 %
ERYTHROCYTE [DISTWIDTH] IN BLOOD BY AUTOMATED COUNT: 12.5 % (ref 11.5–14.5)
EST. AVERAGE GLUCOSE BLD GHB EST-MCNC: 100 MG/DL
GLUCOSE SERPL-MCNC: 97 MG/DL (ref 65–99)
HBA1C MFR BLD: 5.1 %
HCT VFR BLD AUTO: 43 % (ref 36–46)
HDLC SERPL-MCNC: 52 MG/DL
HGB BLD-MCNC: 13.2 G/DL (ref 12–16)
IMM GRANULOCYTES # BLD AUTO: 0.02 X10*3/UL (ref 0–0.5)
IMM GRANULOCYTES NFR BLD AUTO: 0.3 % (ref 0–0.9)
LDLC SERPL CALC-MCNC: 71 MG/DL (ref 65–130)
LYMPHOCYTES # BLD AUTO: 2.49 X10*3/UL (ref 0.8–3)
LYMPHOCYTES NFR BLD AUTO: 37.4 %
MCH RBC QN AUTO: 32 PG (ref 26–34)
MCHC RBC AUTO-ENTMCNC: 30.7 G/DL (ref 32–36)
MCV RBC AUTO: 104 FL (ref 80–100)
MONOCYTES # BLD AUTO: 0.67 X10*3/UL (ref 0.05–0.8)
MONOCYTES NFR BLD AUTO: 10.1 %
NEUTROPHILS # BLD AUTO: 3.06 X10*3/UL (ref 1.6–5.5)
NEUTROPHILS NFR BLD AUTO: 46 %
NRBC BLD-RTO: 0 /100 WBCS (ref 0–0)
PLATELET # BLD AUTO: 324 X10*3/UL (ref 150–450)
POTASSIUM SERPL-SCNC: 5.2 MMOL/L (ref 3.4–5.1)
PROT SERPL-MCNC: 6.7 G/DL (ref 5.9–7.9)
RBC # BLD AUTO: 4.12 X10*6/UL (ref 4–5.2)
SODIUM SERPL-SCNC: 145 MMOL/L (ref 133–145)
TRIGL SERPL-MCNC: 105 MG/DL (ref 40–150)
TSH SERPL DL<=0.05 MIU/L-ACNC: 1.29 MIU/L (ref 0.27–4.2)
WBC # BLD AUTO: 6.7 X10*3/UL (ref 4.4–11.3)

## 2024-07-25 PROCEDURE — 85025 COMPLETE CBC W/AUTO DIFF WBC: CPT

## 2024-07-25 PROCEDURE — 80053 COMPREHEN METABOLIC PANEL: CPT

## 2024-07-25 PROCEDURE — 80061 LIPID PANEL: CPT

## 2024-07-25 PROCEDURE — 36415 COLL VENOUS BLD VENIPUNCTURE: CPT

## 2024-07-25 PROCEDURE — 83036 HEMOGLOBIN GLYCOSYLATED A1C: CPT

## 2024-07-25 PROCEDURE — 84443 ASSAY THYROID STIM HORMONE: CPT

## 2024-07-29 ENCOUNTER — APPOINTMENT (OUTPATIENT)
Dept: PHYSICAL THERAPY | Facility: CLINIC | Age: 79
End: 2024-07-29
Payer: MEDICARE

## 2024-07-30 NOTE — PROGRESS NOTES
[facility]  PROGRESS NOTE      Nicolette Hatch is a 79 y.o. female that is presenting today for Follow-up (6 mo fu).    Assessment/Plan   Diagnoses and all orders for this visit:  Essential hypertension  -     Comprehensive Metabolic Panel; Future  -     CBC and Auto Differential; Future  Mixed hyperlipidemia  -     Lipid Panel; Future  Acquired hypothyroidism  -     TSH with reflex to Free T4 if abnormal; Future  Macrocytosis without anemia  Paroxysmal A-fib (Multi)  Prediabetes  Stage 3a chronic kidney disease (Multi)  Spondylosis of lumbar region without myelopathy or radiculopathy  Hyperglycemia  -     Hemoglobin A1C; Future  Vitamin D deficiency  -     Vitamin D 25-Hydroxy,Total (for eval of Vitamin D levels); Future  Other orders  -     Follow Up In Primary Care - Established  -     Follow Up In Primary Care - Medicare Annual; Future  We reviewed her current situation and her recent blood work.  Blood pressure is stable and under good control.  Cholesterol is stable and under excellent control.  She is clinically and chemically euthyroid.  Her atrial fibrillation seems to be controlled right now with minimal if any symptoms and tolerating medicines well.  Her prediabetes has continued to improve with a very excellent hemoglobin A1c.    We discussed the implications of chronic kidney disease stage IIIa.    We also discussed proper wound care for her leg I think this area will heal up gradually over time.    We also had a good conversation about asymptomatic bacteriuria.  She had this fairly recently.  She had asked for a repeat urine sample but has no symptoms at all right now and therefore we have declined to do so she does understand what  Subjective   She sees me for follow-up care this is been her 6-month follow-up appointment.  Over the last 6 months she has had a knee replacement surgery done which she did very well with.  She is feeling much better in that regard in  fact has noted as since that time that she no longer has even any swelling in her legs was able to stop your furosemide.    She developed a small wound over the left leg and then she developed a tape blister/wound from some tape that she had placed on that area.  She has been placing local care on there and does feel like it is getting better.    Review of Systems   Respiratory: Negative.     Cardiovascular: Negative.    Gastrointestinal: Negative.    Genitourinary: Negative.       Objective   Vitals:    07/31/24 0940   BP: 115/65   Pulse: 77   Temp: 36.4 °C (97.5 °F)   SpO2: 99%      Body mass index is 20.52 kg/m².  Physical Exam  Constitutional:       Appearance: Normal appearance.   HENT:      Head: Normocephalic and atraumatic.      Nose: Nose normal.   Cardiovascular:      Rate and Rhythm: Normal rate and regular rhythm.      Pulses: Normal pulses.      Heart sounds: Normal heart sounds.   Pulmonary:      Effort: Pulmonary effort is normal.      Breath sounds: Normal breath sounds.   Abdominal:      General: Abdomen is flat. Bowel sounds are normal.      Palpations: Abdomen is soft.   Musculoskeletal:         General: Normal range of motion.   Skin:     General: Skin is warm and dry.      Comments: Over her left lower leg she has what appears to be a a proximately 2 cm very shallow area that looks like the tape had pulled the superficial epidermis off.   Neurological:      Mental Status: She is alert.     Diagnostic Results   Lab Results   Component Value Date    GLUCOSE 97 07/25/2024    CALCIUM 9.6 07/25/2024     07/25/2024    K 5.2 (H) 07/25/2024    CO2 23 (L) 07/25/2024     07/25/2024    BUN 18 07/25/2024    CREATININE 1.00 07/25/2024     Lab Results   Component Value Date    ALT 15 07/25/2024    AST 21 07/25/2024    GGT 22 09/27/2022    ALKPHOS 147 (H) 07/25/2024    BILITOT 0.3 07/25/2024     Lab Results   Component Value Date    WBC 6.7 07/25/2024    HGB 13.2 07/25/2024    HCT 43.0 07/25/2024  "    (H) 07/25/2024     07/25/2024     Lab Results   Component Value Date    CHOL 144 07/25/2024    CHOL 130 (L) 01/24/2024    CHOL 140 07/24/2023     Lab Results   Component Value Date    HDL 52.0 07/25/2024    HDL 36.0 (L) 01/24/2024    HDL 48 (L) 07/24/2023     Lab Results   Component Value Date    LDLCALC 71 07/25/2024    LDLCALC 69 01/24/2024    LDLCALC 74 07/24/2023     Lab Results   Component Value Date    TRIG 105 07/25/2024    TRIG 126 01/24/2024    TRIG 91 07/24/2023     No components found for: \"CHOLHDL\"  Lab Results   Component Value Date    HGBA1C 5.1 07/25/2024     Other labs not included in the list above were reviewed either before or during this encounter.    History    Past Medical History:   Diagnosis Date   • Abnormality of pancreatic duct 09/06/2023   • Allergic conjunctivitis 01/22/2024   • Atherosclerosis of both carotid arteries 09/06/2023   • Atrial fibrillation (Multi) 09/06/2023   • Cellulitis 01/30/2023   • Cervical spine arthritis 09/06/2023   • Cervical spondylosis 09/06/2023   • Chronic reflux esophagitis 09/06/2023   • Contact with and (suspected) exposure to covid-19 09/06/2022   • Degenerative joint disease involving multiple joints 09/06/2023   • Dizziness and giddiness 12/13/2022   • Epiphora due to insufficient drainage of both sides 01/28/2016   • Essential hypertension 09/06/2023   • Female cystocele 09/06/2023   • Flexor tenosynovitis of finger 09/06/2023   • Frequency of urination 09/06/2022   • Hyperlipidemia 09/06/2023   • Hypothyroidism 09/06/2023   • Intermittent chest pain 05/21/2024   • Left bundle branch block 09/06/2023   • Lower urinary tract infectious disease 01/22/2024   • Macrocytosis without anemia 09/06/2023   • Mitral valve prolapse 09/06/2023   • Prediabetes 09/06/2023   • Primary osteoarthritis of right hip 09/06/2023   • Unspecified abdominal pain 09/06/2022     Past Surgical History:   Procedure Laterality Date   • CATARACT EXTRACTION " Bilateral 2015   • COLONOSCOPY  2008   • COLONOSCOPY  2013   • COLONOSCOPY  03/17/2018   • COLPORRHAPHY  2021    A+P   • ESOPHAGOGASTRODUODENOSCOPY  2008   • FOOT SURGERY  2007   • FOOT SURGERY Left 2019    [facility]   • HAND SURGERY Left 2005    carpometacarpo arthroplasty   • LUMBAR SPINE SURGERY  1994   • TOTAL ABDOMINAL HYSTERECTOMY W/ BILATERAL SALPINGOOPHORECTOMY  1980   • TOTAL KNEE ARTHROPLASTY Left     August 9, 2023   • TOTAL KNEE ARTHROPLASTY Right 06/05/2024    Dr. Brendan Mills   • VARICOSE VEIN SURGERY  2016    Dr. Mohseni     Family History   Problem Relation Name Age of Onset   • Colon cancer Mother     • COPD Father Champ    • Lung disease Father Champ    • Other (Calcified heart valves) Sister     • Colon cancer Brother       Social History     Socioeconomic History   • Marital status:      Spouse name: Not on file   • Number of children: Not on file   • Years of education: Not on file   • Highest education level: Not on file   Occupational History   • Not on file   Tobacco Use   • Smoking status: Never     Passive exposure: Never   • Smokeless tobacco: Never   Vaping Use   • Vaping status: Never Used   Substance and Sexual Activity   • Alcohol use: Never   • Drug use: Never   • Sexual activity: Never   Other Topics Concern   • Not on file   Social History Narrative   • Not on file     Social Determinants of Health     Financial Resource Strain: Not on file   Food Insecurity: Not on file   Transportation Needs: No Transportation Needs (6/20/2024)    OASIS : Transportation    • Lack of Transportation (Medical): No    • Lack of Transportation (Non-Medical): No    • Patient Unable or Declines to Respond: No   Physical Activity: Not on file   Stress: Not on file   Social Connections: Feeling Socially Integrated (6/20/2024)    OASIS : Social Isolation    • Frequency of experiencing loneliness or isolation: Never   Intimate Partner Violence: Not on file   Housing Stability: Not on file      Allergies   Allergen Reactions   • Codeine Other, GI Upset, Nausea/vomiting and Drowsiness     Nausea    Flu-like sx    oversedation   • Indomethacin Swelling   • Nitrofurantoin Rash   • Nitrofurantoin Monohyd/M-Cryst Rash     Current Outpatient Medications on File Prior to Visit   Medication Sig Dispense Refill   • acetaminophen (Tylenol) 325 mg tablet Take 1 tablet (325 mg) by mouth if needed for mild pain (1 - 3).     • amiodarone (Pacerone) 100 mg tablet TAKE 1 TABLET BY MOUTH ONCE DAILY 90 tablet 4   • atorvastatin (Lipitor) 40 mg tablet Take 1 tablet (40 mg) by mouth once daily.     • cholecalciferol (Vitamin D-3) 25 MCG (1000 UT) capsule Take 1 capsule (25 mcg) by mouth once daily.     • cyanocobalamin (Vitamin B-12) 1,000 mcg tablet Take 1 tablet (1,000 mcg) by mouth once daily.     • Eliquis 5 mg tablet Take 1 tablet (5 mg) by mouth 2 times a day. AS DIRECTED     • fexofenadine (Allegra) 180 mg tablet Take 1 tablet (180 mg) by mouth once daily as needed (allergic rhinitis). 30 tablet 11   • ipratropium (Atrovent) 42 mcg (0.06 %) nasal spray Administer 2 sprays into each nostril 3 times a day. 15 mL 11   • Lactobacillus acidophilus (ACIDOPHILUS ORAL) Take 120 mg by mouth once daily. PROBIOTIC     • levothyroxine (Synthroid, Levoxyl) 112 mcg tablet Take 1 tablet (112 mcg) by mouth once daily. 90 tablet 3   • losartan (Cozaar) 25 mg tablet take 1 tablet Orally Once a day. 90 tablet 3   • MAGNESIUM ORAL Magnesium 500 MG  1 tablet with a meal Orally Once a day     • [DISCONTINUED] apixaban (Eliquis) 2.5 mg tablet Take 2 tablets by mouth 2 times a day.     • [DISCONTINUED] carisoprodol (Soma) 350 mg tablet Take 350 mg by mouth 3 times a day as needed for muscle spasms. Indications: muscle spasm     • [DISCONTINUED] furosemide (Lasix) 40 mg tablet Take 1 tablet (40 mg) by mouth once daily. 3 tablet 11   • [DISCONTINUED] oxyCODONE-acetaminophen (Percocet) 5-325 mg tablet Take 1 tablet by mouth every 4 hours.  Indications: pain, severe       No current facility-administered medications on file prior to visit.     Immunization History   Administered Date(s) Administered   • Flu vaccine, quadrivalent, high-dose, preservative free, age 65y+ (FLUZONE) 10/08/2020, 09/23/2021, 10/05/2022, 09/20/2023   • Influenza, High Dose Seasonal, Preservative Free 11/03/2015, 10/17/2016, 09/27/2017, 10/22/2018, 10/07/2019   • Influenza, seasonal, injectable 10/18/2010, 10/10/2011, 10/29/2012, 09/24/2013, 10/07/2014   • Moderna COVID-19 vaccine, Fall 2023, 12 yeasrs and older (50mcg/0.5mL) 12/05/2023   • Moderna COVID-19 vaccine, bivalent, blue cap/gray label *Check age/dose* 12/09/2022, 05/07/2023   • Moderna SARS-CoV-2 Vaccination 03/01/2021, 03/29/2021, 11/11/2021   • Pneumococcal conjugate vaccine, 13-valent (PREVNAR 13) 07/13/2015   • Pneumococcal conjugate vaccine, 20-valent (PREVNAR 20) 10/05/2022   • Pneumococcal polysaccharide vaccine, 23-valent, age 2 years and older (PNEUMOVAX 23) 07/29/2008, 09/24/2013   • Td vaccine, age 7 years and older (TDVAX) 07/29/2005   • Tdap vaccine, age 7 year and older (BOOSTRIX, ADACEL) 01/18/2016   • Zoster vaccine, recombinant, adult (SHINGRIX) 02/07/2019, 08/28/2020   • Zoster, live 02/29/2008     Patient's medical history was reviewed and updated either before or during this encounter.       Oliver Mcgraw MD

## 2024-07-31 ENCOUNTER — OFFICE VISIT (OUTPATIENT)
Dept: PRIMARY CARE | Facility: CLINIC | Age: 79
End: 2024-07-31
Payer: MEDICARE

## 2024-07-31 VITALS
HEIGHT: 67 IN | SYSTOLIC BLOOD PRESSURE: 115 MMHG | WEIGHT: 131 LBS | DIASTOLIC BLOOD PRESSURE: 65 MMHG | OXYGEN SATURATION: 99 % | TEMPERATURE: 97.5 F | HEART RATE: 77 BPM | BODY MASS INDEX: 20.56 KG/M2

## 2024-07-31 DIAGNOSIS — N18.31 STAGE 3A CHRONIC KIDNEY DISEASE (MULTI): ICD-10-CM

## 2024-07-31 DIAGNOSIS — I10 ESSENTIAL HYPERTENSION: Primary | ICD-10-CM

## 2024-07-31 DIAGNOSIS — E78.2 MIXED HYPERLIPIDEMIA: ICD-10-CM

## 2024-07-31 DIAGNOSIS — R73.03 PREDIABETES: ICD-10-CM

## 2024-07-31 DIAGNOSIS — M47.816 SPONDYLOSIS OF LUMBAR REGION WITHOUT MYELOPATHY OR RADICULOPATHY: ICD-10-CM

## 2024-07-31 DIAGNOSIS — E03.9 ACQUIRED HYPOTHYROIDISM: ICD-10-CM

## 2024-07-31 DIAGNOSIS — R73.9 HYPERGLYCEMIA: ICD-10-CM

## 2024-07-31 DIAGNOSIS — E55.9 VITAMIN D DEFICIENCY: ICD-10-CM

## 2024-07-31 DIAGNOSIS — D75.89 MACROCYTOSIS WITHOUT ANEMIA: ICD-10-CM

## 2024-07-31 DIAGNOSIS — I48.0 PAROXYSMAL A-FIB (MULTI): ICD-10-CM

## 2024-07-31 PROCEDURE — 3074F SYST BP LT 130 MM HG: CPT | Performed by: INTERNAL MEDICINE

## 2024-07-31 PROCEDURE — 99214 OFFICE O/P EST MOD 30 MIN: CPT | Performed by: INTERNAL MEDICINE

## 2024-07-31 PROCEDURE — 1125F AMNT PAIN NOTED PAIN PRSNT: CPT | Performed by: INTERNAL MEDICINE

## 2024-07-31 PROCEDURE — 1160F RVW MEDS BY RX/DR IN RCRD: CPT | Performed by: INTERNAL MEDICINE

## 2024-07-31 PROCEDURE — 3078F DIAST BP <80 MM HG: CPT | Performed by: INTERNAL MEDICINE

## 2024-07-31 PROCEDURE — 1036F TOBACCO NON-USER: CPT | Performed by: INTERNAL MEDICINE

## 2024-07-31 PROCEDURE — 1159F MED LIST DOCD IN RCRD: CPT | Performed by: INTERNAL MEDICINE

## 2024-07-31 PROCEDURE — G2211 COMPLEX E/M VISIT ADD ON: HCPCS | Performed by: INTERNAL MEDICINE

## 2024-07-31 RX ORDER — APIXABAN 5 MG/1
5 TABLET, FILM COATED ORAL 2 TIMES DAILY
COMMUNITY
Start: 2024-07-15

## 2024-07-31 ASSESSMENT — ENCOUNTER SYMPTOMS
GASTROINTESTINAL NEGATIVE: 1
CARDIOVASCULAR NEGATIVE: 1
RESPIRATORY NEGATIVE: 1

## 2024-07-31 ASSESSMENT — PAIN SCALES - GENERAL: PAINLEVEL: 8

## 2024-07-31 NOTE — PATIENT INSTRUCTIONS
It looks like you are doing pretty well.  I am glad that you had a good outcome from your joint replacement surgery.  As you know we reviewed your recent blood work with no changes necessary in your medications.    As a relates to your blood work I think you now understand what chronic kidney disease stage IIIa means.  From your perspective just important that you avoid Aleve Advil and Motrin.  You really should not be taking those medicines anyway because your blood thinners.    Otherwise I will just plan on seeing you back in 6 months for your regular checkup.    As a relates to a small wound that you have on your leg please feel free as we talked about to clean it with soap and water on a daily basis pat it dry I and I will place some type of an antibiotic cream over it with dressing.  You can leave it open to air if you are just going to be sitting on the couch in a protected fashion.  I think this will heal up if it does not please let me know and we will get you to dermatology.

## 2024-08-07 ENCOUNTER — APPOINTMENT (OUTPATIENT)
Dept: RADIOLOGY | Facility: CLINIC | Age: 79
End: 2024-08-07
Payer: MEDICARE

## 2024-08-21 ENCOUNTER — HOSPITAL ENCOUNTER (OUTPATIENT)
Dept: RADIOLOGY | Facility: CLINIC | Age: 79
Discharge: HOME | End: 2024-08-21
Payer: MEDICARE

## 2024-08-21 DIAGNOSIS — K86.2 PANCREAS CYST (HHS-HCC): ICD-10-CM

## 2024-08-21 DIAGNOSIS — Q45.3 ABNORMALITY OF PANCREATIC DUCT: ICD-10-CM

## 2024-08-21 PROCEDURE — 74183 MRI ABD W/O CNTR FLWD CNTR: CPT

## 2024-08-21 PROCEDURE — 2550000001 HC RX 255 CONTRASTS: Performed by: PHYSICIAN ASSISTANT

## 2024-08-21 PROCEDURE — 74183 MRI ABD W/O CNTR FLWD CNTR: CPT | Performed by: RADIOLOGY

## 2024-08-21 PROCEDURE — 76376 3D RENDER W/INTRP POSTPROCES: CPT | Performed by: RADIOLOGY

## 2024-08-21 PROCEDURE — A9575 INJ GADOTERATE MEGLUMI 0.1ML: HCPCS | Performed by: PHYSICIAN ASSISTANT

## 2024-08-21 RX ORDER — GADOTERATE MEGLUMINE 376.9 MG/ML
12 INJECTION INTRAVENOUS
Status: COMPLETED | OUTPATIENT
Start: 2024-08-21 | End: 2024-08-21

## 2024-08-28 ENCOUNTER — TELEMEDICINE (OUTPATIENT)
Dept: SURGICAL ONCOLOGY | Facility: CLINIC | Age: 79
End: 2024-08-28
Payer: MEDICARE

## 2024-08-28 DIAGNOSIS — D37.8: ICD-10-CM

## 2024-08-28 DIAGNOSIS — K86.9 DISORDER OF PANCREATIC DUCT (HHS-HCC): Primary | ICD-10-CM

## 2024-08-28 PROCEDURE — 99441 PR PHYS/QHP TELEPHONE EVALUATION 5-10 MIN: CPT | Performed by: PHYSICIAN ASSISTANT

## 2024-08-28 NOTE — PROGRESS NOTES
A telephone visit (audio connection only) between the patient (at the originating site) and the provider (at the distant site) was utilized to provide this telehealth service.    Verbal consent was requested and obtained from the patient immediately prior to the telehealth visit.     Subjective   Ms. Hatch is an 79-year-old female who presents to discuss surveillance MRI results for focal pancreatic duct dilatation.      She was initially referred for an EUS after a CT scan at another institution showed pancreatic ductal dilatation. She had the EUS on 6/02/22 with Ines Hsieh. This demonstrated a 3 mm pancreatic duct in the head, genu and body with transition to 8 mm in the tail with band-like hyperechoic stranding at the site of transition. No distinct mass or stricture was noted.     She then had an MRI that demonstrated a markedly dilated pancreatic due in the body and tail likely secondary to a stricture at the neck without discrete mass.      Her imaging was reviewed at our multidisciplinary pancreas conference. The group recommended a surgical referral. She declined and thus surveillance with an MRI in 6 months was recommended. She did have a negative IMMray test and a  of 14.      She was continued on close interval surveillance which was eventually extended to annually.    MRI/MRCP from 8/22/24 demonstrates abrupt termination of the main PD in the body with upstream duct measuring 1.2 cm with multiple dilated side branches and a cyst in the pancreatic neck measuring 1.4 cm x 0.6 cm (compared with 0.8 cm x 0.2 cm in 2023).      She has no complaints today. She is recovering from a recent knee replacement. Her appetite has been a little less as a result but her weight is stable. She denies chronic abdominal pain, jaundice or diabetes.     Medical and surgical history: Atrial fibrillation (on Eliquis), HLD, OA, hypothyroid, chronic constipation, s/p bilateral knee replacement.  Family history: No  pancreatitis or pancreatic cancer. Mother had colon cancer.   Social history: Non-smoker. No alcohol use. No illicits.       Objective     There were no vitals taken for this visit.     Physical Exam  A physical exam was not conducted as this was a phone or virtual visit. The patient is in no acute distress.     Assessment/Plan   Ms. Hatch is an 79-year-old female who presents to discuss surveillance MRI results for focal pancreatic duct dilatation.     PLAN: Her imaging was reviewed at our multidisciplinary pancreas conference this morning. Her main pancreatic duct measures 12 mm in the body/tail (previously 10 mm in 2022). The pancreas is very atrophic. Overall, there has been minimal radiographic change over the past 2 years.     I have ordered a  to obtain with her next blood draw. We will continue annual surveillance with MRI/MRCP in 1 year.       Mabel Red PA-C

## 2024-09-01 DIAGNOSIS — I48.91 UNSPECIFIED ATRIAL FIBRILLATION (MULTI): ICD-10-CM

## 2024-09-01 DIAGNOSIS — E78.5 HYPERLIPIDEMIA, UNSPECIFIED: ICD-10-CM

## 2024-09-03 RX ORDER — APIXABAN 5 MG/1
5 TABLET, FILM COATED ORAL 2 TIMES DAILY
Qty: 60 TABLET | Refills: 6 | Status: SHIPPED | OUTPATIENT
Start: 2024-09-03

## 2024-09-03 RX ORDER — ATORVASTATIN CALCIUM 40 MG/1
40 TABLET, FILM COATED ORAL DAILY
Qty: 90 TABLET | Refills: 3 | Status: SHIPPED | OUTPATIENT
Start: 2024-09-03

## 2024-09-05 ENCOUNTER — APPOINTMENT (OUTPATIENT)
Dept: RADIOLOGY | Facility: HOSPITAL | Age: 79
End: 2024-09-05
Payer: MEDICARE

## 2024-09-05 ENCOUNTER — HOSPITAL ENCOUNTER (EMERGENCY)
Facility: HOSPITAL | Age: 79
Discharge: HOME | End: 2024-09-05
Payer: MEDICARE

## 2024-09-05 VITALS
DIASTOLIC BLOOD PRESSURE: 70 MMHG | RESPIRATION RATE: 16 BRPM | BODY MASS INDEX: 20.4 KG/M2 | HEART RATE: 76 BPM | OXYGEN SATURATION: 98 % | WEIGHT: 130 LBS | TEMPERATURE: 98.6 F | SYSTOLIC BLOOD PRESSURE: 174 MMHG | HEIGHT: 67 IN

## 2024-09-05 DIAGNOSIS — M25.462 KNEE EFFUSION, LEFT: Primary | ICD-10-CM

## 2024-09-05 LAB
ANION GAP SERPL CALC-SCNC: 9 MMOL/L
BASOPHILS # BLD AUTO: 0.01 X10*3/UL (ref 0–0.1)
BASOPHILS NFR BLD AUTO: 0.1 %
BUN SERPL-MCNC: 30 MG/DL (ref 8–25)
CALCIUM SERPL-MCNC: 8.7 MG/DL (ref 8.5–10.4)
CHLORIDE SERPL-SCNC: 104 MMOL/L (ref 97–107)
CO2 SERPL-SCNC: 24 MMOL/L (ref 24–31)
CREAT SERPL-MCNC: 0.9 MG/DL (ref 0.4–1.6)
CRP SERPL-MCNC: <0.3 MG/DL (ref 0–2)
EGFRCR SERPLBLD CKD-EPI 2021: 64 ML/MIN/1.73M*2
EOSINOPHIL # BLD AUTO: 0 X10*3/UL (ref 0–0.4)
EOSINOPHIL NFR BLD AUTO: 0 %
ERYTHROCYTE [DISTWIDTH] IN BLOOD BY AUTOMATED COUNT: 12.7 % (ref 11.5–14.5)
ERYTHROCYTE [SEDIMENTATION RATE] IN BLOOD BY WESTERGREN METHOD: 21 MM/H (ref 0–30)
GLUCOSE SERPL-MCNC: 156 MG/DL (ref 65–99)
HCT VFR BLD AUTO: 39.4 % (ref 36–46)
HGB BLD-MCNC: 12.4 G/DL (ref 12–16)
IMM GRANULOCYTES # BLD AUTO: 0.05 X10*3/UL (ref 0–0.5)
IMM GRANULOCYTES NFR BLD AUTO: 0.5 % (ref 0–0.9)
LYMPHOCYTES # BLD AUTO: 0.88 X10*3/UL (ref 0.8–3)
LYMPHOCYTES NFR BLD AUTO: 8 %
MCH RBC QN AUTO: 30.5 PG (ref 26–34)
MCHC RBC AUTO-ENTMCNC: 31.5 G/DL (ref 32–36)
MCV RBC AUTO: 97 FL (ref 80–100)
MONOCYTES # BLD AUTO: 0.23 X10*3/UL (ref 0.05–0.8)
MONOCYTES NFR BLD AUTO: 2.1 %
NEUTROPHILS # BLD AUTO: 9.82 X10*3/UL (ref 1.6–5.5)
NEUTROPHILS NFR BLD AUTO: 89.3 %
NRBC BLD-RTO: 0 /100 WBCS (ref 0–0)
PLATELET # BLD AUTO: 269 X10*3/UL (ref 150–450)
POTASSIUM SERPL-SCNC: 5 MMOL/L (ref 3.4–5.1)
RBC # BLD AUTO: 4.07 X10*6/UL (ref 4–5.2)
SODIUM SERPL-SCNC: 137 MMOL/L (ref 133–145)
WBC # BLD AUTO: 11 X10*3/UL (ref 4.4–11.3)

## 2024-09-05 PROCEDURE — 2500000001 HC RX 250 WO HCPCS SELF ADMINISTERED DRUGS (ALT 637 FOR MEDICARE OP): Performed by: NURSE PRACTITIONER

## 2024-09-05 PROCEDURE — 73564 X-RAY EXAM KNEE 4 OR MORE: CPT | Mod: LEFT SIDE | Performed by: RADIOLOGY

## 2024-09-05 PROCEDURE — 85652 RBC SED RATE AUTOMATED: CPT | Performed by: NURSE PRACTITIONER

## 2024-09-05 PROCEDURE — 85025 COMPLETE CBC W/AUTO DIFF WBC: CPT | Performed by: NURSE PRACTITIONER

## 2024-09-05 PROCEDURE — 80048 BASIC METABOLIC PNL TOTAL CA: CPT | Performed by: NURSE PRACTITIONER

## 2024-09-05 PROCEDURE — 99283 EMERGENCY DEPT VISIT LOW MDM: CPT

## 2024-09-05 PROCEDURE — 73564 X-RAY EXAM KNEE 4 OR MORE: CPT | Mod: LT

## 2024-09-05 PROCEDURE — 36415 COLL VENOUS BLD VENIPUNCTURE: CPT | Performed by: NURSE PRACTITIONER

## 2024-09-05 PROCEDURE — 86140 C-REACTIVE PROTEIN: CPT | Performed by: NURSE PRACTITIONER

## 2024-09-05 RX ORDER — HYDROCODONE BITARTRATE AND ACETAMINOPHEN 5; 325 MG/1; MG/1
1 TABLET ORAL ONCE
Status: COMPLETED | OUTPATIENT
Start: 2024-09-05 | End: 2024-09-05

## 2024-09-05 RX ORDER — HYDROCODONE BITARTRATE AND ACETAMINOPHEN 5; 325 MG/1; MG/1
1 TABLET ORAL EVERY 6 HOURS PRN
Qty: 10 TABLET | Refills: 0 | Status: SHIPPED | OUTPATIENT
Start: 2024-09-05 | End: 2024-09-07

## 2024-09-05 ASSESSMENT — COLUMBIA-SUICIDE SEVERITY RATING SCALE - C-SSRS
6. HAVE YOU EVER DONE ANYTHING, STARTED TO DO ANYTHING, OR PREPARED TO DO ANYTHING TO END YOUR LIFE?: NO
1. IN THE PAST MONTH, HAVE YOU WISHED YOU WERE DEAD OR WISHED YOU COULD GO TO SLEEP AND NOT WAKE UP?: NO
2. HAVE YOU ACTUALLY HAD ANY THOUGHTS OF KILLING YOURSELF?: NO

## 2024-09-05 ASSESSMENT — PAIN - FUNCTIONAL ASSESSMENT: PAIN_FUNCTIONAL_ASSESSMENT: 0-10

## 2024-09-05 ASSESSMENT — PAIN SCALES - GENERAL: PAINLEVEL_OUTOF10: 10 - WORST POSSIBLE PAIN

## 2024-09-05 NOTE — ED PROVIDER NOTES
HPI   Chief Complaint   Patient presents with    Knee Pain     Pt states she was working in the garage and then got sudden pain in the left knee.  Has had a knee replacement on the affected side.        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    Allergic conjunctivitis 01/22/2024    Atherosclerosis of both carotid arteries 09/06/2023    Atrial fibrillation (Multi) 09/06/2023    Cellulitis 01/30/2023    Cervical spine arthritis 09/06/2023    Cervical spondylosis 09/06/2023    Chronic reflux esophagitis 09/06/2023    Contact with and (suspected) exposure to covid-19 09/06/2022    Degenerative joint disease involving multiple joints 09/06/2023    Dizziness and giddiness 12/13/2022    Epiphora due to insufficient drainage of both sides 01/28/2016    Essential hypertension 09/06/2023    Female cystocele 09/06/2023    Flexor tenosynovitis of finger 09/06/2023    Frequency of urination 09/06/2022    Hyperlipidemia 09/06/2023    Hypothyroidism 09/06/2023    Intermittent chest pain 05/21/2024    Left bundle branch block 09/06/2023    Lower urinary tract infectious disease 01/22/2024    Macrocytosis without anemia 09/06/2023    Mitral valve prolapse 09/06/2023    Prediabetes 09/06/2023    Primary osteoarthritis of right hip 09/06/2023    Unspecified abdominal pain 09/06/2022     Past Surgical History:   Procedure Laterality Date    CATARACT EXTRACTION Bilateral 2015    COLONOSCOPY  2008    COLONOSCOPY  2013    COLONOSCOPY  03/17/2018    COLPORRHAPHY  2021    A+P    ESOPHAGOGASTRODUODENOSCOPY  2008    FOOT SURGERY  2007    FOOT SURGERY Left 2019    [facility]    HAND SURGERY Left 2005    carpometacarpo arthroplasty    LUMBAR SPINE SURGERY  1994    TOTAL ABDOMINAL HYSTERECTOMY W/ BILATERAL SALPINGOOPHORECTOMY  1980    TOTAL KNEE ARTHROPLASTY Left     August 9, 2023    TOTAL KNEE ARTHROPLASTY Right 06/05/2024    Dr. Brendan Mills    VARICOSE VEIN  SURGERY  2016    Dr. Mohseni     Family History   Problem Relation Name Age of Onset    Colon cancer Mother      COPD Father Champ     Lung disease Father Champ     Other (Calcified heart valves) Sister      Colon cancer Brother       Social History     Tobacco Use    Smoking status: Never     Passive exposure: Never    Smokeless tobacco: Never   Vaping Use    Vaping status: Never Used   Substance Use Topics    Alcohol use: Never    Drug use: Never       Physical Exam   ED Triage Vitals [09/05/24 1631]   Temperature Heart Rate Respirations BP   37 °C (98.6 °F) 76 16 (!) 197/94      Pulse Ox Temp src Heart Rate Source Patient Position   98 % -- -- --      BP Location FiO2 (%)     -- --       Physical Exam  CONSTITUTIONAL: Vital signs reviewed as charted, well-developed and in no distress  Eyes: Extraocular muscles are intact. Pupils equal round and reactive to light. Conjunctiva are pink.    ENT: Mucous membranes are moist. Tongue in the midline. Pharynx was without erythema or exudates, uvula midline  LUNGS: Breath sounds equal and clear to auscultation. Good air exchange, no wheezes rales or retractions, pulse oximetry is charted.  HEART: Regular rate and rhythm without murmur thrill or rub, strong tones, auscultation is normal.  ABDOMEN: Soft and nontender without guarding rebound rigidity or mass. Bowel sounds are present and normal in all quadrants. There is no palpable masses or aneurysms identified. No hepatosplenomegaly, normal abdominal exam.  Neuro: The patient is awake, alert and oriented ×3. Moving all 4 extremities and answering questions appropriately.   MUSCULOSKELETAL: Examination of the left lower leg shows a large joint effusion of the knee, there is no erythema or warmth to the joint.  PSYCH: Awake alert oriented, normal mood and affect.  Skin:  Dry, normal color, warm to the touch, no rash present.        ED Course & MDM   Diagnoses as of 09/05/24 1806   Knee effusion, left                 No data  "recorded     Praful Coma Scale Score: 15 (09/05/24 1645 : Sara Guillermo RN)                           Medical Decision Making  History obtained from: patient    Vital signs, nursing notes, current medications, past medical history, Surgical history, allergies, social history, family History were reviewed.         HPI:  Patient 79-year-old female presenting emergency room today for evaluation of sudden onset left knee pain with joint effusion.  States she actually had the same thing happened about a week ago saw her orthopedic surgeon as she is 1 year status post total knee arthroplasty and was given a Medrol Dosepak states everything went better and then today she was at work in the garage when it happened again.  States called orthopedic surgeon and recommended she go to the emergency room and get blood work done.      10 point ROS was reviewed and negative except Noted above in HPI.  DDX: as listed above          MDM Summary/considerations:  EMERGENCY DEPARTMENT COURSE and DIFFERENTIAL DIAGNOSIS/MDM:    The patient presented with a chief complaint of left knee pain and swelling. The differential diagnosis associated with this patient's presentation includes internal derangement, loosening of hardware, fracture, septic joint.     Vitals:    Vitals:    09/05/24 1631   BP: (!) 197/94   Pulse: 76   Resp: 16   Temp: 37 °C (98.6 °F)   SpO2: 98%   Weight: 59 kg (130 lb)   Height: 1.702 m (5' 7\")       Diagnoses as of 09/05/24 1806   Knee effusion, left       History Limited by:    None    Independent history obtained from:    None    External records reviewed:    None    Diagnostics interpreted by me:    Xray(s) left knee    Discussions with other clinicians:    Orthopedic surgery Dr. Keane    Chronic conditions impacting care:    None    Social determinants of health affecting care:    None    Diagnostic tests considered but not performed: none    ED Medications managed:    Medications   HYDROcodone-acetaminophen " (Norco) 5-325 mg per tablet 1 tablet (1 tablet oral Given 9/5/24 1756)       Prescription drugs considered:    None    Screenings:          Labs Reviewed   CBC WITH AUTO DIFFERENTIAL - Abnormal       Result Value    WBC 11.0      nRBC 0.0      RBC 4.07      Hemoglobin 12.4      Hematocrit 39.4      MCV 97      MCH 30.5      MCHC 31.5 (*)     RDW 12.7      Platelets 269      Neutrophils % 89.3      Immature Granulocytes %, Automated 0.5      Lymphocytes % 8.0      Monocytes % 2.1      Eosinophils % 0.0      Basophils % 0.1      Neutrophils Absolute 9.82 (*)     Immature Granulocytes Absolute, Automated 0.05      Lymphocytes Absolute 0.88      Monocytes Absolute 0.23      Eosinophils Absolute 0.00      Basophils Absolute 0.01     BASIC METABOLIC PANEL - Abnormal    Glucose 156 (*)     Sodium 137      Potassium 5.0      Chloride 104      Bicarbonate 24      Urea Nitrogen 30 (*)     Creatinine 0.90      eGFR 64      Calcium 8.7      Anion Gap 9     SEDIMENTATION RATE, AUTOMATED - Normal    Sedimentation Rate 21     C-REACTIVE PROTEIN - Normal    C-Reactive Protein <0.30       XR knee left 4+ views   Final Result   1. New postoperative changes of left total knee arthroplasty with no   evidence of acute bony abnormality.   2. Large amount of joint fluid is present.   3. Soft tissue swelling in the region of the patellar tendon.        Signed by: Meena Art 9/5/2024 5:35 PM   Dictation workstation:   [id number]        Medications   HYDROcodone-acetaminophen (Norco) 5-325 mg per tablet 1 tablet (1 tablet oral Given 9/5/24 1756)     New Prescriptions    HYDROCODONE-ACETAMINOPHEN (NORCO) 5-325 MG TABLET    Take 1 tablet by mouth every 6 hours if needed for severe pain (7 - 10) for up to 2 days.     I estimate there is LOW risk for COMPARTMENT SYNDROME, DEEP VENOUS THROMBOSIS, SEPTIC ARTHRITIS, TENDON OR NEUROVASCULAR INJURY, thus I consider the discharge disposition reasonable. We have discussed the diagnosis and risks,  and we agree with discharging home to follow-up with their primary doctor or the referral orthopedist. We also discussed returning to the Emergency Department immediately if new or worsening symptoms occur. We have discussed the symptoms which aremost concerning (e.g., changing or worsening pain, numbness, weakness) that necessitates immediate return.      X-ray shows no evidence of fracture, there is a large joint effusion present.  I did speak with the patient's orthopedic surgery team Dr. Rao who states they will see the patient in the office tomorrow.  There is no evidence of infection noted.  Normal sed rate CRP and no evidence of leukocytosis.  Patient was discharged home stable condition shortness apply pain medication.    All of the patient's questions were answered to the best of my ability.  Patient states understanding that they have been screened for an emergency today and we have not found any etiology of symptoms that requires emergent treatment or admission to the hospital at this point. They understand that they have not had definitive care day and require follow-up for treatment of their condition. They also state understanding that they may have an emergent condition that may potentially have not of detected at this visit and they must return to the emergency department if they develop any worsening of symptoms or new complaints.      I have evaluated this patient, my supervising physician was available for consultation.                Critical Care: Not warranted at this time        This chart was completed using voice recognition transcription software. Please excuse any errors of transcription including grammatical, punctuation, syntax and spelling errors.  Please contact me with any questions regarding this chart.    Procedure  Procedures     TREMAINE Herrera-JANEEN  09/05/24 9585

## 2024-09-06 ENCOUNTER — LAB REQUISITION (OUTPATIENT)
Dept: LAB | Facility: HOSPITAL | Age: 79
End: 2024-09-06
Payer: MEDICARE

## 2024-09-06 DIAGNOSIS — M17.12 UNILATERAL PRIMARY OSTEOARTHRITIS, LEFT KNEE: ICD-10-CM

## 2024-09-06 LAB
BASOPHILS NFR FLD MANUAL: 0 %
BLASTS NFR FLD MANUAL: 0 %
CLARITY FLD: ABNORMAL
COLOR FLD: ABNORMAL
EOSINOPHIL NFR FLD MANUAL: 0 %
IMMATURE GRANULOCYTES IN FLUID: 0 %
LYMPHOCYTES NFR FLD MANUAL: 7 %
MONOS+MACROS NFR FLD MANUAL: 6 %
NEUTROPHILS NFR FLD MANUAL: 87 %
OTHER CELLS NFR FLD MANUAL: 0 %
PLASMA CELLS NFR FLD MANUAL: 0 %
RBC # FLD AUTO: ABNORMAL /UL
TOTAL CELLS COUNTED FLD: 100
WBC # FLD AUTO: 3941 /UL

## 2024-09-06 PROCEDURE — 89060 EXAM SYNOVIAL FLUID CRYSTALS: CPT

## 2024-09-06 PROCEDURE — 89051 BODY FLUID CELL COUNT: CPT

## 2024-09-06 PROCEDURE — 87070 CULTURE OTHR SPECIMN AEROBIC: CPT

## 2024-09-06 PROCEDURE — 87075 CULTR BACTERIA EXCEPT BLOOD: CPT

## 2024-09-07 LAB — CRYSTALS FLD MICRO: NORMAL

## 2024-09-08 LAB
BACTERIA SPEC CULT: NORMAL
GRAM STN SPEC: NORMAL
GRAM STN SPEC: NORMAL

## 2024-09-09 LAB
BACTERIA SPEC CULT: NORMAL
GRAM STN SPEC: NORMAL
GRAM STN SPEC: NORMAL

## 2024-09-13 ENCOUNTER — LAB (OUTPATIENT)
Dept: LAB | Facility: LAB | Age: 79
End: 2024-09-13
Payer: MEDICARE

## 2024-09-13 ENCOUNTER — LAB REQUISITION (OUTPATIENT)
Dept: LAB | Facility: HOSPITAL | Age: 79
End: 2024-09-13
Payer: MEDICARE

## 2024-09-13 ENCOUNTER — TELEPHONE (OUTPATIENT)
Dept: CARDIOLOGY | Facility: CLINIC | Age: 79
End: 2024-09-13

## 2024-09-13 DIAGNOSIS — D37.8: ICD-10-CM

## 2024-09-13 DIAGNOSIS — M25.562 PAIN IN LEFT KNEE: ICD-10-CM

## 2024-09-13 DIAGNOSIS — K86.9 DISORDER OF PANCREATIC DUCT (HHS-HCC): ICD-10-CM

## 2024-09-13 DIAGNOSIS — M25.562 PAIN IN LEFT KNEE: Primary | ICD-10-CM

## 2024-09-13 LAB
BASOPHILS # BLD AUTO: 0.07 X10*3/UL (ref 0–0.1)
BASOPHILS NFR BLD AUTO: 0.8 %
CANCER AG19-9 SERPL-ACNC: 19.51 U/ML
CLARITY FLD: ABNORMAL
COLOR FLD: ABNORMAL
CRP SERPL-MCNC: 0.7 MG/DL (ref 0–2)
EOSINOPHIL # BLD AUTO: 0.22 X10*3/UL (ref 0–0.4)
EOSINOPHIL NFR BLD AUTO: 2.4 %
EOSINOPHIL NFR FLD MANUAL: 6 %
ERYTHROCYTE [DISTWIDTH] IN BLOOD BY AUTOMATED COUNT: 13.4 % (ref 11.5–14.5)
ERYTHROCYTE [SEDIMENTATION RATE] IN BLOOD BY WESTERGREN METHOD: 31 MM/H (ref 0–30)
HCT VFR BLD AUTO: 41.2 % (ref 36–46)
HGB BLD-MCNC: 12.9 G/DL (ref 12–16)
IMM GRANULOCYTES # BLD AUTO: 0.06 X10*3/UL (ref 0–0.5)
IMM GRANULOCYTES NFR BLD AUTO: 0.7 % (ref 0–0.9)
LYMPHOCYTES # BLD AUTO: 2.21 X10*3/UL (ref 0.8–3)
LYMPHOCYTES NFR BLD AUTO: 24 %
LYMPHOCYTES NFR FLD MANUAL: 17 %
MCH RBC QN AUTO: 30.9 PG (ref 26–34)
MCHC RBC AUTO-ENTMCNC: 31.3 G/DL (ref 32–36)
MCV RBC AUTO: 99 FL (ref 80–100)
MONOCYTES # BLD AUTO: 0.97 X10*3/UL (ref 0.05–0.8)
MONOCYTES NFR BLD AUTO: 10.5 %
MONOS+MACROS NFR FLD MANUAL: 46 %
NEUTROPHILS # BLD AUTO: 5.69 X10*3/UL (ref 1.6–5.5)
NEUTROPHILS NFR BLD AUTO: 61.6 %
NEUTROPHILS NFR FLD MANUAL: 31 %
NRBC BLD-RTO: 0 /100 WBCS (ref 0–0)
PLATELET # BLD AUTO: 285 X10*3/UL (ref 150–450)
RBC # BLD AUTO: 4.17 X10*6/UL (ref 4–5.2)
RBC # FLD AUTO: ABNORMAL /UL
TOTAL CELLS COUNTED FLD: 100
WBC # BLD AUTO: 9.2 X10*3/UL (ref 4.4–11.3)
WBC # FLD AUTO: 3400 /UL

## 2024-09-13 PROCEDURE — 87075 CULTR BACTERIA EXCEPT BLOOD: CPT

## 2024-09-13 PROCEDURE — 86301 IMMUNOASSAY TUMOR CA 19-9: CPT

## 2024-09-13 PROCEDURE — 86140 C-REACTIVE PROTEIN: CPT

## 2024-09-13 PROCEDURE — 89051 BODY FLUID CELL COUNT: CPT

## 2024-09-13 PROCEDURE — 85025 COMPLETE CBC W/AUTO DIFF WBC: CPT

## 2024-09-13 PROCEDURE — 87205 SMEAR GRAM STAIN: CPT

## 2024-09-13 PROCEDURE — 87070 CULTURE OTHR SPECIMN AEROBIC: CPT

## 2024-09-13 PROCEDURE — 85652 RBC SED RATE AUTOMATED: CPT

## 2024-09-13 PROCEDURE — 36415 COLL VENOUS BLD VENIPUNCTURE: CPT

## 2024-09-13 NOTE — TELEPHONE ENCOUNTER
Pt called  the office stated she was advice from her Surgeon Dr. Mills to reach out to you regarding  cutting back on eliquis  due to blood pooling around left knee joints  Please advice [phone]

## 2024-09-17 LAB
BACTERIA FLD CULT: NORMAL
GRAM STN SPEC: NORMAL
GRAM STN SPEC: NORMAL

## 2024-09-17 NOTE — TELEPHONE ENCOUNTER
Patient called the office today asking for advice from you regarding Dr. Mills suggestion decreasing eliquis due to blood pooling around her left knee joint.  Please advise, thanks  Call back [phone]    Called patient with response, she expressed understanding and will let us know if this helps.

## 2024-10-07 ENCOUNTER — TELEPHONE (OUTPATIENT)
Dept: PRIMARY CARE | Facility: CLINIC | Age: 79
End: 2024-10-07
Payer: MEDICARE

## 2024-10-09 ENCOUNTER — CLINICAL SUPPORT (OUTPATIENT)
Dept: PRIMARY CARE | Facility: CLINIC | Age: 79
End: 2024-10-09
Payer: MEDICARE

## 2024-10-09 ENCOUNTER — TELEPHONE (OUTPATIENT)
Dept: PRIMARY CARE | Facility: CLINIC | Age: 79
End: 2024-10-09

## 2024-10-09 VITALS — TEMPERATURE: 97.2 F

## 2024-10-09 DIAGNOSIS — Z23 ENCOUNTER FOR IMMUNIZATION: ICD-10-CM

## 2024-10-09 PROCEDURE — G0008 ADMIN INFLUENZA VIRUS VAC: HCPCS | Performed by: INTERNAL MEDICINE

## 2024-10-09 NOTE — TELEPHONE ENCOUNTER
Pt asking if she can get the RSV vaccine. She states she was told last year that she shoud not and was wondering if this applies to this year as well. Thank you!

## 2024-10-20 NOTE — PROGRESS NOTES
Subjective      Chief Complaint   Patient presents with    Not feeling well          She has a history of atrial fibrillation on Eliquis for this.  She does have a history of hypertension and hyperlipidemia.  She does have a left bundle branch block pattern on EKG.  She felt poor last week and the heart rate was normal and winded the legs did not swell.  Was sleeping well and no PND or orthopnea.  Did not check her BP.  NO fevers or chills.  Did not feel the heart was racing.  She did get a covid vaccine last week  Is feeling well now  The BP is doing well           Review of Systems   Constitutional: Negative. Negative for chills and fever.   HENT: Negative.     Eyes: Negative.    Respiratory: Negative.  Negative for cough.    Endocrine: Negative.    Skin: Negative.    Musculoskeletal: Negative.  Negative for falls.   Gastrointestinal: Negative.    Genitourinary: Negative.    Neurological: Negative.    All other systems reviewed and are negative.       Past Surgical History:   Procedure Laterality Date    CATARACT EXTRACTION Bilateral 2015    COLONOSCOPY  2008    COLONOSCOPY  2013    COLONOSCOPY  03/17/2018    COLPORRHAPHY  2021    A+P    ESOPHAGOGASTRODUODENOSCOPY  2008    FOOT SURGERY  2007    FOOT SURGERY Left 2019    [facility]    HAND SURGERY Left 2005    carpometacarpo arthroplasty    LUMBAR SPINE SURGERY  1994    TOTAL ABDOMINAL HYSTERECTOMY W/ BILATERAL SALPINGOOPHORECTOMY  1980    TOTAL KNEE ARTHROPLASTY Left     August 9, 2023    TOTAL KNEE ARTHROPLASTY Right 06/05/2024    Dr. Brendan Mills    VARICOSE VEIN SURGERY  2016    Dr. Mohseni        Active Ambulatory Problems     Diagnosis Date Noted    Disorder of pancreatic duct ([facility]) 12/06/2022    Paroxysmal atrial fibrillation (Multi) 09/06/2022    Paroxysmal A-fib (Multi) 09/06/2023    Atrial paroxysmal tachycardia ([facility]-HCC) 09/06/2023    Cervical spondylosis 09/06/2023    Gastroesophageal reflux disease with esophagitis 09/06/2023    Cystocele, midline  09/06/2023    Generalized osteoarthritis 01/30/2023    Hypertensive disorder 09/06/2023    Essential hypertension 09/06/2023    Prediabetes 09/06/2023    History of atrial fibrillation 09/06/2023    Hyperlipidemia 09/06/2023    Hypothyroidism 09/06/2023    Left bundle branch block (LBBB) 09/06/2023    Macrocytosis without anemia 09/06/2023    Mitral valve prolapse 09/06/2023    Primary osteoarthritis of right hip 09/06/2023    Rectocele 09/06/2023    Female proctocele without uterine prolapse 09/06/2023    Spondylosis of lumbosacral joint without myelopathy 06/13/2022    Sacroiliitis ([facility]) 09/12/2022    Rhinitis 01/22/2024    Raynaud's syndrome without gangrene 06/22/2023    Urinary tract infection with hematuria 01/22/2024    Intervertebral disc disorder with radiculopathy of lumbosacral region 07/29/2022    Decreased estrogen level 11/08/2022    Edema 05/25/2023    Edema of upper extremity 01/22/2024    Displacement of lumbar intervertebral disc with radiculopathy 07/29/2022    Cyanosis 05/25/2023    Pruritic disorder 01/22/2024    Blepharitis of both eyes 01/22/2016    Lateral epicondylitis of right elbow 05/11/2018    Allergic contact dermatitis due to plant 01/30/2023    Acrocyanosis (CMS-HCC) 01/22/2024    Unspecified visual disturbance 12/13/2022    Polyarthritis, unspecified 04/06/2023    Spondylosis of lumbar region without myelopathy or radiculopathy 06/13/2022    Other specified peripheral vascular diseases 05/25/2023    Other specified soft tissue disorders 01/30/2023    Other specified diseases of blood and blood-forming organs 09/27/2022    Other long term (current) drug therapy 09/06/2022    Other infective (teno)synovitis, right hand 01/30/2023    Other fatigue 09/06/2022    Muscle weakness (generalized) 09/06/2022    Low back pain, unspecified 08/07/2023    Inflammatory polyarthropathy (Multi) 01/30/2023    Idiopathic gout, right hand 01/30/2023    Hormone replacement therapy 01/30/2023     Abnormal levels of other serum enzymes 09/27/2022    Pneumonia due to infectious organism 01/22/2024    Stage 3a chronic kidney disease (Multi) 05/21/2024    Pulmonary venous congestion 05/21/2024    Allergy status to analgesic agent 01/30/2023    Preop cardiovascular exam 05/21/2024     Resolved Ambulatory Problems     Diagnosis Date Noted    Urinary frequency 09/06/2023    Atherosclerosis of both carotid arteries 09/06/2023    Cervical spine arthritis 09/06/2023    Elevated alkaline phosphatase level 09/06/2023    Near syncope 09/06/2023    Flexor tenosynovitis of finger 09/06/2023    Nonvenomous insect bite of multiple sites 01/22/2024    Neck pain 01/22/2024    Epiphora due to insufficient drainage of both sides 01/28/2016    Dizziness and giddiness 12/13/2022    Cellulitis 01/30/2023    Allergic rhinitis due to pollen 01/22/2024    Allergic rhinitis due to animal hair and dander 01/22/2024    Allergic conjunctivitis 01/22/2024    Female cystocele 09/06/2023    Unspecified abdominal pain 09/06/2022    Pain in unspecified hip 01/18/2023    Nausea 09/06/2022    Contact with and (suspected) exposure to covid-19 09/06/2022    Fever 01/22/2024    Intermittent chest pain 05/21/2024    Frequency of urination 09/06/2022     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    Atrial fibrillation (Multi) 09/06/2023    Chronic reflux esophagitis 09/06/2023    Degenerative joint disease involving multiple joints 09/06/2023    Left bundle branch block 09/06/2023    Lower urinary tract infectious disease 01/22/2024        Visit Vitals  /70   Pulse 60   Wt 60.3 kg (133 lb)   SpO2 97%   BMI 20.83 kg/m²   OB Status Postmenopausal   Smoking Status Never   BSA 1.69 m²        Objective     Constitutional:       Appearance: Healthy appearance.   Neck:      Vascular: No JVR.   Pulmonary:      Effort: Pulmonary effort is normal.      Breath sounds: Normal breath sounds.   Cardiovascular:       "PMI at left midclavicular line. Normal rate. Regular rhythm. Normal S1. Normal S2.       Murmurs: There is a grade 1/6 holosystolic murmur.      No gallop.  No click. No rub.   Pulses:     Intact distal pulses.   Abdominal:      Palpations: Abdomen is soft.   Musculoskeletal: Normal range of motion. Skin:     General: Skin is warm and dry.   Neurological:      General: No focal deficit present.            Lab Review:         Lab Results   Component Value Date    CHOL 144 07/25/2024    CHOL 130 (L) 01/24/2024    CHOL 140 07/24/2023     Lab Results   Component Value Date    HDL 52.0 07/25/2024    HDL 36.0 (L) 01/24/2024    HDL 48 (L) 07/24/2023     Lab Results   Component Value Date    LDLCALC 71 07/25/2024    LDLCALC 69 01/24/2024    LDLCALC 74 07/24/2023     Lab Results   Component Value Date    TRIG 105 07/25/2024    TRIG 126 01/24/2024    TRIG 91 07/24/2023     No components found for: \"CHOLHDL\"     Assessment/Plan     Paroxysmal atrial fibrillation (Multi)  She did not feel well last week and did get a COVID shot.  No symptoms of CHF and no angina.  She has not felt the afib.  Would like to get a cxr just to make sure no changes in the lungs.      Hypertensive disorder  The BP is doing well    Hyperlipidemia  Is doing well     "

## 2024-10-21 ENCOUNTER — OFFICE VISIT (OUTPATIENT)
Dept: CARDIOLOGY | Facility: CLINIC | Age: 79
End: 2024-10-21
Payer: MEDICARE

## 2024-10-21 VITALS
BODY MASS INDEX: 20.83 KG/M2 | WEIGHT: 133 LBS | OXYGEN SATURATION: 97 % | SYSTOLIC BLOOD PRESSURE: 128 MMHG | DIASTOLIC BLOOD PRESSURE: 70 MMHG | HEART RATE: 60 BPM

## 2024-10-21 DIAGNOSIS — R06.02 SOB (SHORTNESS OF BREATH): ICD-10-CM

## 2024-10-21 DIAGNOSIS — I10 PRIMARY HYPERTENSION: ICD-10-CM

## 2024-10-21 DIAGNOSIS — I48.0 PAROXYSMAL ATRIAL FIBRILLATION (MULTI): Primary | ICD-10-CM

## 2024-10-21 DIAGNOSIS — E78.2 MIXED HYPERLIPIDEMIA: ICD-10-CM

## 2024-10-21 PROCEDURE — 3078F DIAST BP <80 MM HG: CPT | Performed by: INTERNAL MEDICINE

## 2024-10-21 PROCEDURE — 1159F MED LIST DOCD IN RCRD: CPT | Performed by: INTERNAL MEDICINE

## 2024-10-21 PROCEDURE — 1036F TOBACCO NON-USER: CPT | Performed by: INTERNAL MEDICINE

## 2024-10-21 PROCEDURE — 1126F AMNT PAIN NOTED NONE PRSNT: CPT | Performed by: INTERNAL MEDICINE

## 2024-10-21 PROCEDURE — 99213 OFFICE O/P EST LOW 20 MIN: CPT | Performed by: INTERNAL MEDICINE

## 2024-10-21 PROCEDURE — 3074F SYST BP LT 130 MM HG: CPT | Performed by: INTERNAL MEDICINE

## 2024-10-21 ASSESSMENT — ENCOUNTER SYMPTOMS
LOSS OF SENSATION IN FEET: 0
FALLS: 0
GASTROINTESTINAL NEGATIVE: 1
OCCASIONAL FEELINGS OF UNSTEADINESS: 1
DEPRESSION: 0
CONSTITUTIONAL NEGATIVE: 1
MUSCULOSKELETAL NEGATIVE: 1
FEVER: 0
ENDOCRINE NEGATIVE: 1
EYES NEGATIVE: 1
RESPIRATORY NEGATIVE: 1
NEUROLOGICAL NEGATIVE: 1
CHILLS: 0
COUGH: 0

## 2024-10-21 ASSESSMENT — PATIENT HEALTH QUESTIONNAIRE - PHQ9
2. FEELING DOWN, DEPRESSED OR HOPELESS: NOT AT ALL
1. LITTLE INTEREST OR PLEASURE IN DOING THINGS: NOT AT ALL
SUM OF ALL RESPONSES TO PHQ9 QUESTIONS 1 AND 2: 0

## 2024-10-21 ASSESSMENT — PAIN SCALES - GENERAL: PAINLEVEL_OUTOF10: 0-NO PAIN

## 2024-10-21 NOTE — ASSESSMENT & PLAN NOTE
She did not feel well last week and did get a COVID shot.  No symptoms of CHF and no angina.  She has not felt the afib.  Would like to get a cxr just to make sure no changes in the lungs.

## 2024-10-22 ENCOUNTER — HOSPITAL ENCOUNTER (OUTPATIENT)
Dept: RADIOLOGY | Facility: CLINIC | Age: 79
Discharge: HOME | End: 2024-10-22
Payer: MEDICARE

## 2024-10-22 DIAGNOSIS — R06.02 SOB (SHORTNESS OF BREATH): ICD-10-CM

## 2024-10-22 PROCEDURE — 71046 X-RAY EXAM CHEST 2 VIEWS: CPT | Performed by: RADIOLOGY

## 2024-10-22 PROCEDURE — 71046 X-RAY EXAM CHEST 2 VIEWS: CPT

## 2024-10-28 ENCOUNTER — TELEPHONE (OUTPATIENT)
Dept: CARDIOLOGY | Facility: CLINIC | Age: 79
End: 2024-10-28
Payer: MEDICARE

## 2024-10-28 ENCOUNTER — TELEPHONE (OUTPATIENT)
Dept: PRIMARY CARE | Facility: CLINIC | Age: 79
End: 2024-10-28
Payer: MEDICARE

## 2024-11-13 ENCOUNTER — APPOINTMENT (OUTPATIENT)
Dept: CARDIOLOGY | Facility: CLINIC | Age: 79
End: 2024-11-13
Payer: MEDICARE

## 2024-12-30 DIAGNOSIS — E03.9 ACQUIRED HYPOTHYROIDISM: ICD-10-CM

## 2024-12-30 RX ORDER — LEVOTHYROXINE SODIUM 112 UG/1
112 TABLET ORAL DAILY
Qty: 90 TABLET | Refills: 3 | Status: SHIPPED | OUTPATIENT
Start: 2024-12-30 | End: 2025-12-30

## 2024-12-31 ENCOUNTER — TELEPHONE (OUTPATIENT)
Dept: PRIMARY CARE | Facility: CLINIC | Age: 79
End: 2024-12-31
Payer: MEDICARE

## 2024-12-31 DIAGNOSIS — J30.1 ALLERGIC RHINITIS DUE TO POLLEN, UNSPECIFIED SEASONALITY: ICD-10-CM

## 2024-12-31 DIAGNOSIS — J30.89 ALLERGIC RHINITIS DUE TO OTHER ALLERGIC TRIGGER, UNSPECIFIED SEASONALITY: ICD-10-CM

## 2024-12-31 DIAGNOSIS — E03.9 ACQUIRED HYPOTHYROIDISM: ICD-10-CM

## 2024-12-31 DIAGNOSIS — E78.5 HYPERLIPIDEMIA, UNSPECIFIED: ICD-10-CM

## 2024-12-31 DIAGNOSIS — I48.0 PAROXYSMAL ATRIAL FIBRILLATION (MULTI): ICD-10-CM

## 2024-12-31 DIAGNOSIS — I48.91 UNSPECIFIED ATRIAL FIBRILLATION (MULTI): ICD-10-CM

## 2024-12-31 RX ORDER — LEVOTHYROXINE SODIUM 112 UG/1
112 TABLET ORAL DAILY
Qty: 90 TABLET | Refills: 3 | OUTPATIENT
Start: 2024-12-31 | End: 2025-12-31

## 2024-12-31 RX ORDER — LOSARTAN POTASSIUM 25 MG/1
TABLET ORAL
Qty: 90 TABLET | Refills: 3 | OUTPATIENT
Start: 2024-12-31

## 2024-12-31 RX ORDER — ATORVASTATIN CALCIUM 40 MG/1
40 TABLET, FILM COATED ORAL DAILY
Qty: 90 TABLET | Refills: 3 | Status: SHIPPED | OUTPATIENT
Start: 2024-12-31

## 2025-01-07 RX ORDER — IPRATROPIUM BROMIDE 42 UG/1
2 SPRAY, METERED NASAL 3 TIMES DAILY
Qty: 15 ML | Refills: 11 | Status: SHIPPED | OUTPATIENT
Start: 2025-01-07

## 2025-01-15 DIAGNOSIS — I48.0 PAROXYSMAL ATRIAL FIBRILLATION (MULTI): ICD-10-CM

## 2025-01-15 DIAGNOSIS — E03.9 ACQUIRED HYPOTHYROIDISM: ICD-10-CM

## 2025-01-15 DIAGNOSIS — I10 ESSENTIAL HYPERTENSION: ICD-10-CM

## 2025-01-15 RX ORDER — LEVOTHYROXINE SODIUM 112 UG/1
112 TABLET ORAL DAILY
Qty: 90 TABLET | Refills: 3 | Status: SHIPPED | OUTPATIENT
Start: 2025-01-15 | End: 2026-01-15

## 2025-01-15 RX ORDER — LOSARTAN POTASSIUM 25 MG/1
25 TABLET ORAL DAILY
Qty: 90 TABLET | Refills: 3 | Status: SHIPPED | OUTPATIENT
Start: 2025-01-15

## 2025-01-29 LAB
25(OH)D3+25(OH)D2 SERPL-MCNC: 36 NG/ML (ref 30–100)
ALBUMIN SERPL-MCNC: 4.1 G/DL (ref 3.6–5.1)
ALP SERPL-CCNC: 105 U/L (ref 37–153)
ALT SERPL-CCNC: 16 U/L (ref 6–29)
ANION GAP SERPL CALCULATED.4IONS-SCNC: 9 MMOL/L (CALC) (ref 7–17)
AST SERPL-CCNC: 18 U/L (ref 10–35)
BASOPHILS # BLD AUTO: 63 CELLS/UL (ref 0–200)
BASOPHILS NFR BLD AUTO: 1 %
BILIRUB SERPL-MCNC: 0.5 MG/DL (ref 0.2–1.2)
BUN SERPL-MCNC: 19 MG/DL (ref 7–25)
CALCIUM SERPL-MCNC: 9.3 MG/DL (ref 8.6–10.4)
CHLORIDE SERPL-SCNC: 107 MMOL/L (ref 98–110)
CHOLEST SERPL-MCNC: 146 MG/DL
CHOLEST/HDLC SERPL: 2.6 (CALC)
CO2 SERPL-SCNC: 25 MMOL/L (ref 20–32)
CREAT SERPL-MCNC: 0.87 MG/DL (ref 0.6–0.95)
EGFRCR SERPLBLD CKD-EPI 2021: 67 ML/MIN/1.73M2
EOSINOPHIL # BLD AUTO: 265 CELLS/UL (ref 15–500)
EOSINOPHIL NFR BLD AUTO: 4.2 %
ERYTHROCYTE [DISTWIDTH] IN BLOOD BY AUTOMATED COUNT: 13.7 % (ref 11–15)
EST. AVERAGE GLUCOSE BLD GHB EST-MCNC: 123 MG/DL
EST. AVERAGE GLUCOSE BLD GHB EST-SCNC: 6.8 MMOL/L
GLUCOSE SERPL-MCNC: 97 MG/DL (ref 65–99)
HBA1C MFR BLD: 5.9 % OF TOTAL HGB
HCT VFR BLD AUTO: 44.5 % (ref 35–45)
HDLC SERPL-MCNC: 56 MG/DL
HGB BLD-MCNC: 14.3 G/DL (ref 11.7–15.5)
LDLC SERPL CALC-MCNC: 72 MG/DL (CALC)
LYMPHOCYTES # BLD AUTO: 1966 CELLS/UL (ref 850–3900)
LYMPHOCYTES NFR BLD AUTO: 31.2 %
MCH RBC QN AUTO: 31 PG (ref 27–33)
MCHC RBC AUTO-ENTMCNC: 32.1 G/DL (ref 32–36)
MCV RBC AUTO: 96.3 FL (ref 80–100)
MONOCYTES # BLD AUTO: 636 CELLS/UL (ref 200–950)
MONOCYTES NFR BLD AUTO: 10.1 %
NEUTROPHILS # BLD AUTO: 3371 CELLS/UL (ref 1500–7800)
NEUTROPHILS NFR BLD AUTO: 53.5 %
NONHDLC SERPL-MCNC: 90 MG/DL (CALC)
PLATELET # BLD AUTO: 264 THOUSAND/UL (ref 140–400)
PMV BLD REES-ECKER: 10 FL (ref 7.5–12.5)
POTASSIUM SERPL-SCNC: 4.6 MMOL/L (ref 3.5–5.3)
PROT SERPL-MCNC: 6.9 G/DL (ref 6.1–8.1)
RBC # BLD AUTO: 4.62 MILLION/UL (ref 3.8–5.1)
SODIUM SERPL-SCNC: 141 MMOL/L (ref 135–146)
T4 FREE SERPL-MCNC: 1.7 NG/DL (ref 0.8–1.8)
TRIGL SERPL-MCNC: 92 MG/DL
TSH SERPL-ACNC: 0.2 MIU/L (ref 0.4–4.5)
WBC # BLD AUTO: 6.3 THOUSAND/UL (ref 3.8–10.8)

## 2025-02-03 ENCOUNTER — OFFICE VISIT (OUTPATIENT)
Dept: CARDIOLOGY | Facility: CLINIC | Age: 80
End: 2025-02-03
Payer: MEDICARE

## 2025-02-03 VITALS
HEART RATE: 68 BPM | WEIGHT: 137 LBS | DIASTOLIC BLOOD PRESSURE: 66 MMHG | SYSTOLIC BLOOD PRESSURE: 120 MMHG | BODY MASS INDEX: 21.46 KG/M2

## 2025-02-03 DIAGNOSIS — I34.1 MITRAL VALVE PROLAPSE: ICD-10-CM

## 2025-02-03 DIAGNOSIS — I10 ESSENTIAL HYPERTENSION: ICD-10-CM

## 2025-02-03 DIAGNOSIS — E78.2 MIXED HYPERLIPIDEMIA: ICD-10-CM

## 2025-02-03 DIAGNOSIS — I48.0 PAROXYSMAL A-FIB (MULTI): Primary | ICD-10-CM

## 2025-02-03 PROCEDURE — 1159F MED LIST DOCD IN RCRD: CPT | Performed by: INTERNAL MEDICINE

## 2025-02-03 PROCEDURE — 99214 OFFICE O/P EST MOD 30 MIN: CPT | Performed by: INTERNAL MEDICINE

## 2025-02-03 PROCEDURE — 1036F TOBACCO NON-USER: CPT | Performed by: INTERNAL MEDICINE

## 2025-02-03 PROCEDURE — 3078F DIAST BP <80 MM HG: CPT | Performed by: INTERNAL MEDICINE

## 2025-02-03 PROCEDURE — 3074F SYST BP LT 130 MM HG: CPT | Performed by: INTERNAL MEDICINE

## 2025-02-03 PROCEDURE — 1126F AMNT PAIN NOTED NONE PRSNT: CPT | Performed by: INTERNAL MEDICINE

## 2025-02-03 ASSESSMENT — ENCOUNTER SYMPTOMS
WEAKNESS: 0
DEPRESSION: 0
WEIGHT GAIN: 0
DYSPNEA ON EXERTION: 1
ORTHOPNEA: 0
LOSS OF SENSATION IN FEET: 0
COUGH: 0
DIZZINESS: 0
WHEEZING: 0
NEAR-SYNCOPE: 0
MYALGIAS: 0
CLAUDICATION: 0
PALPITATIONS: 0
FEVER: 0
DIAPHORESIS: 0
SYNCOPE: 0
SHORTNESS OF BREATH: 0
PND: 0
IRREGULAR HEARTBEAT: 0
WEIGHT LOSS: 0
OCCASIONAL FEELINGS OF UNSTEADINESS: 0

## 2025-02-03 ASSESSMENT — PAIN SCALES - GENERAL: PAINLEVEL_OUTOF10: 0-NO PAIN

## 2025-02-03 NOTE — ASSESSMENT & PLAN NOTE
Lipids are excellent. Lifestyle modifications were discussed. I advocated for mediterranean and plant based eating. We also discussed exercise. 150 minutes a week of moderate intensity exercise is recommended per our ACC/AHA guidelines

## 2025-02-03 NOTE — PROGRESS NOTES
Subjective      Chief Complaint   Patient presents with    Follow-up        79-year-old female with history of atrial fibrillation on oral anticoagulation in the form of Eliquis.  She also has hypertension and hyperlipidemia.  She used to follow with Dr. Golden.  She has been on amiodarone in the past, this was stopped secondary to new exercise intolerance and some interstitial changes seen on chest CT as well as an impaired DLCO on PFTs.  She has seen pulmonary for this. She still has some dyspnea on exertion, better but not cheli. Her last echo was 1 yr ago, EF 45-50%, trivial valve disease.            Review of Systems   Constitutional: Negative for diaphoresis, fever, weight gain and weight loss.   Eyes:  Negative for visual disturbance.   Cardiovascular:  Positive for dyspnea on exertion. Negative for chest pain, claudication, irregular heartbeat, leg swelling, near-syncope, orthopnea, palpitations, paroxysmal nocturnal dyspnea and syncope.   Respiratory:  Negative for cough, shortness of breath and wheezing.    Musculoskeletal:  Negative for muscle weakness and myalgias.   Neurological:  Negative for dizziness and weakness.   All other systems reviewed and are negative.       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    Allergic conjunctivitis 01/22/2024    Atherosclerosis of both carotid arteries 09/06/2023    Atrial fibrillation (Multi) 09/06/2023    Cellulitis 01/30/2023    Cervical spine arthritis 09/06/2023    Cervical spondylosis 09/06/2023    Chronic reflux esophagitis 09/06/2023    Contact with and (suspected) exposure to covid-19 09/06/2022    Degenerative joint disease involving multiple joints 09/06/2023    Dizziness and giddiness 12/13/2022    Epiphora due to insufficient drainage of both sides 01/28/2016    Essential hypertension 09/06/2023    Female cystocele 09/06/2023    Flexor tenosynovitis of finger 09/06/2023    Frequency of urination 09/06/2022     Hyperlipidemia 09/06/2023    Hypothyroidism 09/06/2023    Intermittent chest pain 05/21/2024    Left bundle branch block 09/06/2023    Lower urinary tract infectious disease 01/22/2024    Macrocytosis without anemia 09/06/2023    Mitral valve prolapse 09/06/2023    Prediabetes 09/06/2023    Primary osteoarthritis of right hip 09/06/2023    Unspecified abdominal pain 09/06/2022        Past Surgical History:   Procedure Laterality Date    CATARACT EXTRACTION Bilateral 2015    COLONOSCOPY  2008    COLONOSCOPY  2013    COLONOSCOPY  03/17/2018    COLPORRHAPHY  2021    A+P    ESOPHAGOGASTRODUODENOSCOPY  2008    FOOT SURGERY  2007    FOOT SURGERY Left 2019    [facility]    HAND SURGERY Left 2005    carpometacarpo arthroplasty    LUMBAR SPINE SURGERY  1994    TOTAL ABDOMINAL HYSTERECTOMY W/ BILATERAL SALPINGOOPHORECTOMY  1980    TOTAL KNEE ARTHROPLASTY Left     August 9, 2023    TOTAL KNEE ARTHROPLASTY Right 06/05/2024    Dr. Brendan Mills    VARICOSE VEIN SURGERY  2016    Dr. Mohseni        Social History     Socioeconomic History    Marital status:      Spouse name: Not on file    Number of children: Not on file    Years of education: Not on file    Highest education level: Not on file   Occupational History    Not on file   Tobacco Use    Smoking status: Never     Passive exposure: Never    Smokeless tobacco: Never   Vaping Use    Vaping status: Never Used   Substance and Sexual Activity    Alcohol use: Never    Drug use: Never    Sexual activity: Defer   Other Topics Concern    Not on file   Social History Narrative    Not on file     Social Drivers of Health     Financial Resource Strain: Not on file   Food Insecurity: Not on file   Transportation Needs: No Transportation Needs (6/20/2024)    OASIS : Transportation     Lack of Transportation (Medical): No     Lack of Transportation (Non-Medical): No     Patient Unable or Declines to Respond: No   Physical Activity: Not on file   Stress: Not on file   Social  Connections: Feeling Socially Integrated (6/20/2024)    OASIS : Social Isolation     Frequency of experiencing loneliness or isolation: Never   Intimate Partner Violence: Not on file   Housing Stability: Not on file        Family History   Problem Relation Name Age of Onset    Colon cancer Mother      COPD Father Champ     Lung disease Father Champ     Other (Calcified heart valves) Sister      Colon cancer Brother          OBJECTIVE:    Vitals:    02/03/25 1011   BP: 120/66   Pulse: 68        Vitals reviewed.   Constitutional:       Appearance: Normal and healthy appearance. Not in distress.   Pulmonary:      Effort: Pulmonary effort is normal.      Breath sounds: Normal breath sounds.   Cardiovascular:      Normal rate. Regular rhythm. Normal S1. Normal S2.       Murmurs: There is a grade 2/6 holosystolic murmur, radiating to the apex.      No gallop.  No click.   Pulses:     Intact distal pulses.   Edema:     Peripheral edema absent.   Skin:     General: Skin is warm and dry.   Neurological:      General: No focal deficit present.          Lab Review:   Lab Results   Component Value Date     01/28/2025    K 4.6 01/28/2025     01/28/2025    CO2 25 01/28/2025    BUN 19 01/28/2025    CREATININE 0.87 01/28/2025    GLUCOSE 97 01/28/2025    CALCIUM 9.3 01/28/2025     Lab Results   Component Value Date    CHOL 146 01/28/2025    TRIG 92 01/28/2025    HDL 56 01/28/2025       Lab Results   Component Value Date    LDLCALC 72 01/28/2025        Mitral valve prolapse  Mild MR seen 1/2024. Repeat echo 1/2026    Paroxysmal A-fib (Multi)  NSR today. Will continue Eliquis for oral AC    Essential hypertension  Controlled. Continue losartan. Advised to check at home    Hyperlipidemia  Lipids are excellent. Lifestyle modifications were discussed. I advocated for mediterranean and plant based eating. We also discussed exercise. 150 minutes a week of moderate intensity exercise is recommended per our ACC/AHA  guidelines

## 2025-02-07 ENCOUNTER — OFFICE VISIT (OUTPATIENT)
Dept: PRIMARY CARE | Facility: CLINIC | Age: 80
End: 2025-02-07
Payer: MEDICARE

## 2025-02-07 VITALS
HEIGHT: 67 IN | BODY MASS INDEX: 21.97 KG/M2 | DIASTOLIC BLOOD PRESSURE: 74 MMHG | SYSTOLIC BLOOD PRESSURE: 132 MMHG | OXYGEN SATURATION: 90 % | HEART RATE: 70 BPM | WEIGHT: 140 LBS | TEMPERATURE: 96.5 F

## 2025-02-07 DIAGNOSIS — R73.9 HYPERGLYCEMIA: ICD-10-CM

## 2025-02-07 DIAGNOSIS — M81.0 OSTEOPOROSIS: ICD-10-CM

## 2025-02-07 DIAGNOSIS — Z00.00 ANNUAL PHYSICAL EXAM: Primary | ICD-10-CM

## 2025-02-07 DIAGNOSIS — I10 ESSENTIAL HYPERTENSION: ICD-10-CM

## 2025-02-07 DIAGNOSIS — E55.9 VITAMIN D DEFICIENCY: ICD-10-CM

## 2025-02-07 DIAGNOSIS — I48.0 PAROXYSMAL A-FIB (MULTI): ICD-10-CM

## 2025-02-07 DIAGNOSIS — R73.03 PREDIABETES: ICD-10-CM

## 2025-02-07 DIAGNOSIS — D75.89 MACROCYTOSIS WITHOUT ANEMIA: ICD-10-CM

## 2025-02-07 DIAGNOSIS — Z12.31 OTHER SCREENING MAMMOGRAM: ICD-10-CM

## 2025-02-07 DIAGNOSIS — M15.9 GENERALIZED OSTEOARTHRITIS: ICD-10-CM

## 2025-02-07 DIAGNOSIS — M47.816 SPONDYLOSIS OF LUMBAR REGION WITHOUT MYELOPATHY OR RADICULOPATHY: ICD-10-CM

## 2025-02-07 DIAGNOSIS — E78.2 MIXED HYPERLIPIDEMIA: ICD-10-CM

## 2025-02-07 DIAGNOSIS — E03.9 ACQUIRED HYPOTHYROIDISM: ICD-10-CM

## 2025-02-07 PROBLEM — N18.31 STAGE 3A CHRONIC KIDNEY DISEASE (MULTI): Status: RESOLVED | Noted: 2024-05-21 | Resolved: 2025-02-07

## 2025-02-07 PROBLEM — M06.4 INFLAMMATORY POLYARTHROPATHY (MULTI): Status: RESOLVED | Noted: 2023-01-30 | Resolved: 2025-02-07

## 2025-02-07 PROCEDURE — 1126F AMNT PAIN NOTED NONE PRSNT: CPT | Performed by: INTERNAL MEDICINE

## 2025-02-07 PROCEDURE — G0439 PPPS, SUBSEQ VISIT: HCPCS | Performed by: INTERNAL MEDICINE

## 2025-02-07 PROCEDURE — 99215 OFFICE O/P EST HI 40 MIN: CPT | Performed by: INTERNAL MEDICINE

## 2025-02-07 PROCEDURE — 3075F SYST BP GE 130 - 139MM HG: CPT | Performed by: INTERNAL MEDICINE

## 2025-02-07 PROCEDURE — 1159F MED LIST DOCD IN RCRD: CPT | Performed by: INTERNAL MEDICINE

## 2025-02-07 PROCEDURE — 99213 OFFICE O/P EST LOW 20 MIN: CPT | Performed by: INTERNAL MEDICINE

## 2025-02-07 PROCEDURE — 1036F TOBACCO NON-USER: CPT | Performed by: INTERNAL MEDICINE

## 2025-02-07 PROCEDURE — 1158F ADVNC CARE PLAN TLK DOCD: CPT | Performed by: INTERNAL MEDICINE

## 2025-02-07 PROCEDURE — 1160F RVW MEDS BY RX/DR IN RCRD: CPT | Performed by: INTERNAL MEDICINE

## 2025-02-07 PROCEDURE — 3078F DIAST BP <80 MM HG: CPT | Performed by: INTERNAL MEDICINE

## 2025-02-07 PROCEDURE — 1123F ACP DISCUSS/DSCN MKR DOCD: CPT | Performed by: INTERNAL MEDICINE

## 2025-02-07 PROCEDURE — 99213 OFFICE O/P EST LOW 20 MIN: CPT | Mod: 25 | Performed by: INTERNAL MEDICINE

## 2025-02-07 RX ORDER — LEVOTHYROXINE SODIUM 100 UG/1
100 TABLET ORAL DAILY
Qty: 90 TABLET | Refills: 3 | Status: SHIPPED | OUTPATIENT
Start: 2025-02-07 | End: 2026-02-07

## 2025-02-07 ASSESSMENT — LIFESTYLE VARIABLES
HOW OFTEN DURING THE LAST YEAR HAVE YOU HAD A FEELING OF GUILT OR REMORSE AFTER DRINKING: NEVER
HAS A RELATIVE, FRIEND, DOCTOR, OR ANOTHER HEALTH PROFESSIONAL EXPRESSED CONCERN ABOUT YOUR DRINKING OR SUGGESTED YOU CUT DOWN: NO
HAS A RELATIVE, FRIEND, DOCTOR, OR ANOTHER HEALTH PROFESSIONAL EXPRESSED CONCERN ABOUT YOUR DRINKING OR SUGGESTED YOU CUT DOWN: NO
HOW OFTEN DURING THE LAST YEAR HAVE YOU BEEN UNABLE TO REMEMBER WHAT HAPPENED THE NIGHT BEFORE BECAUSE YOU HAD BEEN DRINKING: NEVER
HAVE YOU OR SOMEONE ELSE BEEN INJURED AS A RESULT OF YOUR DRINKING: NO
AUDIT-C TOTAL SCORE: 0
HOW MANY STANDARD DRINKS CONTAINING ALCOHOL DO YOU HAVE ON A TYPICAL DAY: PATIENT DOES NOT DRINK
HOW OFTEN DO YOU HAVE SIX OR MORE DRINKS ON ONE OCCASION: NEVER
HOW OFTEN DURING THE LAST YEAR HAVE YOU FOUND THAT YOU WERE NOT ABLE TO STOP DRINKING ONCE YOU HAD STARTED: NEVER
HOW OFTEN DURING THE LAST YEAR HAVE YOU BEEN UNABLE TO REMEMBER WHAT HAPPENED THE NIGHT BEFORE BECAUSE YOU HAD BEEN DRINKING: NEVER
AUDIT TOTAL SCORE: 0
HOW MANY STANDARD DRINKS CONTAINING ALCOHOL DO YOU HAVE ON A TYPICAL DAY: PATIENT DOES NOT DRINK
HOW OFTEN DURING THE LAST YEAR HAVE YOU HAD A FEELING OF GUILT OR REMORSE AFTER DRINKING: NEVER
HOW OFTEN DURING THE LAST YEAR HAVE YOU NEEDED AN ALCOHOLIC DRINK FIRST THING IN THE MORNING TO GET YOURSELF GOING AFTER A NIGHT OF HEAVY DRINKING: NEVER
HOW OFTEN DURING THE LAST YEAR HAVE YOU FAILED TO DO WHAT WAS NORMALLY EXPECTED FROM YOU BECAUSE OF DRINKING: NEVER
SKIP TO QUESTIONS 9-10: 1
AUDIT TOTAL SCORE: 0
HOW OFTEN DO YOU HAVE SIX OR MORE DRINKS ON ONE OCCASION: NEVER
HOW OFTEN DO YOU HAVE A DRINK CONTAINING ALCOHOL: NEVER
SKIP TO QUESTIONS 9-10: 1
HOW OFTEN DURING THE LAST YEAR HAVE YOU FAILED TO DO WHAT WAS NORMALLY EXPECTED FROM YOU BECAUSE OF DRINKING: NEVER
AUDIT-C TOTAL SCORE: 0
HOW OFTEN DURING THE LAST YEAR HAVE YOU FOUND THAT YOU WERE NOT ABLE TO STOP DRINKING ONCE YOU HAD STARTED: NEVER
HAVE YOU OR SOMEONE ELSE BEEN INJURED AS A RESULT OF YOUR DRINKING: NO
HOW OFTEN DURING THE LAST YEAR HAVE YOU NEEDED AN ALCOHOLIC DRINK FIRST THING IN THE MORNING TO GET YOURSELF GOING AFTER A NIGHT OF HEAVY DRINKING: NEVER
HOW OFTEN DO YOU HAVE A DRINK CONTAINING ALCOHOL: NEVER

## 2025-02-07 ASSESSMENT — ENCOUNTER SYMPTOMS
LOSS OF SENSATION IN FEET: 0
ALLERGIC/IMMUNOLOGIC NEGATIVE: 1
NEUROLOGICAL NEGATIVE: 1
PSYCHIATRIC NEGATIVE: 1
GASTROINTESTINAL NEGATIVE: 1
MUSCULOSKELETAL NEGATIVE: 1
ENDOCRINE NEGATIVE: 1
OCCASIONAL FEELINGS OF UNSTEADINESS: 0
DEPRESSION: 0
CARDIOVASCULAR NEGATIVE: 1
CONSTITUTIONAL NEGATIVE: 1
HEMATOLOGIC/LYMPHATIC NEGATIVE: 1
RESPIRATORY NEGATIVE: 1
EYES NEGATIVE: 1

## 2025-02-07 ASSESSMENT — PAIN SCALES - GENERAL: PAINLEVEL_OUTOF10: 0-NO PAIN

## 2025-02-07 NOTE — PATIENT INSTRUCTIONS
We had a chance to do your wellness visit today as well as to evaluate/manage her chronic medical problems.  Here are some of the very important assessments that we discussed:  1.  Advanced directives-I am glad that you have a power of  for healthcare.  As we had discussed given that we have a new medical record it might be good if you bring that back in and we will scanned it into therefore we will be available if you are ever 6/in the hospital  2.  Cancer screenings-as we discussed I ordered a mammogram for you  3.  Immunizations-the only vaccine you are due for right now is that RSV vaccine.  You on the good side already had your flu shot this season.  You had pneumonia vaccine in 2022 and shingles vaccines in 2019/20-you will need those in the future.  You had Tdap (tetanus/whooping cough) in 2016 and this will be due in 2026-I will help keep you reminded of that.  4.  Hypertension-your blood pressure looks good  5.  Prediabetes-your sugars look good  6.  Atrial fibrillation-looks like your heart is doing well right now  7.  History of hypothyroidism-as we discussed we need to reduce your levothyroxine back to the 100 mcg dosage because you are getting slightly too much right now.    Otherwise I will plan on seeing you back in 6 months unless you should need me sooner.

## 2025-03-04 ENCOUNTER — HOSPITAL ENCOUNTER (OUTPATIENT)
Dept: RADIOLOGY | Facility: CLINIC | Age: 80
Discharge: HOME | End: 2025-03-04
Payer: MEDICARE

## 2025-03-04 VITALS — HEIGHT: 67 IN | BODY MASS INDEX: 20.56 KG/M2 | WEIGHT: 131 LBS

## 2025-03-04 DIAGNOSIS — Z12.31 OTHER SCREENING MAMMOGRAM: ICD-10-CM

## 2025-03-04 DIAGNOSIS — M81.0 OSTEOPOROSIS: ICD-10-CM

## 2025-03-04 DIAGNOSIS — E55.9 VITAMIN D DEFICIENCY: ICD-10-CM

## 2025-03-04 PROCEDURE — 77067 SCR MAMMO BI INCL CAD: CPT | Performed by: RADIOLOGY

## 2025-03-04 PROCEDURE — 77067 SCR MAMMO BI INCL CAD: CPT

## 2025-03-04 PROCEDURE — 77063 BREAST TOMOSYNTHESIS BI: CPT | Performed by: RADIOLOGY

## 2025-03-04 PROCEDURE — 77080 DXA BONE DENSITY AXIAL: CPT

## 2025-04-21 ENCOUNTER — APPOINTMENT (OUTPATIENT)
Facility: CLINIC | Age: 80
End: 2025-04-21
Payer: MEDICARE

## 2025-04-21 ENCOUNTER — APPOINTMENT (OUTPATIENT)
Dept: CARDIOLOGY | Facility: CLINIC | Age: 80
End: 2025-04-21
Payer: MEDICARE

## 2025-04-22 ENCOUNTER — OFFICE VISIT (OUTPATIENT)
Dept: URGENT CARE | Age: 80
End: 2025-04-22
Payer: MEDICARE

## 2025-04-22 VITALS
RESPIRATION RATE: 18 BRPM | TEMPERATURE: 97.9 F | DIASTOLIC BLOOD PRESSURE: 79 MMHG | SYSTOLIC BLOOD PRESSURE: 121 MMHG | OXYGEN SATURATION: 97 % | WEIGHT: 132 LBS | HEART RATE: 70 BPM | HEIGHT: 64 IN | BODY MASS INDEX: 22.53 KG/M2

## 2025-04-22 DIAGNOSIS — N30.91 HEMORRHAGIC CYSTITIS: Primary | ICD-10-CM

## 2025-04-22 DIAGNOSIS — R10.9 FLANK PAIN: ICD-10-CM

## 2025-04-22 LAB
POC APPEARANCE, URINE: ABNORMAL
POC BILIRUBIN, URINE: NEGATIVE
POC BLOOD, URINE: ABNORMAL
POC COLOR, URINE: YELLOW
POC GLUCOSE, URINE: NEGATIVE MG/DL
POC KETONES, URINE: NEGATIVE MG/DL
POC LEUKOCYTES, URINE: ABNORMAL
POC NITRITE,URINE: NEGATIVE
POC PH, URINE: 6 PH
POC PROTEIN, URINE: ABNORMAL MG/DL
POC SPECIFIC GRAVITY, URINE: 1.01
POC UROBILINOGEN, URINE: 0.2 EU/DL

## 2025-04-22 PROCEDURE — 1160F RVW MEDS BY RX/DR IN RCRD: CPT | Performed by: EMERGENCY MEDICINE

## 2025-04-22 PROCEDURE — 1159F MED LIST DOCD IN RCRD: CPT | Performed by: EMERGENCY MEDICINE

## 2025-04-22 PROCEDURE — 81003 URINALYSIS AUTO W/O SCOPE: CPT | Performed by: EMERGENCY MEDICINE

## 2025-04-22 PROCEDURE — 99214 OFFICE O/P EST MOD 30 MIN: CPT | Performed by: EMERGENCY MEDICINE

## 2025-04-22 PROCEDURE — 3074F SYST BP LT 130 MM HG: CPT | Performed by: EMERGENCY MEDICINE

## 2025-04-22 PROCEDURE — 1036F TOBACCO NON-USER: CPT | Performed by: EMERGENCY MEDICINE

## 2025-04-22 PROCEDURE — 3078F DIAST BP <80 MM HG: CPT | Performed by: EMERGENCY MEDICINE

## 2025-04-22 RX ORDER — SULFAMETHOXAZOLE AND TRIMETHOPRIM 800; 160 MG/1; MG/1
1 TABLET ORAL 2 TIMES DAILY
Qty: 14 TABLET | Refills: 0 | Status: SHIPPED | OUTPATIENT
Start: 2025-04-22

## 2025-04-22 ASSESSMENT — ENCOUNTER SYMPTOMS: FLANK PAIN: 1

## 2025-04-22 NOTE — PROGRESS NOTES
"Subjective   Patient ID: Nicolette Hatch is a 80 y.o. female. They present today with a chief complaint of Urinary Problem (**HISTORY OF CHRONIC UTI'S**/Shakiness, left lower back pain x 1 week.).    History of Present Illness  HPI  This is an 80-year-old pleasant white female with history of recurrent UTI presents today complaining of intermittent chills for the past week.  Patient denies any hematuria pyuria or dysuria but admits to mild left flank pain.  She states that she suspect she might have a UTI.  Past Medical History  Allergies as of 04/22/2025 - Reviewed 04/22/2025   Allergen Reaction Noted    Codeine Other, GI Upset, Nausea/vomiting, and Drowsiness 09/06/2023    Indomethacin Swelling 09/06/2023    Nitrofurantoin Rash 01/22/2024    Nitrofurantoin monohyd/m-cryst Rash 09/06/2023       Prescriptions Prior to Admission[1]     Medical History[2]    Surgical History[3]     reports that she has never smoked. She has never been exposed to tobacco smoke. She has never used smokeless tobacco. She reports that she does not drink alcohol and does not use drugs.    Review of Systems  Review of Systems   Genitourinary:  Positive for flank pain.   All other systems reviewed and are negative.                                 Objective    Vitals:    04/22/25 1539   BP: 121/79   BP Location: Left arm   Patient Position: Sitting   BP Cuff Size: Small adult   Pulse: 70   Resp: 18   Temp: 36.6 °C (97.9 °F)   TempSrc: Oral   SpO2: 97%   Weight: 59.9 kg (132 lb)   Height: 1.626 m (5' 4\")     No LMP recorded. Patient is postmenopausal.    Physical Exam  Patient is awake alert oriented x 3 in no acute distress vital signs are stable.  She is afebrile.  Patient denies any abdominal pain.  There was questionable left-sided Jose sign.  No rigidity no rebound.  No guarding.  No suprapubic tenderness.  Procedures    Point of Care Test & Imaging Results from this visit  Results for orders placed or performed in visit on 04/22/25 "   POCT UA Automated manually resulted   Result Value Ref Range    POC Color, Urine Yellow Straw, Yellow, Light-Yellow    POC Appearance, Urine Hazy (A) Clear    POC Glucose, Urine NEGATIVE NEGATIVE mg/dl    POC Bilirubin, Urine NEGATIVE NEGATIVE    POC Ketones, Urine NEGATIVE NEGATIVE mg/dl    POC Specific Gravity, Urine 1.010 1.005 - 1.035    POC Blood, Urine MODERATE (2+) (A) NEGATIVE    POC PH, Urine 6.0 No Reference Range Established PH    POC Protein, Urine TRACE (A) NEGATIVE mg/dl    POC Urobilinogen, Urine 0.2 0.2, 1.0 EU/DL    Poc Nitrite, Urine NEGATIVE NEGATIVE    POC Leukocytes, Urine SMALL (1+) (A) NEGATIVE      Imaging  No results found.    Cardiology, Vascular, and Other Imaging  No other imaging results found for the past 2 days      Diagnostic study results (if any) were reviewed by Marvin Leo DO.    Assessment/Plan   Allergies, medications, history, and pertinent labs/EKGs/Imaging reviewed by Marvin Leo DO.     Medical Decision Making  The patient was informed of her urinalysis results she was reassured and discharged home in satisfactory condition after she was placed on Bactrim DS 1 p.o. twice daily for the next 7 days.    Orders and Diagnoses  Diagnoses and all orders for this visit:  Hemorrhagic cystitis  -     sulfamethoxazole-trimethoprim (Bactrim DS) 800-160 mg tablet; Take 1 tablet by mouth 2 times a day.  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    Allergic conjunctivitis 01/22/2024    Atherosclerosis of both carotid arteries 09/06/2023    Atrial fibrillation (Multi) 09/06/2023    Cellulitis 01/30/2023    Cervical spine arthritis 09/06/2023    Cervical spondylosis 09/06/2023    Chronic  reflux esophagitis 09/06/2023    Contact with and (suspected) exposure to covid-19 09/06/2022    Degenerative joint disease involving multiple joints 09/06/2023    Dizziness and giddiness 12/13/2022    Epiphora due to insufficient drainage of both sides 01/28/2016    Essential hypertension 09/06/2023    Female cystocele 09/06/2023    Flexor tenosynovitis of finger 09/06/2023    Frequency of urination 09/06/2022    Hyperlipidemia 09/06/2023    Hypothyroidism 09/06/2023    Intermittent chest pain 05/21/2024    Left bundle branch block 09/06/2023    Lower urinary tract infectious disease 01/22/2024    Macrocytosis without anemia 09/06/2023    Mitral valve prolapse 09/06/2023    Prediabetes 09/06/2023    Primary osteoarthritis of right hip 09/06/2023    Unspecified abdominal pain 09/06/2022   [3]   Past Surgical History:  Procedure Laterality Date    CATARACT EXTRACTION Bilateral 2015    COLONOSCOPY  2008    COLONOSCOPY  2013    COLONOSCOPY  03/17/2018    COLPORRHAPHY  2021    A+P    ESOPHAGOGASTRODUODENOSCOPY  2008    FOOT SURGERY  2007    FOOT SURGERY Left 2019    [facility]    HAND SURGERY Left 2005    carpometacarpo arthroplasty    LUMBAR SPINE SURGERY  1994    TOTAL ABDOMINAL HYSTERECTOMY W/ BILATERAL SALPINGOOPHORECTOMY  1980    TOTAL KNEE ARTHROPLASTY Left     August 9, 2023    TOTAL KNEE ARTHROPLASTY Right 06/05/2024    Dr. Brendan Mills    VARICOSE VEIN SURGERY  2016    Dr. Mohseni

## 2025-04-24 ENCOUNTER — TELEPHONE (OUTPATIENT)
Dept: URGENT CARE | Age: 80
End: 2025-04-24

## 2025-04-24 DIAGNOSIS — N30.01 ACUTE CYSTITIS WITH HEMATURIA: Primary | ICD-10-CM

## 2025-04-24 LAB — BACTERIA UR CULT: ABNORMAL

## 2025-04-24 RX ORDER — AMOXICILLIN AND CLAVULANATE POTASSIUM 875; 125 MG/1; MG/1
1 TABLET, FILM COATED ORAL 2 TIMES DAILY
Qty: 14 TABLET | Refills: 0 | Status: SHIPPED | OUTPATIENT
Start: 2025-04-24 | End: 2025-05-01

## 2025-04-24 NOTE — TELEPHONE ENCOUNTER
----- Message from Cori Vance sent at 4/24/2025  6:42 PM EDT -----  Was treated with Bactrim, however bacterium resistant. Will send in Macrobid to pharmacy  ----- Message -----  From: [facility]  Sent: 4/24/2025   4:11 AM EDT  To: [facility] Results

## 2025-04-24 NOTE — TELEPHONE ENCOUNTER
Result Communication    Resulted Orders   Urine Culture   Result Value Ref Range    CULTURE, URINE, ROUTINE SEE NOTE (A)       Comment:          CULTURE, URINE, ROUTINE         Micro Number:      [id number]    Test Status:       Final    Specimen Source:   Clean catch/voided    Specimen Quality:  Adequate    Result:            50,000-100,000 CFU/mL of Escherichia coli                              E.coli                            ----------------                            INT   BRIGHT     AMOX/CLAVULANATE       I     16     AMP/SULBACTAM          R     >=32     CEFAZOLIN              R     >=64     CEFEPIME               R     16     CEFTAZIDIME            R     16     CEFTRIAXONE            R     >=64     CIPROFLOXACIN          R     >=4     GENTAMICIN             S     <=1     IMIPENEM               S     <=0.25     LEVOFLOXACIN           R     >=8     MEROPENEM              S     <=0.25     NITROFURANTOIN         S     32     PIP/TAZOBACTAM         I     64     TRIMETHOPRIM/SULFA     R     >=320    S = Susceptible  I = Intermediate  R = Resistant  NS = Not susceptible  SDD = Susceptible Dose Dependent  * = Not Tested  NR = N ot Reported  **NN = See Therapy Comments           7:26 PM      Results were successfully communicated with the patient and they acknowledged their understanding.    Due to urine results provider changing antibiotic to Augmentin. Told patient the medication was called into pharmacy already and to stop taking the other one.

## 2025-04-24 NOTE — RESULT ENCOUNTER NOTE
Was treated with Bactrim, however bacterium resistant. Will send in Augmentin as she is allergic to Macrobid      04/24/25 at 6:44 PM - Cori Vance DO

## 2025-05-08 ENCOUNTER — NURSE TRIAGE (OUTPATIENT)
Dept: AUDIOLOGY | Facility: CLINIC | Age: 80
End: 2025-05-08
Payer: MEDICARE

## 2025-05-08 NOTE — TELEPHONE ENCOUNTER
Ms. Hatch is a patient of Dr. Sanchez. She owns aproximately 5 year old [employer] hearing aids purchased from another provider. She received recommendations from friends for [facility] and would like to transfer her hearing care.    Patient unable to complete

## 2025-05-08 NOTE — TELEPHONE ENCOUNTER
[facility] Call notes copied by Renetta Menendez on Thursday May 08, 2025  5:48 PM  ------  Documentation by Renetta Menendez. [[id number]] 5/7/2025  1:53 PM  Check hearing aids or new

## 2025-05-22 ENCOUNTER — TELEPHONE (OUTPATIENT)
Dept: PRIMARY CARE | Facility: CLINIC | Age: 80
End: 2025-05-22
Payer: MEDICARE

## 2025-05-22 NOTE — TELEPHONE ENCOUNTER
Patient c/o L eye blurriness x 1 day. Denies any other sx. Would like to know what you recommend. States that she has previously seen retinal specialist and ophthalmology for this issue a few years ago as it happens off and on over the years. Would like to know what you recommend? Please advise.

## 2025-05-28 NOTE — PROGRESS NOTES
[facility]: [facility]  PROGRESS NOTE      Nicolette Hatch is a 80 y.o. female that is presenting today for ER follow up.  The patient presented to [facility] on 5/23/2025 - bleeding from varicose vein. Patient reports that she has had a right calf varicose vein for quite a while. She is on Eliquis. Today she noted dry skin to her right calf and was picking at the dry skin when she developed pinhole area of bleeding.  States she did not have any work on the varicose veins.   Ha not had any further bleeding  Wound has clotted off and patient does not have active bleeding anymore. Wet-to-dry dressing was placed. We discussed wound care instructions.     Today she reports right shoulder pain.  Denies any injury,  no weakness, numbness, or decreased strength.  Reports right arm tingling.  Denies any neck pain.  Denies any right arm weakness      Assessment/Plan   Assessment & Plan  Follow-up exam after treatment  ER visit reviewed with the patient.       Varicose veins of lower extremity, unspecified laterality, unspecified whether complicated  Will refer to vein specialist for further evaluation and treatment of vein       Right shoulder pain, unspecified chronicity  Will notify patient of x ray result  If normal will refer to physical therapy  Tylenol as needed for any discomfort  Orders:    XR shoulder right 2+ views; Future    Paroxysmal A-fib (Multi)  No longer on amiodarone  Follow up with cardiology Dr. Boles in August as scheduled  If having bouts of a fib call and get a sooner appt.         Subjective   HPI  Review of Systems   Constitutional: Negative.    Respiratory: Negative.     Cardiovascular: Negative.    Gastrointestinal: Negative.    Musculoskeletal:         See hpi for details   Skin:  Positive for wound.        See hpi for details   Neurological: Negative.       Objective   There were no vitals filed for this visit.   There is no height or weight on file to calculate  BMI.  Physical Exam  Vitals and nursing note reviewed.   Constitutional:       Appearance: Normal appearance.   Cardiovascular:      Rate and Rhythm: Normal rate and regular rhythm.      Pulses: Normal pulses.      Heart sounds: Normal heart sounds.   Pulmonary:      Effort: Pulmonary effort is normal.      Breath sounds: Normal breath sounds.   Abdominal:      General: Bowel sounds are normal.      Palpations: Abdomen is soft.   Musculoskeletal:      Cervical back: Normal range of motion and neck supple.      Comments: Full rom to right should  No pain with palpation of right shoulder  Equal strong push pulls of arms       Skin:     Comments: Small scab right lower extremity medial aspect below knee  Area covered with a bandaide   Neurological:      General: No focal deficit present.      Mental Status: She is alert and oriented to person, place, and time.   Psychiatric:         Mood and Affect: Mood normal.         Behavior: Behavior normal.         Thought Content: Thought content normal.         Judgment: Judgment normal.       Diagnostic Results   Lab Results   Component Value Date    GLUCOSE 97 01/28/2025    CALCIUM 9.3 01/28/2025     01/28/2025    K 4.6 01/28/2025    CO2 25 01/28/2025     01/28/2025    BUN 19 01/28/2025    CREATININE 0.87 01/28/2025     Lab Results   Component Value Date    ALT 16 01/28/2025    AST 18 01/28/2025    GGT 22 09/27/2022    ALKPHOS 105 01/28/2025    BILITOT 0.5 01/28/2025     Lab Results   Component Value Date    WBC 6.3 01/28/2025    HGB 14.3 01/28/2025    HCT 44.5 01/28/2025    MCV 96.3 01/28/2025     01/28/2025     Lab Results   Component Value Date    CHOL 146 01/28/2025    CHOL 144 07/25/2024    CHOL 130 (L) 01/24/2024     Lab Results   Component Value Date    HDL 56 01/28/2025    HDL 52.0 07/25/2024    HDL 36.0 (L) 01/24/2024     Lab Results   Component Value Date    LDLCALC 72 01/28/2025    LDLCALC 71 07/25/2024    LDLCALC 69 01/24/2024     Lab Results  "  Component Value Date    TRIG 92 01/28/2025    TRIG 105 07/25/2024    TRIG 126 01/24/2024     No components found for: \"CHOLHDL\"  Lab Results   Component Value Date    HGBA1C 5.9 (H) 01/28/2025     Other labs not included in the list above were reviewed either before or during this encounter.    History    Medical History[1]  Surgical History[2]  Family History[3]  Social History     Socioeconomic History    Marital status:      Spouse name: Not on file    Number of children: Not on file    Years of education: Not on file    Highest education level: Not on file   Occupational History    Not on file   Tobacco Use    Smoking status: Never     Passive exposure: Never    Smokeless tobacco: Never   Vaping Use    Vaping status: Never Used   Substance and Sexual Activity    Alcohol use: Never    Drug use: Never    Sexual activity: Defer   Other Topics Concern    Not on file   Social History Narrative    Not on file     Social Drivers of Health     Financial Resource Strain: Not on file   Food Insecurity: Not on file   Transportation Needs: No Transportation Needs (6/20/2024)    OASIS : Transportation     Lack of Transportation (Medical): No     Lack of Transportation (Non-Medical): No     Patient Unable or Declines to Respond: No   Physical Activity: Not on file   Stress: Not on file   Social Connections: Feeling Socially Integrated (6/20/2024)    OASIS : Social Isolation     Frequency of experiencing loneliness or isolation: Never   Intimate Partner Violence: Not on file   Housing Stability: Not on file     Allergies[4]  Medications Ordered Prior to Encounter[5]  Immunization History   Administered Date(s) Administered    Flu vaccine, quadrivalent, high-dose, preservative free, age 65y+ (FLUZONE) 10/08/2020, 09/23/2021, 10/05/2022, 09/20/2023    Flu vaccine, trivalent, preservative free, HIGH-DOSE, age 65y+ (Fluzone) 11/03/2015, 10/17/2016, 09/27/2017, 10/22/2018, 10/07/2019, 10/09/2024    Influenza, " seasonal, injectable 10/18/2010, 10/10/2011, 10/29/2012, 09/24/2013, 10/07/2014    Moderna COVID-19 vaccine, 12 years and older (50mcg/0.5mL)(Spikevax) 12/05/2023, 10/15/2024    Moderna COVID-19 vaccine, bivalent, blue cap/gray label *Check age/dose* 12/09/2022, 05/07/2023    Moderna SARS-CoV-2 Vaccination 03/01/2021, 03/29/2021, 11/11/2021    Pneumococcal conjugate vaccine, 13-valent (PREVNAR 13) 07/13/2015    Pneumococcal conjugate vaccine, 20-valent (PREVNAR 20) 10/05/2022    Pneumococcal polysaccharide vaccine, 23-valent, age 2 years and older (PNEUMOVAX 23) 07/29/2008, 09/24/2013    Td vaccine, age 7 years and older (TDVAX) 07/29/2005    Tdap vaccine, age 7 year and older (BOOSTRIX, ADACEL) 01/18/2016    Zoster vaccine, recombinant, adult (SHINGRIX) 02/07/2019, 08/28/2020    Zoster, live 02/29/2008     Patient's medical history was reviewed and updated either before or during this encounter.       Elvie Fink, APRN-CNP         [1]   Past Medical History:  Diagnosis Date    Abnormality of pancreatic duct 09/06/2023    Allergic conjunctivitis 01/22/2024    Atherosclerosis of both carotid arteries 09/06/2023    Atrial fibrillation (Multi) 09/06/2023    Cellulitis 01/30/2023    Cervical spine arthritis 09/06/2023    Cervical spondylosis 09/06/2023    Chronic reflux esophagitis 09/06/2023    Contact with and (suspected) exposure to covid-19 09/06/2022    Degenerative joint disease involving multiple joints 09/06/2023    Dizziness and giddiness 12/13/2022    Epiphora due to insufficient drainage of both sides 01/28/2016    Essential hypertension 09/06/2023    Female cystocele 09/06/2023    Flexor tenosynovitis of finger 09/06/2023    Frequency of urination 09/06/2022    Hyperlipidemia 09/06/2023    Hypothyroidism 09/06/2023    Intermittent chest pain 05/21/2024    Left bundle branch block 09/06/2023    Lower urinary tract infectious disease 01/22/2024    Macrocytosis without anemia 09/06/2023    Mitral valve  prolapse 09/06/2023    Prediabetes 09/06/2023    Primary osteoarthritis of right hip 09/06/2023    Unspecified abdominal pain 09/06/2022   [2]   Past Surgical History:  Procedure Laterality Date    CATARACT EXTRACTION Bilateral 2015    COLONOSCOPY  2008    COLONOSCOPY  2013    COLONOSCOPY  03/17/2018    COLPORRHAPHY  2021    A+P    ESOPHAGOGASTRODUODENOSCOPY  2008    FOOT SURGERY  2007    FOOT SURGERY Left 2019    [facility]    HAND SURGERY Left 2005    carpometacarpo arthroplasty    LUMBAR SPINE SURGERY  1994    TOTAL ABDOMINAL HYSTERECTOMY W/ BILATERAL SALPINGOOPHORECTOMY  1980    TOTAL KNEE ARTHROPLASTY Left     August 9, 2023    TOTAL KNEE ARTHROPLASTY Right 06/05/2024    Dr. Brendan Mills    VARICOSE VEIN SURGERY  2016    Dr. Mohseni   [3]   Family History  Problem Relation Name Age of Onset    Colon cancer Mother      COPD Father Champ     Lung disease Father Champ     Other (Calcified heart valves) Sister      Colon cancer Brother     [4]   Allergies  Allergen Reactions    Codeine Other, GI Upset, Nausea/vomiting and Drowsiness     Nausea    Flu-like sx    oversedation    Indomethacin Swelling    Nitrofurantoin Rash    Nitrofurantoin Monohyd/M-Cryst Rash   [5]   Current Outpatient Medications on File Prior to Visit   Medication Sig Dispense Refill    acetaminophen (Tylenol) 325 mg tablet Take 1 tablet (325 mg) by mouth if needed for mild pain (1 - 3).      apixaban (Eliquis) 5 mg tablet Take 1 tablet (5 mg) by mouth 2 times a day. AS DIRECTED 60 tablet 6    atorvastatin (Lipitor) 40 mg tablet Take 1 tablet (40 mg) by mouth once daily. 90 tablet 3    cholecalciferol, vitamin D3, (VITAMIN D3 ORAL) Take by mouth once daily.      cyanocobalamin, vitamin B-12, (VITAMIN B-12 ORAL) Take by mouth once daily.      fexofenadine (Allegra) 180 mg tablet Take 1 tablet (180 mg) by mouth once daily as needed (allergic rhinitis). 30 tablet 11    ipratropium (Atrovent) 42 mcg (0.06 %) nasal spray Administer 2 sprays into each  nostril 3 times a day. (Patient taking differently: Administer 2 sprays into each nostril once daily.) 15 mL 11    Lactobacillus acidophilus (ACIDOPHILUS ORAL) Take by mouth once daily. PROBIOTIC      levothyroxine (Synthroid, Levoxyl) 100 mcg tablet Take 1 tablet (100 mcg) by mouth early in the morning.. Take on an empty stomach at the same time each day, either 30 to 60 minutes prior to breakfast 90 tablet 3    losartan (Cozaar) 25 mg tablet Take 1 tablet (25 mg) by mouth once daily. 90 tablet 3    MAGNESIUM ORAL Magnesium 500 MG  1 tablet with a meal Orally Once a day      sulfamethoxazole-trimethoprim (Bactrim DS) 800-160 mg tablet Take 1 tablet by mouth 2 times a day. 14 tablet 0     No current facility-administered medications on file prior to visit.

## 2025-05-29 ENCOUNTER — OFFICE VISIT (OUTPATIENT)
Dept: PRIMARY CARE | Facility: CLINIC | Age: 80
End: 2025-05-29
Payer: MEDICARE

## 2025-05-29 ENCOUNTER — HOSPITAL ENCOUNTER (OUTPATIENT)
Dept: RADIOLOGY | Facility: CLINIC | Age: 80
Discharge: HOME | End: 2025-05-29
Payer: MEDICARE

## 2025-05-29 VITALS
DIASTOLIC BLOOD PRESSURE: 80 MMHG | TEMPERATURE: 97.6 F | HEIGHT: 64 IN | HEART RATE: 68 BPM | WEIGHT: 136 LBS | BODY MASS INDEX: 23.22 KG/M2 | SYSTOLIC BLOOD PRESSURE: 120 MMHG

## 2025-05-29 DIAGNOSIS — M25.511 RIGHT SHOULDER PAIN, UNSPECIFIED CHRONICITY: ICD-10-CM

## 2025-05-29 DIAGNOSIS — I48.0 PAROXYSMAL A-FIB (MULTI): ICD-10-CM

## 2025-05-29 DIAGNOSIS — Z09 FOLLOW-UP EXAM AFTER TREATMENT: Primary | ICD-10-CM

## 2025-05-29 DIAGNOSIS — I83.90 VARICOSE VEINS OF LOWER EXTREMITY, UNSPECIFIED LATERALITY, UNSPECIFIED WHETHER COMPLICATED: ICD-10-CM

## 2025-05-29 PROCEDURE — 99214 OFFICE O/P EST MOD 30 MIN: CPT | Performed by: NURSE PRACTITIONER

## 2025-05-29 PROCEDURE — 73030 X-RAY EXAM OF SHOULDER: CPT | Mod: RIGHT SIDE | Performed by: RADIOLOGY

## 2025-05-29 PROCEDURE — 1159F MED LIST DOCD IN RCRD: CPT | Performed by: NURSE PRACTITIONER

## 2025-05-29 PROCEDURE — 3079F DIAST BP 80-89 MM HG: CPT | Performed by: NURSE PRACTITIONER

## 2025-05-29 PROCEDURE — 73030 X-RAY EXAM OF SHOULDER: CPT | Mod: RT

## 2025-05-29 PROCEDURE — 1126F AMNT PAIN NOTED NONE PRSNT: CPT | Performed by: NURSE PRACTITIONER

## 2025-05-29 PROCEDURE — 1036F TOBACCO NON-USER: CPT | Performed by: NURSE PRACTITIONER

## 2025-05-29 PROCEDURE — 3074F SYST BP LT 130 MM HG: CPT | Performed by: NURSE PRACTITIONER

## 2025-05-29 ASSESSMENT — ENCOUNTER SYMPTOMS
NEUROLOGICAL NEGATIVE: 1
RESPIRATORY NEGATIVE: 1
ROS SKIN COMMENTS: SEE HPI FOR DETAILS
WOUND: 1
CONSTITUTIONAL NEGATIVE: 1
CARDIOVASCULAR NEGATIVE: 1
GASTROINTESTINAL NEGATIVE: 1

## 2025-05-29 ASSESSMENT — PAIN SCALES - GENERAL: PAINLEVEL_OUTOF10: 0-NO PAIN

## 2025-05-29 NOTE — ASSESSMENT & PLAN NOTE
Will notify patient of x ray result  If normal will refer to physical therapy  Tylenol as needed for any discomfort  Orders:    XR shoulder right 2+ views; Future

## 2025-05-29 NOTE — ASSESSMENT & PLAN NOTE
No longer on amiodarone  Follow up with cardiology Dr. Boles in August as scheduled  If having bouts of a fib call and get a sooner appt.

## 2025-06-11 ENCOUNTER — APPOINTMENT (OUTPATIENT)
Age: 80
End: 2025-06-11
Payer: MEDICARE

## 2025-06-11 VITALS
SYSTOLIC BLOOD PRESSURE: 144 MMHG | DIASTOLIC BLOOD PRESSURE: 82 MMHG | OXYGEN SATURATION: 100 % | HEART RATE: 63 BPM | RESPIRATION RATE: 14 BRPM | BODY MASS INDEX: 21.5 KG/M2 | HEIGHT: 67 IN | WEIGHT: 137 LBS

## 2025-06-11 DIAGNOSIS — I10 ESSENTIAL HYPERTENSION: ICD-10-CM

## 2025-06-11 DIAGNOSIS — E78.2 MIXED HYPERLIPIDEMIA: ICD-10-CM

## 2025-06-11 DIAGNOSIS — I48.0 PAROXYSMAL ATRIAL FIBRILLATION (MULTI): Primary | ICD-10-CM

## 2025-06-11 PROCEDURE — 1036F TOBACCO NON-USER: CPT | Performed by: INTERNAL MEDICINE

## 2025-06-11 PROCEDURE — 93000 ELECTROCARDIOGRAM COMPLETE: CPT | Performed by: INTERNAL MEDICINE

## 2025-06-11 PROCEDURE — 1126F AMNT PAIN NOTED NONE PRSNT: CPT | Performed by: INTERNAL MEDICINE

## 2025-06-11 PROCEDURE — 1159F MED LIST DOCD IN RCRD: CPT | Performed by: INTERNAL MEDICINE

## 2025-06-11 PROCEDURE — 99213 OFFICE O/P EST LOW 20 MIN: CPT | Performed by: INTERNAL MEDICINE

## 2025-06-11 PROCEDURE — 3077F SYST BP >= 140 MM HG: CPT | Performed by: INTERNAL MEDICINE

## 2025-06-11 PROCEDURE — 3079F DIAST BP 80-89 MM HG: CPT | Performed by: INTERNAL MEDICINE

## 2025-06-11 ASSESSMENT — ENCOUNTER SYMPTOMS
ORTHOPNEA: 0
MYALGIAS: 0
SYNCOPE: 0
IRREGULAR HEARTBEAT: 0
PND: 0
WEIGHT LOSS: 0
NEAR-SYNCOPE: 0
DEPRESSION: 0
LOSS OF SENSATION IN FEET: 0
WEIGHT GAIN: 0
FEVER: 0
COUGH: 0
SHORTNESS OF BREATH: 0
DIZZINESS: 0
DYSPNEA ON EXERTION: 0
PALPITATIONS: 0
CLAUDICATION: 0
OCCASIONAL FEELINGS OF UNSTEADINESS: 0
WHEEZING: 0
WEAKNESS: 0
DIAPHORESIS: 0

## 2025-06-11 ASSESSMENT — LIFESTYLE VARIABLES
AUDIT-C TOTAL SCORE: 0
SKIP TO QUESTIONS 9-10: 1
HOW OFTEN DO YOU HAVE SIX OR MORE DRINKS ON ONE OCCASION: NEVER
HAVE YOU OR SOMEONE ELSE BEEN INJURED AS A RESULT OF YOUR DRINKING: NO
HAS A RELATIVE, FRIEND, DOCTOR, OR ANOTHER HEALTH PROFESSIONAL EXPRESSED CONCERN ABOUT YOUR DRINKING OR SUGGESTED YOU CUT DOWN: NO
HOW OFTEN DO YOU HAVE A DRINK CONTAINING ALCOHOL: NEVER
AUDIT TOTAL SCORE: 0
HOW MANY STANDARD DRINKS CONTAINING ALCOHOL DO YOU HAVE ON A TYPICAL DAY: PATIENT DOES NOT DRINK

## 2025-06-11 ASSESSMENT — PAIN SCALES - GENERAL: PAINLEVEL_OUTOF10: 0-NO PAIN

## 2025-06-11 NOTE — PROGRESS NOTES
Subjective      Chief Complaint   Patient presents with    Follow-up        79-year-old female with history of atrial fibrillation on oral anticoagulation in the form of Eliquis.  She also has hypertension and hyperlipidemia.  She used to follow with Dr. Golden.  She has been on amiodarone in the past, this was stopped secondary to new exercise intolerance and some interstitial changes seen on chest CT as well as an impaired DLCO on PFTs.  She has seen pulmonary for this.  She also has a history of mitral valve prolapse with mild mitral regurgitation. She had 2 episodes of atrial fibrillation since she was seen last, longest was 3 hrs. She has no chest pain or dyspnea.          Review of Systems   Constitutional: Negative for diaphoresis, fever, weight gain and weight loss.   Eyes:  Negative for visual disturbance.   Cardiovascular:  Negative for chest pain, claudication, dyspnea on exertion, irregular heartbeat, leg swelling, near-syncope, orthopnea, palpitations, paroxysmal nocturnal dyspnea and syncope.   Respiratory:  Negative for cough, shortness of breath and wheezing.    Musculoskeletal:  Negative for muscle weakness and myalgias.   Neurological:  Negative for dizziness and weakness.   All other systems reviewed and are negative.       Medical History[1]     Surgical History[2]     Social History     Socioeconomic History    Marital status:      Spouse name: Not on file    Number of children: Not on file    Years of education: Not on file    Highest education level: Not on file   Occupational History    Not on file   Tobacco Use    Smoking status: Never     Passive exposure: Never    Smokeless tobacco: Never   Vaping Use    Vaping status: Never Used   Substance and Sexual Activity    Alcohol use: Never    Drug use: Never    Sexual activity: Defer   Other Topics Concern    Not on file   Social History Narrative    Not on file     Social Drivers of Health     Financial Resource Strain: Not on file   Food  Insecurity: Not on file   Transportation Needs: No Transportation Needs (6/20/2024)    OASIS : Transportation     Lack of Transportation (Medical): No     Lack of Transportation (Non-Medical): No     Patient Unable or Declines to Respond: No   Physical Activity: Not on file   Stress: Not on file   Social Connections: Feeling Socially Integrated (6/20/2024)    OASIS : Social Isolation     Frequency of experiencing loneliness or isolation: Never   Intimate Partner Violence: Not on file   Housing Stability: Not on file        Family History[3]     OBJECTIVE:    Vitals:    06/11/25 1547   BP: 144/82   Pulse: 63   Resp: 14   SpO2: 100%        Vitals reviewed.   Constitutional:       Appearance: Normal and healthy appearance. Not in distress.   Pulmonary:      Effort: Pulmonary effort is normal.      Breath sounds: Normal breath sounds.   Cardiovascular:      Normal rate. Regular rhythm. Normal S1. Normal S2.       Murmurs: There is no murmur.      No gallop.  No click.   Pulses:     Intact distal pulses.   Edema:     Peripheral edema absent.   Skin:     General: Skin is warm and dry.   Neurological:      General: No focal deficit present.          Lab Review:   Lab Results   Component Value Date     01/28/2025    K 4.6 01/28/2025     01/28/2025    CO2 25 01/28/2025    BUN 19 01/28/2025    CREATININE 0.87 01/28/2025    GLUCOSE 97 01/28/2025    CALCIUM 9.3 01/28/2025     Lab Results   Component Value Date    CHOL 146 01/28/2025    TRIG 92 01/28/2025    HDL 56 01/28/2025       Lab Results   Component Value Date    LDLCALC 72 01/28/2025        Essential hypertension  Elevated today. She does not check at home. Encouraged to do so. Will keep losartan at 25mg QD    Paroxysmal atrial fibrillation (Multi)  2 brief paroxysms since seen last. Well controlled off amio for 1 yr. Continue apixaban for stroke risk reduction            [1]   Past Medical History:  Diagnosis Date    Abnormality of pancreatic duct  09/06/2023    Allergic conjunctivitis 01/22/2024    Atherosclerosis of both carotid arteries 09/06/2023    Atrial fibrillation (Multi) 09/06/2023    Cellulitis 01/30/2023    Cervical spine arthritis 09/06/2023    Cervical spondylosis 09/06/2023    Chronic reflux esophagitis 09/06/2023    Contact with and (suspected) exposure to covid-19 09/06/2022    Degenerative joint disease involving multiple joints 09/06/2023    Dizziness and giddiness 12/13/2022    Epiphora due to insufficient drainage of both sides 01/28/2016    Essential hypertension 09/06/2023    Female cystocele 09/06/2023    Flexor tenosynovitis of finger 09/06/2023    Frequency of urination 09/06/2022    Hyperlipidemia 09/06/2023    Hypothyroidism 09/06/2023    Intermittent chest pain 05/21/2024    Left bundle branch block 09/06/2023    Lower urinary tract infectious disease 01/22/2024    Macrocytosis without anemia 09/06/2023    Mitral valve prolapse 09/06/2023    Prediabetes 09/06/2023    Primary osteoarthritis of right hip 09/06/2023    Unspecified abdominal pain 09/06/2022   [2]   Past Surgical History:  Procedure Laterality Date    CATARACT EXTRACTION Bilateral 2015    COLONOSCOPY  2008    COLONOSCOPY  2013    COLONOSCOPY  03/17/2018    COLPORRHAPHY  2021    A+P    ESOPHAGOGASTRODUODENOSCOPY  2008    FOOT SURGERY  2007    FOOT SURGERY Left 2019    [facility]    HAND SURGERY Left 2005    carpometacarpo arthroplasty    LUMBAR SPINE SURGERY  1994    TOTAL ABDOMINAL HYSTERECTOMY W/ BILATERAL SALPINGOOPHORECTOMY  1980    TOTAL KNEE ARTHROPLASTY Left     August 9, 2023    TOTAL KNEE ARTHROPLASTY Right 06/05/2024    Dr. Brendan Mills    VARICOSE VEIN SURGERY  2016    Dr. Mohseni   [3]   Family History  Problem Relation Name Age of Onset    Colon cancer Mother      COPD Father Champ     Lung disease Father Champ     Other (Calcified heart valves) Sister      Colon cancer Brother

## 2025-06-11 NOTE — ASSESSMENT & PLAN NOTE
2 brief paroxysms since seen last. Well controlled off amio for 1 yr. Continue apixaban for stroke risk reduction

## 2025-06-12 ENCOUNTER — TELEPHONE (OUTPATIENT)
Dept: PRIMARY CARE | Facility: CLINIC | Age: 80
End: 2025-06-12
Payer: MEDICARE

## 2025-06-12 DIAGNOSIS — M25.511 RIGHT SHOULDER PAIN, UNSPECIFIED CHRONICITY: Primary | ICD-10-CM

## 2025-06-12 NOTE — TELEPHONE ENCOUNTER
Patient requesting referral for pt for R shoulder pain. Requesting call back once referral is placed.

## 2025-06-16 ENCOUNTER — TELEPHONE (OUTPATIENT)
Dept: PRIMARY CARE | Facility: CLINIC | Age: 80
End: 2025-06-16
Payer: MEDICARE

## 2025-06-16 DIAGNOSIS — H53.2 DIPLOPIA: ICD-10-CM

## 2025-06-16 DIAGNOSIS — G45.9 TIA (TRANSIENT ISCHEMIC ATTACK): ICD-10-CM

## 2025-06-16 DIAGNOSIS — H53.9 VISUAL DISTURBANCES: ICD-10-CM

## 2025-06-16 NOTE — TELEPHONE ENCOUNTER
it must be dr meyer, not dr mohseni - dr delgado just does varicose veins not vascular issues related to stroke etc

## 2025-06-16 NOTE — TELEPHONE ENCOUNTER
Reviewed consult from dr gustavo bravo visual disturbance and need for neuro and vascular evaluation - she will need some testing and consults as follows:  See her neurologist - if she doesn't have one, place internal referral  Return to dr. Aura lennon rvascular evaluation.  MR Brain without contrast  Ultrasound carotids  You can use diagnosis of TIA

## 2025-06-17 NOTE — TELEPHONE ENCOUNTER
Pt states that she was referred to Dr. Mccormick, but the first available isn't until November. Please advise.

## 2025-06-25 ENCOUNTER — HOSPITAL ENCOUNTER (OUTPATIENT)
Dept: RADIOLOGY | Facility: CLINIC | Age: 80
Discharge: HOME | End: 2025-06-25
Payer: MEDICARE

## 2025-06-25 DIAGNOSIS — H53.9 VISUAL DISTURBANCES: ICD-10-CM

## 2025-06-25 DIAGNOSIS — H53.2 DIPLOPIA: ICD-10-CM

## 2025-06-25 DIAGNOSIS — G45.9 TIA (TRANSIENT ISCHEMIC ATTACK): ICD-10-CM

## 2025-06-25 PROCEDURE — 93880 EXTRACRANIAL BILAT STUDY: CPT

## 2025-06-25 PROCEDURE — 70551 MRI BRAIN STEM W/O DYE: CPT

## 2025-07-03 ENCOUNTER — OFFICE VISIT (OUTPATIENT)
Dept: VASCULAR SURGERY | Facility: CLINIC | Age: 80
End: 2025-07-03
Payer: MEDICARE

## 2025-07-03 VITALS
HEIGHT: 67 IN | WEIGHT: 137.2 LBS | BODY MASS INDEX: 21.53 KG/M2 | DIASTOLIC BLOOD PRESSURE: 80 MMHG | HEART RATE: 79 BPM | OXYGEN SATURATION: 97 % | SYSTOLIC BLOOD PRESSURE: 125 MMHG

## 2025-07-03 DIAGNOSIS — I48.0 PAROXYSMAL ATRIAL FIBRILLATION (MULTI): ICD-10-CM

## 2025-07-03 DIAGNOSIS — I65.23 ASYMPTOMATIC BILATERAL CAROTID ARTERY STENOSIS: Primary | ICD-10-CM

## 2025-07-03 PROCEDURE — 1036F TOBACCO NON-USER: CPT | Performed by: NURSE PRACTITIONER

## 2025-07-03 PROCEDURE — 3074F SYST BP LT 130 MM HG: CPT | Performed by: NURSE PRACTITIONER

## 2025-07-03 PROCEDURE — 1159F MED LIST DOCD IN RCRD: CPT | Performed by: NURSE PRACTITIONER

## 2025-07-03 PROCEDURE — 1160F RVW MEDS BY RX/DR IN RCRD: CPT | Performed by: NURSE PRACTITIONER

## 2025-07-03 PROCEDURE — 99213 OFFICE O/P EST LOW 20 MIN: CPT | Performed by: NURSE PRACTITIONER

## 2025-07-03 PROCEDURE — 3079F DIAST BP 80-89 MM HG: CPT | Performed by: NURSE PRACTITIONER

## 2025-07-03 PROCEDURE — 1126F AMNT PAIN NOTED NONE PRSNT: CPT | Performed by: NURSE PRACTITIONER

## 2025-07-03 PROCEDURE — 1157F ADVNC CARE PLAN IN RCRD: CPT | Performed by: NURSE PRACTITIONER

## 2025-07-03 PROCEDURE — 99203 OFFICE O/P NEW LOW 30 MIN: CPT | Performed by: NURSE PRACTITIONER

## 2025-07-03 ASSESSMENT — PAIN SCALES - GENERAL: PAINLEVEL_OUTOF10: 0-NO PAIN

## 2025-07-03 NOTE — PROGRESS NOTES
Subjective   Patient ID: Nicolette Hatch is a 80 y.o. female who presents for carotid stenosis    HPI    Nicolette Hatch [birth year]  NPV carotid stenosis    This is an 80 year old female with PMH of a-fib on Eliquis, htn and HLD who presents to the office for further evaluation of visual disturbance.  She is a patient of Dr. Mcgraw who referred her to neurology and vascular surgery.  Patient tells me she has had issues with her left eye for several years however, episodes have become more frequent which cause concern, on exam she tells me she has not had any further episodes for the last 2 months.  She states her left eye becomes blurry if she looks up it resolves more quickly.  She denies other visual changes.  She denies unilateral weakness or difficulty with speech.  Denies headache associated with episodes.  No dizziness.  She is right-handed.  Tells me she has seen the ophthalmologist twice who stated that her eyes are healthy.  She ambulates freely unassisted.    Carotid duplex 6/25/2025 with less than 50% stenosis bilaterally.  ICA 47, left ICA 67.  Right vertebral artery was not visualized, left vertebral artery demonstrates proximal antegrade flow.  Carotid duplex from 2022 with less than 50% stenosis b/l .      Review of Systems  As noted in HPI    Objective   Physical Exam  Constitutional:  Alert and oriented to person, place, date/time in no acute distress.  HEENT:  Atraumatic, normocephalic. PERRL. EOMI.  Nares patent.  Mucous membranes moist.  Smile symmetric.   Neck:  Trachea midline.  Respiratory:  Clear to auscultation.  Cardiac:  Regular rate and rhythm.    Cardiovascular:  No edema of the extremities.  Pulse exam: Radial and femoral pulses palpable bilateral. Pedal pulses:  Abdominal:  Soft, nontender, nondistended, bowel sounds present.  Musculoskeletal:  Moves extremities freely.  Unilateral weakness.  Normal gait.  Dermatological: Clean and dry   Neurological: Alert and oriented to person, place,  date/time  Psych:  Calm, cooperative      Assessment/Plan     Symptomatic carotid stenosis  Atrial fibrillation    Carotid ultrasound and MRI discussed with Dr. Perez who is the fellow with Dr. Chavarria.  The patient with left eye visual changes which were more frequently however, no episodes in the last 2 months.  Seen by retinal specialist twice.  MRI of the brain was negative for infarct.  The carotid duplex was essentially normal however, right vertebral artery was not visualized.  No further imaging warranted from vascular standpoint.  Will discuss again with Dr. Chavarria next week, I explained to the patient if he has other recommendations I will call her.    Patient on Eliquis for A-fib  Blood pressure control  Patient to follow-up with neurology  On statin  Follow-up as needed       TREMAINE Sofia-CNP 07/03/25 11:04 AM

## 2025-07-10 ENCOUNTER — APPOINTMENT (OUTPATIENT)
Dept: AUDIOLOGY | Facility: CLINIC | Age: 80
End: 2025-07-10

## 2025-07-10 ENCOUNTER — APPOINTMENT (OUTPATIENT)
Dept: AUDIOLOGY | Facility: CLINIC | Age: 80
End: 2025-07-10
Payer: MEDICARE

## 2025-07-10 DIAGNOSIS — H90.3 SENSORINEURAL HEARING LOSS (SNHL) OF BOTH EARS: Primary | ICD-10-CM

## 2025-07-10 DIAGNOSIS — H93.13 TINNITUS OF BOTH EARS: Primary | ICD-10-CM

## 2025-07-10 DIAGNOSIS — H90.3 SENSORINEURAL HEARING LOSS (SNHL) OF BOTH EARS: ICD-10-CM

## 2025-07-10 PROCEDURE — 92557 COMPREHENSIVE HEARING TEST: CPT | Performed by: AUDIOLOGIST

## 2025-07-10 PROCEDURE — V5014 HEARING AID REPAIR/MODIFYING: HCPCS | Performed by: AUDIOLOGIST

## 2025-07-10 PROCEDURE — 92567 TYMPANOMETRY: CPT | Performed by: AUDIOLOGIST

## 2025-07-10 NOTE — PROGRESS NOTES
HEARING AID CHECK- OBTAINED HEARING AIDS AT [facility]    RIGHT: PHONAK AUDEO M90 R RECHARGEABLE PAYTON WITH A #2 M (?)  AND CAP DOME  S.N.: [id number]  LEFT: PHONAK AUDEO M90 R RECHARGEABLE PAYTON WITH A #2 M (?)  AND CAP DOME  S.N.: [id number]  WARRANTY EXPIRES:     The patient obtained hearing aids at an outside facility five years ago.  She would like to establish hearing aid care at this facility.  Connected her hearing aids to the hearing aid software and saved her current settings.  Otoscopy revealed clear ear canals bilaterally.  Tested her hearing and put the new hearing thresholds in the hearing aid software.  Cleaned and dehumidified both hearing aids and vacuumed the microphones.  Replaced the wax guards, retention tails, and domes.  The post cleaning listening check revealed that both hearing aids were functioning. Replaced the medium open domes with cap domes for a better fit.  The patient has narrow ear canals and had a lot of difficulty inserting the hearing aids with the medium domes.  Connected the hearing aids to the software and recalculated the fitting to accommodate any change in hearing and the change in acoustic parameters.  Perfomed speechmapping and adjusted the hearing aids to approximate her targets for soft, moderate and loud speech inputs.  The patient reported that she could hear well after today's cleaning and adjustments.  Gave her replacement domes.  Discussed new hearing aid technology.  Recall in one year or sooner if needed.  $65.00.    Appointment time: 4:00-4:45

## 2025-07-11 NOTE — PROGRESS NOTES
AUDIOLOGY ADULT AUDIOMETRIC EVALUATION    Name:  Nicolette Hatch  :  1945 Age:  80 y.o.  Date of Evaluation:  July 10, 2025    Reason for visit: Ms. Hatch is seen in the clinic today for an audiologic evaluation.     HISTORY  The patient has a history of bilateral hearing loss and wears binaural hearing aids that she obtained at an outside facility.  Constant bilateral tinnitus was reported.    EVALUATION  See scanned audiogram: “Media” > “Audiology Report”.      RESULTS  Otoscopic Evaluation:  Right Ear: clear ear canal  Left Ear: clear ear canal    Immittance Measures:  Tympanometry:  Right Ear: Type A, normal tympanic membrane mobility with normal middle ear pressure   Left Ear: Type A, normal tympanic membrane mobility with normal middle ear pressure     Acoustic Reflexes:  Ipsilateral Right Ear: did not evaluate   Ipsilateral Left Ear: did not evaluate   Contralateral Right Ear: did not evaluate  Contralateral Left Ear: did not evaluate    Distortion Product Otoacoustic Emissions (DPOAEs):  Right Ear: did not evaluate   Left Ear: did not evaluate     Audiometry:  Test Technique and Reliability:   Standard audiometry via insert earphones/supra-aural headphones. Reliability is good.    Pure tone air and bone conduction audiometry:  Right Ear: mild sloping to severe sensorineural hearing loss   Left Ear: mild sloping to profound sensorineural hearing loss     Speech Audiometry (Word Recognition Scores):   Right Ear: Excellent, 96% in quiet at an elevated presentation level   Left Ear: Excellent, 92% in quiet at an elevated presentation level     IMPRESSIONS  Results of today's audiometric evaluation revealed a bilateral sensorineural hearing loss.  Results of tympanometry testing indicated normal middle ear function in both ears.     RECOMMENDATIONS  - Annual audiologic evaluation, sooner if an acute change is noted.  - Continued hearing aid use.  - Follow-up with audiology annually for routine hearing  aid maintenance, sooner if questions/problems arise.    PATIENT EDUCATION  Discussed results, impressions and recommendations with the patient. Questions were addressed and the patient was encouraged to contact our office should concerns arise.    Time for this encounter: 3:30-4:00    Johanny Good M.A., CCC-A   Licensed Audiologist

## 2025-07-23 DIAGNOSIS — D49.0 IPMN (INTRADUCTAL PAPILLARY MUCINOUS NEOPLASM): Primary | ICD-10-CM

## 2025-07-23 DIAGNOSIS — Q45.3 ABNORMALITY OF PANCREATIC DUCT: ICD-10-CM

## 2025-07-23 DIAGNOSIS — D37.8 NEOPLASM OF UNCERTAIN BEHAVIOR OF TAIL OF PANCREAS: ICD-10-CM

## 2025-07-24 ENCOUNTER — TELEPHONE (OUTPATIENT)
Dept: SURGICAL ONCOLOGY | Facility: HOSPITAL | Age: 80
End: 2025-07-24
Payer: MEDICARE

## 2025-07-28 ENCOUNTER — OFFICE VISIT (OUTPATIENT)
Dept: NEUROLOGY | Facility: CLINIC | Age: 80
End: 2025-07-28
Payer: MEDICARE

## 2025-07-28 VITALS
DIASTOLIC BLOOD PRESSURE: 74 MMHG | BODY MASS INDEX: 20.88 KG/M2 | RESPIRATION RATE: 18 BRPM | HEART RATE: 78 BPM | HEIGHT: 67 IN | TEMPERATURE: 97 F | SYSTOLIC BLOOD PRESSURE: 118 MMHG | WEIGHT: 133 LBS

## 2025-07-28 DIAGNOSIS — E78.2 MIXED HYPERLIPIDEMIA: ICD-10-CM

## 2025-07-28 DIAGNOSIS — E55.9 VITAMIN D DEFICIENCY: ICD-10-CM

## 2025-07-28 DIAGNOSIS — E03.9 ACQUIRED HYPOTHYROIDISM: ICD-10-CM

## 2025-07-28 DIAGNOSIS — R73.9 HYPERGLYCEMIA: ICD-10-CM

## 2025-07-28 DIAGNOSIS — H53.8 BLURRY VISION: Primary | ICD-10-CM

## 2025-07-28 DIAGNOSIS — I10 ESSENTIAL HYPERTENSION: ICD-10-CM

## 2025-07-28 DIAGNOSIS — I63.9 ISCHEMIC STROKE (MULTI): ICD-10-CM

## 2025-07-28 PROCEDURE — 1126F AMNT PAIN NOTED NONE PRSNT: CPT | Performed by: STUDENT IN AN ORGANIZED HEALTH CARE EDUCATION/TRAINING PROGRAM

## 2025-07-28 PROCEDURE — 1036F TOBACCO NON-USER: CPT | Performed by: STUDENT IN AN ORGANIZED HEALTH CARE EDUCATION/TRAINING PROGRAM

## 2025-07-28 PROCEDURE — 99214 OFFICE O/P EST MOD 30 MIN: CPT | Performed by: STUDENT IN AN ORGANIZED HEALTH CARE EDUCATION/TRAINING PROGRAM

## 2025-07-28 PROCEDURE — 3078F DIAST BP <80 MM HG: CPT | Performed by: STUDENT IN AN ORGANIZED HEALTH CARE EDUCATION/TRAINING PROGRAM

## 2025-07-28 PROCEDURE — 99204 OFFICE O/P NEW MOD 45 MIN: CPT | Performed by: STUDENT IN AN ORGANIZED HEALTH CARE EDUCATION/TRAINING PROGRAM

## 2025-07-28 PROCEDURE — 1159F MED LIST DOCD IN RCRD: CPT | Performed by: STUDENT IN AN ORGANIZED HEALTH CARE EDUCATION/TRAINING PROGRAM

## 2025-07-28 PROCEDURE — 3074F SYST BP LT 130 MM HG: CPT | Performed by: STUDENT IN AN ORGANIZED HEALTH CARE EDUCATION/TRAINING PROGRAM

## 2025-07-28 ASSESSMENT — ENCOUNTER SYMPTOMS
OCCASIONAL FEELINGS OF UNSTEADINESS: 0
LOSS OF SENSATION IN FEET: 0
DEPRESSION: 0

## 2025-07-28 ASSESSMENT — PAIN SCALES - GENERAL: PAINLEVEL_OUTOF10: 0-NO PAIN

## 2025-07-28 NOTE — PROGRESS NOTES
Neurology/Neuromuscular Consult     Nicolette Hatch, MRN: [id number], : 1945  Referring Provider: No ref. provider found  Reason for Referral: Establish Care (Blurred Vision)      Subjective   History of Present Illness  Nicolette Hatch is an 80-year-old female with atrial fibrillation, and prior amiodarone use, who presents with episodes of blurry vision in the left eye.    She has been experiencing episodes of blurry vision in her left eye for the past two years, occurring approximately once every one to two months, but recently slight increasing in frequency. Each episode lasts about two to three minutes and is described as mild blurriness that does not completely disrupt her vision. Looking up seems to alleviate the blurriness more quickly. The episodes are painless and do not occur at a specific time of day or during specific activities.    She mentions that her left eye starts to water slightly just before the blurriness begins. There is no associated pain unless she rubs the eye, which causes mild discomfort. No double vision, headaches, flashing lights, or changes in her vision aside from the blurriness. She also denies any dizziness or falls, but reports that her balance is not so great, which she attributes to aging.    Her past medical history includes atrial fibrillation, for which she is on apixaban (Eliquis) and atorvastatin. She recalls a past event diagnosed as a transient ischemic attack (TIA), where she experienced weakness in both hands, but this resolved without further issues. She also has a history of arthritis, which causes tingling in her shoulder, particularly when sitting with her arms raised.    She has seen retinal specialists and an ophthalmologist, both of whom found her eyes to be healthy. An MRI and carotid ultrasound were performed, showing no signs of stroke or significant vascular issues.  Vascular surgery also recommended seeing neurology.    She also adds that she was on  "amiodarone which was started in 2023.  She reported need to stop this in February 2025 due to lung disease, and also mentions of a film developing on the eye.      Past Medical/Surgical History Reviewed:  Problem List[1]   Medical History[2]  Surgical History[3]    Family Medical History Reviewed:  Family History[4]    Relevant Social History Reviewed:  Social History[5]    RX Allergies[6]     Medications:  Current Outpatient Medications   Medication Instructions    acetaminophen (TYLENOL) 325 mg, As needed    apixaban (ELIQUIS) 5 mg, oral, 2 times daily, AS DIRECTED    atorvastatin (LIPITOR) 40 mg, oral, Daily    cholecalciferol, vitamin D3, (VITAMIN D3 ORAL) Daily    cyanocobalamin, vitamin B-12, (VITAMIN B-12 ORAL) Daily    fexofenadine (ALLEGRA) 180 mg, oral, Daily PRN    ipratropium (Atrovent) 42 mcg (0.06 %) nasal spray 2 sprays, Each Nostril, 3 times daily    Lactobacillus acidophilus (ACIDOPHILUS ORAL) Daily    levothyroxine (SYNTHROID, LEVOXYL) 100 mcg, oral, Daily, Take on an empty stomach at the same time each day, either 30 to 60 minutes prior to breakfast    losartan (COZAAR) 25 mg, oral, Daily    MAGNESIUM ORAL Magnesium 500 MG  1 tablet with a meal Orally Once a day    sulfamethoxazole-trimethoprim (Bactrim DS) 800-160 mg tablet 1 tablet, oral, 2 times daily         Objective      /74 (BP Location: Right arm, Patient Position: Sitting, BP Cuff Size: Adult)   Pulse 78   Temp 36.1 °C (97 °F) (Temporal)   Resp 18   Ht 1.689 m (5' 6.5\")   Wt 60.3 kg (133 lb)   BMI 21.15 kg/m²    Physical Exam  Neurological Exam:  Awake alert, language normal, no dysarthria    Visual fields full  EOM full range, carol 3-4 mm, no RAPD, no nystagmus, no ptosis  Facial strength normal and symmetric   Tongue midline   Strength 5/5 throughout   Tone normal  Bulk normal   Reflexes: Symmetric and 2+ throughout.  Toes down going   Sensory: normal  No Tremors   FTN normal  Gait: normal base and stride, Romberg " normal      The following results, labs, and/or imaging were personally reviewed and/or listed for reference: See above/below    Results    MRI brain from 6/2025 shows microvascular changes, no acute infarct.  Small posterior right cerebellar encephalomalacia.    Carotid duplex 6/25/2025 with less than 50% stenosis bilaterally.  Right vertebral artery not visualized.    Carotid duplex from 2022 with less than 50% stenosis bilaterally.          Assessment/Plan      Nicolette Hatch is a 80 y.o. female, with atrial fibrillation on Eliquis, and prior amiodarone use, presenting with intermittent blurry vision in the left eye occurs approximately once a month, lasting 2-3 minutes, with associated tearing but no pain, no headache, or other neurological symptoms. Ophthalmological and neurological evaluations, including MRI and carotid ultrasound, show no evidence of anterior circulation stroke or significant carotid disease. Differential diagnosis includes non-neurological causes such as ocular abnormality, or perhaps something related to amiodarone use.  Will refer to Dr. Julien Esparza, neuro-ophthalmologist, for further evaluation.    Unlikely related to her blurry vision, her MRI did show a small, old cerebellar stroke in the posterior right cerebellar hemisphere, likely related to atrial fibrillation. No acute changes or recent strokes noted. This finding is incidental and not related to current symptoms of blurry vision. Continue current medications: apixaban (Eliquis) and atorvastatin, vascular risk factor reduction.      PLAN:  Problem List Items Addressed This Visit       Blurry vision - Primary    Relevant Orders    Referral to Neurology-neuro-ophthalmology      Chronic small right cerebellar infarct -likely secondary to A-fib or intracranial atherosclerosis  Continue with Eliquis 5 mg twice daily  Continue with high intensity statin  Vascular risk factor reduction       Vahid Hall MD  Neurology and  Neuromuscular Medicine        This medical note was created with the assistance of artificial intelligence (AI) for documentation purposes. The content has been reviewed and confirmed by the healthcare provider for accuracy and completeness. Patient consented to the use of audio recording and use of AI during their visit. Medical decision making was moderate complexity.  The patient has a neurological disorder with progression of symptoms.  I independently interpreted her neuroimaging and/or neurodiagnostics.       [1]   Patient Active Problem List  Diagnosis    Disorder of pancreatic duct (HHS-HCC)    Paroxysmal atrial fibrillation (Multi)    Paroxysmal A-fib (Multi)    Atrial paroxysmal tachycardia    Cervical spondylosis    Gastroesophageal reflux disease with esophagitis    Cystocele, midline    Generalized osteoarthritis    Hypertensive disorder    Essential hypertension    Prediabetes    History of atrial fibrillation    Hypothyroidism    Left bundle branch block (LBBB)    Macrocytosis without anemia    Mitral valve prolapse    Primary osteoarthritis of right hip    Rectocele    Female proctocele without uterine prolapse    Spondylosis of lumbosacral joint without myelopathy    Sacroiliitis    Rhinitis    Urinary tract infection with hematuria    Intervertebral disc disorder with radiculopathy of lumbosacral region    Decreased estrogen level    Edema    Edema of upper extremity    Displacement of lumbar intervertebral disc with radiculopathy    Cyanosis    Pruritic disorder    Blepharitis of both eyes    Lateral epicondylitis of right elbow    Allergic contact dermatitis due to plant    Acrocyanosis    Unspecified visual disturbance    Polyarthritis, unspecified    Spondylosis of lumbar region without myelopathy or radiculopathy    Other specified peripheral vascular diseases    Other specified soft tissue disorders    Other specified diseases of blood and blood-forming organs    Other long term (current)  drug therapy    Other infective (teno)synovitis, right hand    Other fatigue    Muscle weakness (generalized)    Low back pain, unspecified    Idiopathic gout, right hand    Hormone replacement therapy    Abnormal levels of other serum enzymes    Pneumonia due to infectious organism    Pulmonary venous congestion    Allergy status to analgesic agent    Preop cardiovascular exam    Right shoulder pain    Blurry vision    Ischemic stroke (Multi)   [2]   Past Medical History:  Diagnosis Date    Abnormality of pancreatic duct 09/06/2023    Allergic conjunctivitis 01/22/2024    Atherosclerosis of both carotid arteries 09/06/2023    Atrial fibrillation (Multi) 09/06/2023    Cellulitis 01/30/2023    Cervical spine arthritis 09/06/2023    Cervical spondylosis 09/06/2023    Chronic reflux esophagitis 09/06/2023    Contact with and (suspected) exposure to covid-19 09/06/2022    Degenerative joint disease involving multiple joints 09/06/2023    Dizziness and giddiness 12/13/2022    Epiphora due to insufficient drainage of both sides 01/28/2016    Essential hypertension 09/06/2023    Female cystocele 09/06/2023    Flexor tenosynovitis of finger 09/06/2023    Frequency of urination 09/06/2022    Hyperlipidemia 09/06/2023    Hypothyroidism 09/06/2023    Intermittent chest pain 05/21/2024    Left bundle branch block 09/06/2023    Lower urinary tract infectious disease 01/22/2024    Macrocytosis without anemia 09/06/2023    Mitral valve prolapse 09/06/2023    Prediabetes 09/06/2023    Primary osteoarthritis of right hip 09/06/2023    Unspecified abdominal pain 09/06/2022   [3]   Past Surgical History:  Procedure Laterality Date    CATARACT EXTRACTION Bilateral 2015    COLONOSCOPY  2008    COLONOSCOPY  2013    COLONOSCOPY  03/17/2018    COLPORRHAPHY  2021    A+P    ESOPHAGOGASTRODUODENOSCOPY  2008    FOOT SURGERY  2007    FOOT SURGERY Left 2019    [facility]    HAND SURGERY Left 2005    carpometacarpo arthroplasty    LUMBAR SPINE  SURGERY  1994    TOTAL ABDOMINAL HYSTERECTOMY W/ BILATERAL SALPINGOOPHORECTOMY  1980    TOTAL KNEE ARTHROPLASTY Left     August 9, 2023    TOTAL KNEE ARTHROPLASTY Right 06/05/2024    Dr. Brendan Mills    VARICOSE VEIN SURGERY  2016    Dr. Mohseni   [4]   Family History  Problem Relation Name Age of Onset    Colon cancer Mother      COPD Father Champ     Lung disease Father Champ     Other (Calcified heart valves) Sister      Colon cancer Brother     [5]   Social History  Tobacco Use    Smoking status: Never     Passive exposure: Never    Smokeless tobacco: Never   Vaping Use    Vaping status: Never Used   Substance Use Topics    Alcohol use: Never    Drug use: Never   [6]   Allergies  Allergen Reactions    Codeine Other, GI Upset, Nausea/vomiting and Drowsiness     Nausea    Flu-like sx    oversedation    Indomethacin Swelling    Nitrofurantoin Rash    Nitrofurantoin Monohyd/M-Cryst Rash

## 2025-07-28 NOTE — PATIENT INSTRUCTIONS
VISIT SUMMARY:  Today, we discussed your episodes of blurry vision in your left eye, which have been occurring more frequently. We reviewed your medical history, including atrial fibrillation. We also discussed the results of your recent MRI and carotid ultrasound, which showed no signs of a recent stroke or significant vascular issues.    YOUR PLAN:  -BLURRY VISION IN LEFT EYE: Your episodes of blurry vision in the left eye, which last 2-3 minutes and occur about once a month, are likely due to non-neurological cause. We will refer you to Dr. Julien Esparza, a neuro-ophthalmologist, for further evaluation.    -SMALL CEREBELLAR STROKE: An MRI showed a small, old stroke in the posterior right cerebellar hemisphere, likely related to your atrial fibrillation. This finding is incidental and not related to your current blurry vision. Continue taking your current medications: apixaban (Eliquis) and atorvastatin.    INSTRUCTIONS:  Please follow up with Dr. Julien Esparza, the neuro-ophthalmologist, for further evaluation of your blurry vision. Continue taking your current medications as prescribed. If you experience any new or worsening symptoms, please contact our office immediately.      Please do the following:     Call [phone] for General Referral Scheduling.     If you are having trouble scheduling your appointments or testing, please contact us.       Make a follow-up appointment with me as needed       You can reach us at our office phone: [phone], option 2 or 3.     Vahid Hall MD  Neurology and Neuromuscular Medicine   [facility]   [facility]  Primary office: [facility], [address]  [address] [place] [address]

## 2025-07-30 NOTE — PROGRESS NOTES
"Assessment and Plan    06/25/2025 MRI brain without contrast, which I personally reviewed, shows similar findings and by report, also shows \"small linear focus of encephalomalacia along the posterior right cerebellar hemisphere\".  12/13/2022 MRI brain without contrast, which I personally reviewed, shows moderate volume loss, bihemispheric white matter changes, and a small remote right cerebellar infarct.      06/25/2025 Vascular US Carotid Artery Duplex Bilateral, by report, shows  \" Utilizing the peak systolic velocities, the estimated degree of stenosis within the internal carotid arteries is less than 50% ,bilaterally. Right vertebral artery not visualized.\"  02/16/2024 transthoracic echocardiogram, by report, shows  \"1. Left ventricular systolic function is mildly decreased with a 45-50% estimated ejection fraction.  2. Spectral Doppler shows an impaired relaxation pattern of left ventricular diastolic filling.  3. There is moderate aortic valve cusp calcification.  4. Mild aortic valve regurgitation.  5. There is global hypokinesis of the left ventricle with minor regional variations.\"  04/26/2021 Myocardial Perfusion Scan, by report, shows  \"No evidence of stress induced reversible myocardial ischemia.   Question of fixed defect involving the inferoseptal segment versus findings related to the left bundle branch block.   Left ventricular ejection fraction measures 75%.\"  04/13/2021 transthoracic echocardiogram, by report, shows  \"Left ventricle function is normal.  Mild aortic valve cusp sclerosis.  A trace of aortic regurgitation.  A trace of mitral regurgitation.  Slight E to A reversal, probably due to an increase of left ventricle  end diastolic pressure.  Trival to mild tricuspid regurgitation.\"  02/28/2020 transthoracic echocardiogram, by report, shows  \"There is normal left ventricular systolic function.  Estimated ejection fraction is 55-59%.  Mild aortic regurgitation.   Mitral valve is structurally " "normal.  There is no evidence of prolapse of mitral valve leaflet(s).   Mild mitral regurgitation.  Mild tricuspid regurgitation.\"  02/28/2020 Vascular US Carotid Artery Duplex Bilateral, by report, shows  \"Plaque formation right carotid bifurcation with less than 50% ICA stenosis. Plaque formation left carotid bifurcation with less than 50% ICA stenosis.\"  Prior cardiac imaging.     Lab Results   Component Value Date/Time    SEDRATE 31 (H) 09/13/2024 0909    SEDRATE 21 09/05/2024 1702    SEDRATE 30 05/08/2024 1021    SEDRATE 6 04/06/2023 1212    SEDRATE 31 (H) 01/29/2023 1250    CRP 0.70 09/13/2024 0909    CRP <0.30 09/05/2024 1703    CRP 2.00 05/08/2024 1021    CRP 0.3 04/06/2023 1212    CRP 1.6 01/29/2023 1250     Nutritional studies  Lab Results   Component Value Date/Time    OZODPGZA20 1,002 (H) 08/03/2019 0816    FOLATE 20.0 (H) 08/03/2019 0816     01/28/2025 TSH LOW 0.20, fT4 1.7  07/25/2024 TSH 1.29  01/24/2024 TSH HIGH 5.67  07/24/2023 TSH 3.24  01/10/2023 TSH HIGH 4.26  09/27/2022 TSH HIGH 9.81  09/06/2022 TSH 3.90  03/15/2022 TSH 3.79  09/14/2021 TSH 2.52  07/16/2021 TSH 1.85  03/29/2021 TSH HIGH 4.80  02/22/2021 TSH 2.05  08/24/2020 TSH 2.20  02/10/2020 TSH 2.97  08/03/2019 TSH 2.71     This 80 year-old woman with a history of prediabetes, HTN, HLD, paroxysmal atrial fibrillation, hypothyroidism, pancreatic duct dilatation, sacroiliitis, gout, OA, pseudophakia presents for evaluation of left eye blurry vision.     Eye examination shows normal post-cataract extraction findings. Dry eye is most likely. She does not describe any event ischemic character such as graying or darkening of vision. The events otherwise seem too short for a neuro-ophthalmological etiology.    Plan    Trial of artificial tears during episodes.    No neuro-ophthalmology follow up at this time. (Dilated 7/31/2025)  "

## 2025-07-31 ENCOUNTER — OFFICE VISIT (OUTPATIENT)
Dept: URGENT CARE | Age: 80
End: 2025-07-31
Payer: MEDICARE

## 2025-07-31 ENCOUNTER — OFFICE VISIT (OUTPATIENT)
Dept: OPHTHALMOLOGY | Facility: CLINIC | Age: 80
End: 2025-07-31
Payer: MEDICARE

## 2025-07-31 ENCOUNTER — ANCILLARY PROCEDURE (OUTPATIENT)
Dept: URGENT CARE | Age: 80
End: 2025-07-31
Payer: MEDICARE

## 2025-07-31 VITALS
SYSTOLIC BLOOD PRESSURE: 159 MMHG | OXYGEN SATURATION: 100 % | DIASTOLIC BLOOD PRESSURE: 77 MMHG | TEMPERATURE: 97.8 F | RESPIRATION RATE: 18 BRPM | HEART RATE: 78 BPM | WEIGHT: 133 LBS | BODY MASS INDEX: 21.15 KG/M2

## 2025-07-31 DIAGNOSIS — H04.123 DRY EYES: ICD-10-CM

## 2025-07-31 DIAGNOSIS — M25.521 RIGHT ELBOW PAIN: ICD-10-CM

## 2025-07-31 DIAGNOSIS — M77.8 RIGHT ELBOW TENDONITIS: Primary | ICD-10-CM

## 2025-07-31 DIAGNOSIS — H53.8 BLURRY VISION: Primary | ICD-10-CM

## 2025-07-31 PROCEDURE — 73080 X-RAY EXAM OF ELBOW: CPT | Mod: RIGHT SIDE | Performed by: PHYSICIAN ASSISTANT

## 2025-07-31 PROCEDURE — 99204 OFFICE O/P NEW MOD 45 MIN: CPT | Performed by: PSYCHIATRY & NEUROLOGY

## 2025-07-31 RX ORDER — LIDOCAINE AND MENTHOL 40; 40 MG/1; MG/1
1 PATCH TOPICAL EVERY 12 HOURS PRN
Qty: 10 PATCH | Refills: 0 | Status: SHIPPED | OUTPATIENT
Start: 2025-07-31

## 2025-07-31 RX ORDER — METHYLPREDNISOLONE 4 MG/1
TABLET ORAL
Qty: 21 TABLET | Refills: 0 | Status: SHIPPED | OUTPATIENT
Start: 2025-07-31 | End: 2025-08-06

## 2025-07-31 ASSESSMENT — ENCOUNTER SYMPTOMS
ALLERGIC/IMMUNOLOGIC NEGATIVE: 0
MUSCULOSKELETAL NEGATIVE: 0
PSYCHIATRIC NEGATIVE: 0
HEMATOLOGIC/LYMPHATIC NEGATIVE: 0
CARDIOVASCULAR NEGATIVE: 0
RESPIRATORY NEGATIVE: 0
CONSTITUTIONAL NEGATIVE: 0
NEUROLOGICAL NEGATIVE: 0
EYES NEGATIVE: 1
ENDOCRINE NEGATIVE: 0
GASTROINTESTINAL NEGATIVE: 0

## 2025-07-31 ASSESSMENT — TONOMETRY
IOP_METHOD: TONOPEN
OD_IOP_MMHG: 15
OS_IOP_MMHG: 14

## 2025-07-31 ASSESSMENT — CONF VISUAL FIELD
OD_INFERIOR_TEMPORAL_RESTRICTION: 0
OD_SUPERIOR_NASAL_RESTRICTION: 0
OS_NORMAL: 1
OD_SUPERIOR_TEMPORAL_RESTRICTION: 0
METHOD: COUNTING FINGERS
OS_INFERIOR_NASAL_RESTRICTION: 0
OD_INFERIOR_NASAL_RESTRICTION: 0
OS_SUPERIOR_TEMPORAL_RESTRICTION: 0
OS_INFERIOR_TEMPORAL_RESTRICTION: 0
OS_SUPERIOR_NASAL_RESTRICTION: 0
OD_NORMAL: 1

## 2025-07-31 ASSESSMENT — VISUAL ACUITY
OS_CC+: -1
OD_CC+: -1
OD_CC: 20/20
OS_CC: 20/20
METHOD: SNELLEN - LINEAR

## 2025-07-31 ASSESSMENT — SLIT LAMP EXAM - LIDS
COMMENTS: NORMAL
COMMENTS: NORMAL

## 2025-07-31 ASSESSMENT — EXTERNAL EXAM - LEFT EYE: OS_EXAM: NORMAL

## 2025-07-31 ASSESSMENT — EXTERNAL EXAM - RIGHT EYE: OD_EXAM: NORMAL

## 2025-07-31 ASSESSMENT — CUP TO DISC RATIO: OD_RATIO: 0.2

## 2025-07-31 NOTE — LETTER
July 31, 2025     Vahid Hall MD  [id number] [address]  [address] [address]    Patient: Nicolette Hatch   YOB: 1945   Date of Visit: 7/31/2025     Dear Dr. Vahid Hall MD:    I am writing to share my findings regarding our shared patient Nicolette Hatch from her visit with me on 7/31/2025.    HPI    This 80 year-old woman with a history of prediabetes, HTN, HLD, paroxysmal atrial fibrillation, hypothyroidism, pancreatic duct dilatation, sacroiliitis, gout, OA, pseudophakia presents for evaluation of left eye blurry vision.    She has intermittent left eye blurring for a few years. Episodes are 1-2 per month, sometimes none. During episodes, she will have blurring and watering of the left eye. Looking up seems to help. Episodes last 1-2 minutes.    She has a more frequent odd feeling on the left side that she describes as swollen. She feels the left eye while the right eye she does not have any particular abnormal feeling or awareness.    She denies any accompanying other symptoms or factors in these episodes.     She was referred by neurologist Dr. Vahid Hall, last seen 7/28/2025 with report of episodes of painless blurry vision with tearing in the left eye for the past 2 years.      She was seen by vascular surgery NP Pearl Salgado 7/3/2025 with mild carotid stenosis and recommended follow-up as needed.     She was seen by retina specialist Dr. Julien Treviño 6/4/2025 with mild macular degeneration and no abnormalities on OCT-A. He recommended follow up with neurology and repeat cardiovascular work up for stroke if not performed within the past 6 months.     She was seen by ophthalmologist Dr. Rupert Martell 1/22/2016 and diagnosed with blepharitis and epiphora due to insufficient drainage of both sides.    Last edited by Julien Esparza MD PhD on 7/31/2025  8:12 AM.        Diagnoses    Diagnoses and all orders for this visit:  Blurry vision (Primary)  Dry eyes  Other orders  -      "Referral to Neurology    Assessment and Plan    06/25/2025 MRI brain without contrast, which I personally reviewed, shows similar findings and by report, also shows \"small linear focus of encephalomalacia along the posterior right cerebellar hemisphere\".  12/13/2022 MRI brain without contrast, which I personally reviewed, shows moderate volume loss, bihemispheric white matter changes, and a small remote right cerebellar infarct.      06/25/2025 Vascular US Carotid Artery Duplex Bilateral, by report, shows  \" Utilizing the peak systolic velocities, the estimated degree of stenosis within the internal carotid arteries is less than 50% ,bilaterally. Right vertebral artery not visualized.\"  02/16/2024 transthoracic echocardiogram, by report, shows  \"1. Left ventricular systolic function is mildly decreased with a 45-50% estimated ejection fraction.  2. Spectral Doppler shows an impaired relaxation pattern of left ventricular diastolic filling.  3. There is moderate aortic valve cusp calcification.  4. Mild aortic valve regurgitation.  5. There is global hypokinesis of the left ventricle with minor regional variations.\"  04/26/2021 Myocardial Perfusion Scan, by report, shows  \"No evidence of stress induced reversible myocardial ischemia.   Question of fixed defect involving the inferoseptal segment versus findings related to the left bundle branch block.   Left ventricular ejection fraction measures 75%.\"  04/13/2021 transthoracic echocardiogram, by report, shows  \"Left ventricle function is normal.  Mild aortic valve cusp sclerosis.  A trace of aortic regurgitation.  A trace of mitral regurgitation.  Slight E to A reversal, probably due to an increase of left ventricle  end diastolic pressure.  Trival to mild tricuspid regurgitation.\"  02/28/2020 transthoracic echocardiogram, by report, shows  \"There is normal left ventricular systolic function.  Estimated ejection fraction is 55-59%.  Mild aortic regurgitation. " "  Mitral valve is structurally normal.  There is no evidence of prolapse of mitral valve leaflet(s).   Mild mitral regurgitation.  Mild tricuspid regurgitation.\"  02/28/2020 Vascular US Carotid Artery Duplex Bilateral, by report, shows  \"Plaque formation right carotid bifurcation with less than 50% ICA stenosis. Plaque formation left carotid bifurcation with less than 50% ICA stenosis.\"  Prior cardiac imaging.     Lab Results   Component Value Date/Time    SEDRATE 31 (H) 09/13/2024 0909    SEDRATE 21 09/05/2024 1702    SEDRATE 30 05/08/2024 1021    SEDRATE 6 04/06/2023 1212    SEDRATE 31 (H) 01/29/2023 1250    CRP 0.70 09/13/2024 0909    CRP <0.30 09/05/2024 1703    CRP 2.00 05/08/2024 1021    CRP 0.3 04/06/2023 1212    CRP 1.6 01/29/2023 1250     Nutritional studies  Lab Results   Component Value Date/Time    JQTMNAMM55 1,002 (H) 08/03/2019 0816    FOLATE 20.0 (H) 08/03/2019 0816     01/28/2025 TSH LOW 0.20, fT4 1.7  07/25/2024 TSH 1.29  01/24/2024 TSH HIGH 5.67  07/24/2023 TSH 3.24  01/10/2023 TSH HIGH 4.26  09/27/2022 TSH HIGH 9.81  09/06/2022 TSH 3.90  03/15/2022 TSH 3.79  09/14/2021 TSH 2.52  07/16/2021 TSH 1.85  03/29/2021 TSH HIGH 4.80  02/22/2021 TSH 2.05  08/24/2020 TSH 2.20  02/10/2020 TSH 2.97  08/03/2019 TSH 2.71     This 80 year-old woman with a history of prediabetes, HTN, HLD, paroxysmal atrial fibrillation, hypothyroidism, pancreatic duct dilatation, sacroiliitis, gout, OA, pseudophakia presents for evaluation of left eye blurry vision.     Eye examination shows normal post-cataract extraction findings. Dry eye is most likely. She does not describe any event ischemic character such as graying or darkening of vision. The events otherwise seem too short for a neuro-ophthalmological etiology.    Plan    Trial of artificial tears during episodes.    No neuro-ophthalmology follow up at this time. (Dilated 7/31/2025)      Below you will find my full examination. I appreciate the opportunity to see Nicolette" Holden today and to share in her care with you. Please contact me if you have questions for me regarding this visit or if I can be of assistance to another of your patients with neuro-ophthalmological problems.    Sincerely,    Julien Esparza MD PhD    CC:   No Recipients      Base Eye Exam       Visual Acuity (Snellen - Linear)         Right Left    Dist cc 20/20 -1 20/20 -1              Tonometry (Tonopen, 8:03 AM)         Right Left    Pressure 15 14              Pupils         Pupils Dark Light Shape React APD    Right PERRL, No APD 4 2 Round Brisk None    Left PERRL, No APD 4 2 Round Brisk None              Visual Fields (Counting fingers)         Left Right     Full Full              Extraocular Movement         Right Left     Full, Ortho Full, Ortho   No internuclear ophthalmoplegia with saccades.             Neuro/Psych       Oriented x3: Yes              Dilation       Both eyes: 1% Mydriacyl & 2.5% Agustín  @ 8:37 AM                  Additional Tests       Color         Right Left    Ishihara 9/12 9/12                  Slit Lamp and Fundus Exam       External Exam         Right Left    External Normal Normal              Slit Lamp Exam         Right Left    Lids/Lashes Normal Normal    Conjunctiva/Sclera White and quiet White and quiet    Cornea Clear Clear    Anterior Chamber Deep and quiet Deep and quiet    Iris Round and reactive Round and reactive    Lens Posterior chamber intraocular lens Posterior chamber intraocular lens    Anterior Vitreous Posterior vitreous detachment Posterior vitreous detachment              Fundus Exam         Right Left    Disc Normal Normal    C/D Ratio 0.2     Macula Normal Normal    Vessels Normal Normal    Periphery Normal Normal

## 2025-07-31 NOTE — PATIENT INSTRUCTIONS
Please do not hesitate to go to the emergency department if you develop any significant redness, swelling or difficulty moving your elbow, fevers or chills.  You are prescribed a Medrol Dosepak to help with swelling.  You were also prescribed pain patches for pain.  You can also prescribe your Voltaren gel that you have at home.

## 2025-07-31 NOTE — PROGRESS NOTES
Subjective   Patient ID: Nicolette Hatch is a 80 y.o. female who presents for Elbow Pain (Pt c/o rt shoulder pain and bruising, denies injury. Had cortisone shot on shoulder on Monday on rt arm).  History of Present Illness  Nicolette Hatch is an 80 year old female who presents with severe elbow pain and bruising. Severe elbow pain and bruising began after a cortisone shot three days ago, with pain starting either that afternoon or the next day. The pain is very severe, preventing her from lifting food to her mouth. There is no history of falls or injuries. She can bend the elbow, but it is painful, and the area feels bruised. No fever is present. She is on Eliquis, limiting her pain management options to Tylenol. She has Voltaren gel available at home.    ROS is negative unless otherwise stated in HPI.       Objective     /77 (BP Location: Left arm, Patient Position: Sitting, BP Cuff Size: Large adult)   Pulse 78   Temp 36.6 °C (97.8 °F) (Oral)   Resp 18   Wt 60.3 kg (133 lb)   SpO2 100%   BMI 21.15 kg/m²        VS: As documented in the triage note and EMR flowsheet from this visit was reviewed  General: Well appearing. No acute distress.   Eyes:  Extraocular movements grossly intact. No scleral icterus.   Head: Atraumatic. Normocephalic.     Neck: No meningismus. No gross masses. Full movement through range of motion  CV: Regular rhythm. No murmurs, rubs, gallops appreciated.   Resp: Clear to auscultation bilaterally. No respiratory distress.    GI: Nontender. Soft. No masses. No rebound, rigidity or guarding.   MSK: Symmetric muscle bulk. No gross step offs or deformities. Ecchymosis, mild edema and tenderness to the lateral aspect of the right elbow   Neuro: CN II-VII intact. A&O x3. Speech fluent. Alert. Moving all extremities. Ambulates with normal gait  Psych: Appropriate mood and affect for situation      Point of Care Test & Imaging Results from this visit  No results found for this visit on  07/31/25.   Imaging  XR elbow right 3+ views  Result Date: 7/31/2025  No gross effusion. No definite fracture or dislocation.   Mild arthritic changes as described.   MACRO: None   Signed by: Robin Lundberg 7/31/2025 12:35 PM Dictation workstation:   [id number]      Cardiology, Vascular, and Other Imaging  No other imaging results found for the past 2 days      Diagnostic study results (if any) were reviewed by Trisha Wilson PA-C.    Assessment/Plan   Allergies, medications, history, and pertinent labs/EKGs/Imaging reviewed by Trisha Wilson PA-C.     Assessment & Plan  Patient is a 80-year-old female who presents with right elbow for past 3 days.  She notes of bruising pain of the right elbow which seemingly occurred after having a cortisone injection of the right shoulder 3 days ago.  She is unsure of any trauma or injury.  States that she has been doing physical therapy which has increased the amount of activity she is doing with the right upper extremity.  Vitals are stable.  On examination she has very mild edema to the lateral aspect of the right elbow with overlying ecchymosis and tenderness to palpation.  She has full range of motion.  Findings are most consistent with some form of contusion versus tendinitis versus overuse injury.  X-ray shows no acute osseous abnormality.  Patient was wrapped in an Ace bandage and placed on a Medrol Dosepak and lidocaine patches.  Advised to follow-up if symptoms are persistent.  Advised to not hesitate to go to the emergency department if she develops any redness, limited range of motion, worsening edema,.    Orders and Diagnoses  Diagnoses and all orders for this visit:  Right elbow tendonitis  -     methylPREDNISolone (Medrol Dospak) 4 mg tablets; Follow schedule on package instructions  -     lidocaine-menthol 4-4 % adhesive patch,medicated; Apply 1 patch topically every 12 hours if needed (for pain).  Right elbow pain  -     XR elbow right 3+  views        Disposition:  Home      Trisha Wilson PA-C     This medical note was created with the assistance of artificial intelligence (AI) for documentation purposes. The content has been reviewed and confirmed by the healthcare provider for accuracy and completeness. Patient consented to the use of audio recording and use of AI during their visit.

## 2025-08-01 ENCOUNTER — TELEPHONE (OUTPATIENT)
Dept: URGENT CARE | Age: 80
End: 2025-08-01

## 2025-08-06 ENCOUNTER — APPOINTMENT (OUTPATIENT)
Dept: CARDIOLOGY | Facility: CLINIC | Age: 80
End: 2025-08-06
Payer: MEDICARE

## 2025-08-08 LAB
25(OH)D3+25(OH)D2 SERPL-MCNC: 31 NG/ML (ref 30–100)
ALBUMIN SERPL-MCNC: 4 G/DL (ref 3.6–5.1)
ALP SERPL-CCNC: 111 U/L (ref 37–153)
ALT SERPL-CCNC: 20 U/L (ref 6–29)
ANION GAP SERPL CALCULATED.4IONS-SCNC: 8 MMOL/L (CALC) (ref 7–17)
AST SERPL-CCNC: 19 U/L (ref 10–35)
BASOPHILS # BLD AUTO: 79 CELLS/UL (ref 0–200)
BASOPHILS NFR BLD AUTO: 0.8 %
BILIRUB SERPL-MCNC: 0.5 MG/DL (ref 0.2–1.2)
BUN SERPL-MCNC: 25 MG/DL (ref 7–25)
CALCIUM SERPL-MCNC: 9 MG/DL (ref 8.6–10.4)
CHLORIDE SERPL-SCNC: 106 MMOL/L (ref 98–110)
CHOLEST SERPL-MCNC: 154 MG/DL
CHOLEST/HDLC SERPL: 2.5 (CALC)
CO2 SERPL-SCNC: 27 MMOL/L (ref 20–32)
CREAT SERPL-MCNC: 0.76 MG/DL (ref 0.6–0.95)
EGFRCR SERPLBLD CKD-EPI 2021: 78 ML/MIN/1.73M2
EOSINOPHIL # BLD AUTO: 257 CELLS/UL (ref 15–500)
EOSINOPHIL NFR BLD AUTO: 2.6 %
ERYTHROCYTE [DISTWIDTH] IN BLOOD BY AUTOMATED COUNT: 12.4 % (ref 11–15)
EST. AVERAGE GLUCOSE BLD GHB EST-MCNC: 114 MG/DL
EST. AVERAGE GLUCOSE BLD GHB EST-SCNC: 6.3 MMOL/L
GLUCOSE SERPL-MCNC: 85 MG/DL (ref 65–99)
HBA1C MFR BLD: 5.6 %
HCT VFR BLD AUTO: 45.8 % (ref 35–45)
HDLC SERPL-MCNC: 62 MG/DL
HGB BLD-MCNC: 15.1 G/DL (ref 11.7–15.5)
LDLC SERPL CALC-MCNC: 76 MG/DL (CALC)
LYMPHOCYTES # BLD AUTO: 3386 CELLS/UL (ref 850–3900)
LYMPHOCYTES NFR BLD AUTO: 34.2 %
MCH RBC QN AUTO: 32.8 PG (ref 27–33)
MCHC RBC AUTO-ENTMCNC: 33 G/DL (ref 32–36)
MCV RBC AUTO: 99.6 FL (ref 80–100)
MONOCYTES # BLD AUTO: 1059 CELLS/UL (ref 200–950)
MONOCYTES NFR BLD AUTO: 10.7 %
NEUTROPHILS # BLD AUTO: 5118 CELLS/UL (ref 1500–7800)
NEUTROPHILS NFR BLD AUTO: 51.7 %
NONHDLC SERPL-MCNC: 92 MG/DL (CALC)
PLATELET # BLD AUTO: 285 THOUSAND/UL (ref 140–400)
PMV BLD REES-ECKER: 9.7 FL (ref 7.5–12.5)
POTASSIUM SERPL-SCNC: 4.4 MMOL/L (ref 3.5–5.3)
PROT SERPL-MCNC: 6.6 G/DL (ref 6.1–8.1)
RBC # BLD AUTO: 4.6 MILLION/UL (ref 3.8–5.1)
SODIUM SERPL-SCNC: 141 MMOL/L (ref 135–146)
TRIGL SERPL-MCNC: 84 MG/DL
TSH SERPL-ACNC: 0.46 MIU/L (ref 0.4–4.5)
WBC # BLD AUTO: 9.9 THOUSAND/UL (ref 3.8–10.8)

## 2025-08-12 ENCOUNTER — APPOINTMENT (OUTPATIENT)
Dept: RADIOLOGY | Facility: CLINIC | Age: 80
End: 2025-08-12
Payer: MEDICARE

## 2025-08-15 ENCOUNTER — OFFICE VISIT (OUTPATIENT)
Dept: PRIMARY CARE | Facility: CLINIC | Age: 80
End: 2025-08-15
Payer: MEDICARE

## 2025-08-15 VITALS
HEIGHT: 67 IN | WEIGHT: 138 LBS | DIASTOLIC BLOOD PRESSURE: 74 MMHG | BODY MASS INDEX: 21.66 KG/M2 | SYSTOLIC BLOOD PRESSURE: 112 MMHG | TEMPERATURE: 96.1 F

## 2025-08-15 DIAGNOSIS — R73.03 PREDIABETES: ICD-10-CM

## 2025-08-15 DIAGNOSIS — D75.89 MACROCYTOSIS WITHOUT ANEMIA: ICD-10-CM

## 2025-08-15 DIAGNOSIS — M81.0 AGE-RELATED OSTEOPOROSIS WITHOUT CURRENT PATHOLOGICAL FRACTURE: ICD-10-CM

## 2025-08-15 DIAGNOSIS — E55.9 VITAMIN D DEFICIENCY: ICD-10-CM

## 2025-08-15 DIAGNOSIS — I10 ESSENTIAL HYPERTENSION: Primary | ICD-10-CM

## 2025-08-15 DIAGNOSIS — R35.0 INCREASED URINARY FREQUENCY: ICD-10-CM

## 2025-08-15 DIAGNOSIS — N32.81 OAB (OVERACTIVE BLADDER): ICD-10-CM

## 2025-08-15 DIAGNOSIS — R73.9 HYPERGLYCEMIA: ICD-10-CM

## 2025-08-15 DIAGNOSIS — E03.9 ACQUIRED HYPOTHYROIDISM: ICD-10-CM

## 2025-08-15 DIAGNOSIS — E78.2 MIXED HYPERLIPIDEMIA: ICD-10-CM

## 2025-08-15 DIAGNOSIS — I48.0 PAROXYSMAL A-FIB (MULTI): ICD-10-CM

## 2025-08-15 PROCEDURE — 1126F AMNT PAIN NOTED NONE PRSNT: CPT | Performed by: INTERNAL MEDICINE

## 2025-08-15 PROCEDURE — 99214 OFFICE O/P EST MOD 30 MIN: CPT | Performed by: INTERNAL MEDICINE

## 2025-08-15 PROCEDURE — G2211 COMPLEX E/M VISIT ADD ON: HCPCS | Performed by: INTERNAL MEDICINE

## 2025-08-15 PROCEDURE — 1159F MED LIST DOCD IN RCRD: CPT | Performed by: INTERNAL MEDICINE

## 2025-08-15 PROCEDURE — 3074F SYST BP LT 130 MM HG: CPT | Performed by: INTERNAL MEDICINE

## 2025-08-15 PROCEDURE — 3078F DIAST BP <80 MM HG: CPT | Performed by: INTERNAL MEDICINE

## 2025-08-15 RX ORDER — TOLTERODINE 4 MG/1
4 CAPSULE, EXTENDED RELEASE ORAL DAILY
Qty: 30 CAPSULE | Refills: 5 | Status: SHIPPED | OUTPATIENT
Start: 2025-08-15 | End: 2025-08-18 | Stop reason: SDUPTHER

## 2025-08-15 RX ORDER — ACETAMINOPHEN 500 MG
50 TABLET ORAL DAILY
COMMUNITY

## 2025-08-15 ASSESSMENT — LIFESTYLE VARIABLES
HOW OFTEN DURING THE LAST YEAR HAVE YOU FOUND THAT YOU WERE NOT ABLE TO STOP DRINKING ONCE YOU HAD STARTED: NEVER
HAS A RELATIVE, FRIEND, DOCTOR, OR ANOTHER HEALTH PROFESSIONAL EXPRESSED CONCERN ABOUT YOUR DRINKING OR SUGGESTED YOU CUT DOWN: NO
HOW OFTEN DURING THE LAST YEAR HAVE YOU FOUND THAT YOU WERE NOT ABLE TO STOP DRINKING ONCE YOU HAD STARTED: NEVER
HOW OFTEN DO YOU HAVE SIX OR MORE DRINKS ON ONE OCCASION: NEVER
HOW OFTEN DURING THE LAST YEAR HAVE YOU NEEDED AN ALCOHOLIC DRINK FIRST THING IN THE MORNING TO GET YOURSELF GOING AFTER A NIGHT OF HEAVY DRINKING: NEVER
HOW OFTEN DURING THE LAST YEAR HAVE YOU BEEN UNABLE TO REMEMBER WHAT HAPPENED THE NIGHT BEFORE BECAUSE YOU HAD BEEN DRINKING: NEVER
HAS A RELATIVE, FRIEND, DOCTOR, OR ANOTHER HEALTH PROFESSIONAL EXPRESSED CONCERN ABOUT YOUR DRINKING OR SUGGESTED YOU CUT DOWN: NO
HOW OFTEN DURING THE LAST YEAR HAVE YOU FAILED TO DO WHAT WAS NORMALLY EXPECTED FROM YOU BECAUSE OF DRINKING: NEVER
HAVE YOU OR SOMEONE ELSE BEEN INJURED AS A RESULT OF YOUR DRINKING: NO
HOW MANY STANDARD DRINKS CONTAINING ALCOHOL DO YOU HAVE ON A TYPICAL DAY: PATIENT DOES NOT DRINK
HOW OFTEN DURING THE LAST YEAR HAVE YOU HAD A FEELING OF GUILT OR REMORSE AFTER DRINKING: NEVER
AUDIT TOTAL SCORE: 0
AUDIT-C TOTAL SCORE: 0
HOW MANY STANDARD DRINKS CONTAINING ALCOHOL DO YOU HAVE ON A TYPICAL DAY: PATIENT DOES NOT DRINK
SKIP TO QUESTIONS 9-10: 1
HAVE YOU OR SOMEONE ELSE BEEN INJURED AS A RESULT OF YOUR DRINKING: NO
HOW OFTEN DURING THE LAST YEAR HAVE YOU HAD A FEELING OF GUILT OR REMORSE AFTER DRINKING: NEVER
AUDIT-C TOTAL SCORE: 0
HOW OFTEN DO YOU HAVE A DRINK CONTAINING ALCOHOL: NEVER
HOW OFTEN DURING THE LAST YEAR HAVE YOU NEEDED AN ALCOHOLIC DRINK FIRST THING IN THE MORNING TO GET YOURSELF GOING AFTER A NIGHT OF HEAVY DRINKING: NEVER
SKIP TO QUESTIONS 9-10: 1
HOW OFTEN DURING THE LAST YEAR HAVE YOU FAILED TO DO WHAT WAS NORMALLY EXPECTED FROM YOU BECAUSE OF DRINKING: NEVER
HOW OFTEN DO YOU HAVE A DRINK CONTAINING ALCOHOL: NEVER
HOW OFTEN DO YOU HAVE SIX OR MORE DRINKS ON ONE OCCASION: NEVER
AUDIT TOTAL SCORE: 0
HOW OFTEN DURING THE LAST YEAR HAVE YOU BEEN UNABLE TO REMEMBER WHAT HAPPENED THE NIGHT BEFORE BECAUSE YOU HAD BEEN DRINKING: NEVER

## 2025-08-15 ASSESSMENT — ENCOUNTER SYMPTOMS
DEPRESSION: 0
LOSS OF SENSATION IN FEET: 0
RESPIRATORY NEGATIVE: 1
PALPITATIONS: 1
OCCASIONAL FEELINGS OF UNSTEADINESS: 0

## 2025-08-15 ASSESSMENT — PAIN SCALES - GENERAL: PAINLEVEL_OUTOF10: 0-NO PAIN

## 2025-08-17 LAB
APPEARANCE UR: CLEAR
BACTERIA #/AREA URNS HPF: ABNORMAL /HPF
BACTERIA UR CULT: ABNORMAL
BACTERIA UR CULT: ABNORMAL
BILIRUB UR QL STRIP: NEGATIVE
COLOR UR: YELLOW
GLUCOSE UR QL STRIP: NEGATIVE
HGB UR QL STRIP: ABNORMAL
HYALINE CASTS #/AREA URNS LPF: ABNORMAL /LPF
KETONES UR QL STRIP: NEGATIVE
LEUKOCYTE ESTERASE UR QL STRIP: ABNORMAL
NITRITE UR QL STRIP: NEGATIVE
PH UR STRIP: 6 [PH] (ref 5–8)
PROT UR QL STRIP: NEGATIVE
RBC #/AREA URNS HPF: ABNORMAL /HPF
SERVICE CMNT-IMP: ABNORMAL
SP GR UR STRIP: 1.01 (ref 1–1.03)
SQUAMOUS #/AREA URNS HPF: ABNORMAL /HPF
WBC #/AREA URNS HPF: ABNORMAL /HPF

## 2025-08-18 ENCOUNTER — RESULTS FOLLOW-UP (OUTPATIENT)
Dept: PRIMARY CARE | Facility: CLINIC | Age: 80
End: 2025-08-18
Payer: MEDICARE

## 2025-08-18 DIAGNOSIS — N39.0 URINARY TRACT INFECTION WITHOUT HEMATURIA, SITE UNSPECIFIED: ICD-10-CM

## 2025-08-18 DIAGNOSIS — N32.81 OAB (OVERACTIVE BLADDER): ICD-10-CM

## 2025-08-18 RX ORDER — CEPHALEXIN 500 MG/1
500 CAPSULE ORAL 3 TIMES DAILY
Qty: 15 CAPSULE | Refills: 0 | Status: SHIPPED | OUTPATIENT
Start: 2025-08-18 | End: 2025-08-23

## 2025-08-18 RX ORDER — TOLTERODINE 4 MG/1
4 CAPSULE, EXTENDED RELEASE ORAL DAILY
Qty: 30 CAPSULE | Refills: 5 | Status: SHIPPED | OUTPATIENT
Start: 2025-08-18 | End: 2026-02-14

## 2025-08-22 ENCOUNTER — HOSPITAL ENCOUNTER (OUTPATIENT)
Dept: RADIOLOGY | Facility: CLINIC | Age: 80
Discharge: HOME | End: 2025-08-22
Payer: MEDICARE

## 2025-08-22 DIAGNOSIS — D49.0 IPMN (INTRADUCTAL PAPILLARY MUCINOUS NEOPLASM): ICD-10-CM

## 2025-08-22 DIAGNOSIS — Q45.3 ABNORMALITY OF PANCREATIC DUCT: ICD-10-CM

## 2025-08-22 PROCEDURE — A9575 INJ GADOTERATE MEGLUMI 0.1ML: HCPCS | Mod: JW | Performed by: PHYSICIAN ASSISTANT

## 2025-08-22 PROCEDURE — 2550000001 HC RX 255 CONTRASTS: Mod: JW | Performed by: PHYSICIAN ASSISTANT

## 2025-08-22 PROCEDURE — 74183 MRI ABD W/O CNTR FLWD CNTR: CPT

## 2025-08-22 RX ORDER — GADOTERATE MEGLUMINE 376.9 MG/ML
12 INJECTION INTRAVENOUS
Status: COMPLETED | OUTPATIENT
Start: 2025-08-22 | End: 2025-08-22

## 2025-08-22 RX ADMIN — GADOTERATE MEGLUMINE 12 ML: 376.9 INJECTION INTRAVENOUS at 10:08

## 2025-08-29 ENCOUNTER — TELEMEDICINE (OUTPATIENT)
Dept: SURGICAL ONCOLOGY | Facility: CLINIC | Age: 80
End: 2025-08-29
Payer: MEDICARE

## 2025-08-29 DIAGNOSIS — K86.9 DISORDER OF PANCREATIC DUCT (HHS-HCC): Primary | ICD-10-CM

## 2025-08-29 DIAGNOSIS — K86.2 PANCREAS CYST (HHS-HCC): ICD-10-CM

## 2025-08-29 PROCEDURE — 99214 OFFICE O/P EST MOD 30 MIN: CPT | Performed by: PHYSICIAN ASSISTANT

## 2025-12-08 ENCOUNTER — APPOINTMENT (OUTPATIENT)
Age: 80
End: 2025-12-08
Payer: MEDICARE